# Patient Record
Sex: FEMALE
[De-identification: names, ages, dates, MRNs, and addresses within clinical notes are randomized per-mention and may not be internally consistent; named-entity substitution may affect disease eponyms.]

---

## 2021-03-02 PROBLEM — Z00.00 ENCOUNTER FOR PREVENTIVE HEALTH EXAMINATION: Status: ACTIVE | Noted: 2021-03-02

## 2021-03-08 ENCOUNTER — APPOINTMENT (OUTPATIENT)
Dept: SURGERY | Facility: CLINIC | Age: 76
End: 2021-03-08
Payer: MEDICARE

## 2021-03-08 VITALS
HEIGHT: 62 IN | DIASTOLIC BLOOD PRESSURE: 72 MMHG | OXYGEN SATURATION: 99 % | TEMPERATURE: 98.3 F | SYSTOLIC BLOOD PRESSURE: 123 MMHG | WEIGHT: 130 LBS | HEART RATE: 67 BPM | BODY MASS INDEX: 23.92 KG/M2

## 2021-03-08 DIAGNOSIS — Z81.8 FAMILY HISTORY OF OTHER MENTAL AND BEHAVIORAL DISORDERS: ICD-10-CM

## 2021-03-08 DIAGNOSIS — Z80.0 FAMILY HISTORY OF MALIGNANT NEOPLASM OF DIGESTIVE ORGANS: ICD-10-CM

## 2021-03-08 DIAGNOSIS — E78.00 PURE HYPERCHOLESTEROLEMIA, UNSPECIFIED: ICD-10-CM

## 2021-03-08 DIAGNOSIS — Z83.3 FAMILY HISTORY OF DIABETES MELLITUS: ICD-10-CM

## 2021-03-08 DIAGNOSIS — K80.20 CALCULUS OF GALLBLADDER W/OUT CHOLECYSTITIS W/OUT OBSTRUCTION: ICD-10-CM

## 2021-03-08 DIAGNOSIS — Z78.9 OTHER SPECIFIED HEALTH STATUS: ICD-10-CM

## 2021-03-08 DIAGNOSIS — E11.9 TYPE 2 DIABETES MELLITUS W/OUT COMPLICATIONS: ICD-10-CM

## 2021-03-08 DIAGNOSIS — R09.89 OTHER SPECIFIED SYMPTOMS AND SIGNS INVOLVING THE CIRCULATORY AND RESPIRATORY SYSTEMS: ICD-10-CM

## 2021-03-08 DIAGNOSIS — Z80.9 FAMILY HISTORY OF MALIGNANT NEOPLASM, UNSPECIFIED: ICD-10-CM

## 2021-03-08 PROCEDURE — 99203 OFFICE O/P NEW LOW 30 MIN: CPT

## 2021-03-08 PROCEDURE — 99072 ADDL SUPL MATRL&STAF TM PHE: CPT

## 2021-03-12 ENCOUNTER — OUTPATIENT (OUTPATIENT)
Dept: OUTPATIENT SERVICES | Facility: HOSPITAL | Age: 76
LOS: 1 days | End: 2021-03-12
Payer: COMMERCIAL

## 2021-03-12 VITALS
TEMPERATURE: 98 F | SYSTOLIC BLOOD PRESSURE: 137 MMHG | OXYGEN SATURATION: 99 % | HEART RATE: 69 BPM | DIASTOLIC BLOOD PRESSURE: 74 MMHG | RESPIRATION RATE: 18 BRPM | WEIGHT: 130.07 LBS | HEIGHT: 62 IN

## 2021-03-12 DIAGNOSIS — K80.20 CALCULUS OF GALLBLADDER WITHOUT CHOLECYSTITIS WITHOUT OBSTRUCTION: ICD-10-CM

## 2021-03-12 DIAGNOSIS — E11.40 TYPE 2 DIABETES MELLITUS WITH DIABETIC NEUROPATHY, UNSPECIFIED: ICD-10-CM

## 2021-03-12 DIAGNOSIS — K81.9 CHOLECYSTITIS, UNSPECIFIED: ICD-10-CM

## 2021-03-12 DIAGNOSIS — I10 ESSENTIAL (PRIMARY) HYPERTENSION: ICD-10-CM

## 2021-03-12 DIAGNOSIS — E78.5 HYPERLIPIDEMIA, UNSPECIFIED: ICD-10-CM

## 2021-03-12 DIAGNOSIS — Z98.891 HISTORY OF UTERINE SCAR FROM PREVIOUS SURGERY: Chronic | ICD-10-CM

## 2021-03-12 DIAGNOSIS — M81.0 AGE-RELATED OSTEOPOROSIS WITHOUT CURRENT PATHOLOGICAL FRACTURE: ICD-10-CM

## 2021-03-12 DIAGNOSIS — E11.9 TYPE 2 DIABETES MELLITUS WITHOUT COMPLICATIONS: ICD-10-CM

## 2021-03-12 DIAGNOSIS — Z98.890 OTHER SPECIFIED POSTPROCEDURAL STATES: Chronic | ICD-10-CM

## 2021-03-12 DIAGNOSIS — J30.2 OTHER SEASONAL ALLERGIC RHINITIS: ICD-10-CM

## 2021-03-12 DIAGNOSIS — Z01.818 ENCOUNTER FOR OTHER PREPROCEDURAL EXAMINATION: ICD-10-CM

## 2021-03-12 LAB — BLD GP AB SCN SERPL QL: SIGNIFICANT CHANGE UP

## 2021-03-12 PROCEDURE — G0463: CPT

## 2021-03-12 NOTE — H&P PST ADULT - HISTORY OF PRESENT ILLNESS
76 yr old female with PMH of  presents with c/o intermittent abdominal pain due to gallstones. Pt reports feeling nauseous before meals and worsening of pain after ingestion of fatty meals. Pt for laparoscopic cholecystectomy with intraoperative cholangiogram, possible open on 03/24/2021. 76 yr old female with PMH of Diabetes, diabetic neuropathy, seasonal allergies, HTN, HLD, osteoporosis presents with c/o gallstones that were incidentally discovered.. Pt reports that she fell and she had a spleen laceration. Follow-up CT of the abdomen revealed multiple gallstones. Denies any discomfort, nausea, vomiting. Pt is scheduled for laparoscopic cholecystectomy with intraoperative cholangiogram, possible open on 03/24/2021.

## 2021-03-12 NOTE — H&P PST ADULT - NEGATIVE GASTROINTESTINAL SYMPTOMS
no nausea/no vomiting/no diarrhea/no constipation/no change in bowel habits/no flatulence/no abdominal pain/no melena/no jaundice/no hiccoughs

## 2021-03-12 NOTE — H&P PST ADULT - BP NONINVASIVE SYSTOLIC (MM HG)
137

## 2021-03-12 NOTE — H&P PST ADULT - ASSESSMENT
76 yr old female with PMH of Diabetes, diabetic neuropathy, seasonal allergies, HTN, Hyperlipidemia, osteoporosis presents with cholelithiasis. Pt is scheduled for laparoscopic cholecystectomy with intraoperative cholangiogram, possible open on 03/24/2021.

## 2021-03-12 NOTE — H&P PST ADULT - NSICDXFAMILYHX_GEN_ALL_CORE_FT
FAMILY HISTORY:  Family history of depression, mother  Family history of diabetes mellitus in mother  Family history of oral cancer, sister  Family history of stomach cancer, sister  FH: dementia, mother  FH: HTN (hypertension), mother

## 2021-03-12 NOTE — H&P PST ADULT - I HAVE PERSONALLY SEEN AND EXAMINED THE PATIENT. THERE HAVE NOT BEEN ANY CHANGES IN THE PATIENT'S HISTORY OR EXAM UNLESS COMMENTED BELOW
Anemia    Ectopic pregnancy  2, both ruptured  Fibromyalgia    GI bleed    Herniated disc, cervical  c6-c7  Joint effusion    Lumbar disc herniation  L4-L5, BULGING DISC AT L5-S1  Lupus    Ovarian cyst rupture    Polyarthralgia
Statement Selected

## 2021-03-12 NOTE — H&P PST ADULT - NSICDXPROBLEM_GEN_ALL_CORE_FT
PROBLEM DIAGNOSES  Problem: CL (cholelithiasis)  Assessment and Plan: Laparoscopic cholecystectomy with intraoperative cholangiogram, possible open on 03/24/2021. Preoperative instructions discussed with pt via Zimbabwean speaking pacific  Regina ID# [id number] per the patient's request even though she was able to maintain a perfect conversation in English before and after the  was called. Pt answered questions at times before  translated for her. Written [place]an Instructions given to pt. Instructed pt that she will need someone to escort her home after surgery, that no one will be allowed to come to hospital with her, to notify security when she arrives in the lobby of the hospital that she is here for surgery, not to eat or drink anything after midnight the night before the surgery, to avoid NSAIDS such as Ibuprofen, motrin, aleve, advil, naproxen before surgery, to take Tylenol if needed for pain, to report if she has been exposed to any one with any contagious diseases including Covid-19 or if she is exhibiting any symptoms of COVID-19, to keep appointment for COVID-19  test 3 days before surgery. Instructed about use of Chlorhexidine 4% soap before surgery. Verbalized understanding of instructions given.     Problem: DM (diabetes mellitus)  Assessment and Plan: Instructed to continue antidiabetic meds and to hold on day of surgery. Perioperative glucose monitoring and coverage as needed. Follow-up with PCP for diabetic management     Problem: Diabetic neuropathy  Assessment and Plan: Follow-up with PCP for management.     Problem: HTN (hypertension)  Assessment and Plan: Instructed to continue antihypertensive meds and take with sips of water on day of surgery.  Follow-up with PCP for management.     Problem: HLD (hyperlipidemia)  Assessment and Plan: Instructed pt to continue Atorvastatin and to follow-up with PCP for lipid management     Problem: Seasonal allergies  Assessment and Plan: Instructed to continue medications as needed and to follow-up with PCP for allergy management.     Problem: Osteoporosis  Assessment and Plan: Instructed to continue Ibandronate and to follow-up with provider for management.

## 2021-03-12 NOTE — H&P PST ADULT - NSICDXPASTMEDICALHX_GEN_ALL_CORE_FT
PAST MEDICAL HISTORY:  CL (cholelithiasis)     DM (diabetes mellitus)     HLD (hyperlipidemia)     HTN (hypertension)     Osteoporosis      PAST MEDICAL HISTORY:  CL (cholelithiasis)     Diabetic neuropathy     DM (diabetes mellitus)     HLD (hyperlipidemia)     HTN (hypertension)     Osteoporosis

## 2021-03-12 NOTE — H&P PST ADULT - MUSCULOSKELETAL COMMENTS
c/o pain in feet; h/o frequent [id number] negative as per patient Osteoporosis, c/o pain in feet; h/o frequent [id number] negative as per patient

## 2021-03-12 NOTE — H&P PST ADULT - NEGATIVE NEUROLOGICAL SYMPTOMS
no transient paralysis/no weakness/no paresthesias/no generalized seizures/no focal seizures/no syncope/no tremors/no vertigo

## 2021-03-15 NOTE — PHYSICAL EXAM
[No Rash or Lesion] : No rash or lesion [Alert] : alert [Oriented to Person] : oriented to person [Oriented to Place] : oriented to place [Oriented to Time] : oriented to time [Calm] : calm [de-identified] : The patient is alert, well-groomed, well developed and cheerful.  [de-identified] : Head is normocephalic. Conjunctiva pink, anicteric. Nasal mucosa pink, septum midline. Oral mucosa pink. Tongue midline, pharynx without exudates. \par \par   [de-identified] : Neck supple. Trachea midline. Thyroid isthmus barely palpable, lobes not felt.\par   [de-identified] : Breath sounds equal and bilateral, no wheezing no rales or rhonchi  [de-identified] :  good S1, S2, no m/r/g bilateral  [de-identified] : Normoactive bowel sounds, soft and nontender, no hepatosplenomegaly or masses noted, Patient has healed scars from previous surgeries [de-identified] : WNL

## 2021-03-15 NOTE — PLAN
[FreeTextEntry1] : Ms. KNIGHT  was told significance of findings, options, risks and benefits were explained.  Informed consent for laparoscopic/possible open  cholecystectomy  and potential risks, benefits and alternatives (surgical options were discussed including non-surgical options or the option of no surgery) to the planned surgery were discussed in depth.  All surgical options were discussed including non-surgical treatments.  She wishes to proceed with surgery.  We will plan for surgery on at the next available date, pending any required insurance pre-certification or pre-approval. She agrees to obtain any necessary pre-operative evaluations and testing prior to surgery. Patient instructed to maintain a fat-free diet, and to seek immediate medical attention with any acute change or worsening of symptoms, including but not limited to abdominal pain, fever, chills, nausea, vomiting, or yellowing of the skin. \par Patient advised to seek immediate medical attention with any acute change in symptoms or with the development of any new or worsening symptoms.  Patient's questions and concerns addressed to patient's satisfaction, and patient verbalized an understanding of the information discussed.

## 2021-03-15 NOTE — CONSULT LETTER
[Dear  ___] : Dear  [unfilled], [Consult Letter:] : I had the pleasure of evaluating your patient, [unfilled]. [Please see my note below.] : Please see my note below. [Consult Closing:] : Thank you very much for allowing me to participate in the care of this patient.  If you have any questions, please do not hesitate to contact me. [Sincerely,] : Sincerely, [FreeTextEntry3] : Eddie Hummel MD, FACS

## 2021-03-15 NOTE — HISTORY OF PRESENT ILLNESS
[de-identified] : STANISLAV KNIGHT is a 75 year old female who present in the office for initial consultation who was referred by Dr. Regan Donaldson with the chief complaint of having abdominal pain. Patient was admitted to [facility] with diabetic coma and was discharged to rehab. Patient repots having a fall in [place] and had a spleen laceration. Patient underwent a CT scan on 12/09/2020 which revealed multiple gallstones. Patient reports no nausea or vomiting and no history of jaundice, acholia or choluria. Appetite is good and weight is stable. Patient has family history of gallstone of a daughter that was successfully treated. Patient denies any other discomfort. \par \par  [de-identified] : \par

## 2021-03-21 ENCOUNTER — APPOINTMENT (OUTPATIENT)
Dept: DISASTER EMERGENCY | Facility: CLINIC | Age: 76
End: 2021-03-21

## 2021-03-22 LAB — SARS-COV-2 N GENE NPH QL NAA+PROBE: NOT DETECTED

## 2021-03-24 ENCOUNTER — APPOINTMENT (OUTPATIENT)
Dept: SURGERY | Facility: HOSPITAL | Age: 76
End: 2021-03-24

## 2021-04-03 DIAGNOSIS — Z01.818 ENCOUNTER FOR OTHER PREPROCEDURAL EXAMINATION: ICD-10-CM

## 2021-04-04 ENCOUNTER — APPOINTMENT (OUTPATIENT)
Dept: DISASTER EMERGENCY | Facility: CLINIC | Age: 76
End: 2021-04-04

## 2021-04-05 LAB — SARS-COV-2 N GENE NPH QL NAA+PROBE: NOT DETECTED

## 2021-04-06 ENCOUNTER — TRANSCRIPTION ENCOUNTER (OUTPATIENT)
Age: 76
End: 2021-04-06

## 2021-04-07 ENCOUNTER — APPOINTMENT (OUTPATIENT)
Dept: SURGERY | Facility: HOSPITAL | Age: 76
End: 2021-04-07

## 2021-04-07 ENCOUNTER — OUTPATIENT (OUTPATIENT)
Dept: OUTPATIENT SERVICES | Facility: HOSPITAL | Age: 76
LOS: 1 days | End: 2021-04-07
Payer: COMMERCIAL

## 2021-04-07 ENCOUNTER — RESULT REVIEW (OUTPATIENT)
Age: 76
End: 2021-04-07

## 2021-04-07 VITALS — OXYGEN SATURATION: 98 % | HEART RATE: 63 BPM | RESPIRATION RATE: 14 BRPM

## 2021-04-07 VITALS
SYSTOLIC BLOOD PRESSURE: 181 MMHG | HEIGHT: 62 IN | TEMPERATURE: 98 F | HEART RATE: 71 BPM | WEIGHT: 132.06 LBS | RESPIRATION RATE: 17 BRPM | OXYGEN SATURATION: 98 % | DIASTOLIC BLOOD PRESSURE: 83 MMHG

## 2021-04-07 DIAGNOSIS — Z98.891 HISTORY OF UTERINE SCAR FROM PREVIOUS SURGERY: Chronic | ICD-10-CM

## 2021-04-07 DIAGNOSIS — Z98.890 OTHER SPECIFIED POSTPROCEDURAL STATES: Chronic | ICD-10-CM

## 2021-04-07 DIAGNOSIS — K80.20 CALCULUS OF GALLBLADDER WITHOUT CHOLECYSTITIS WITHOUT OBSTRUCTION: ICD-10-CM

## 2021-04-07 LAB
BLD GP AB SCN SERPL QL: SIGNIFICANT CHANGE UP
GLUCOSE BLDC GLUCOMTR-MCNC: 129 MG/DL — HIGH (ref 70–99)
GLUCOSE BLDC GLUCOMTR-MCNC: 204 MG/DL — HIGH (ref 70–99)

## 2021-04-07 PROCEDURE — 88304 TISSUE EXAM BY PATHOLOGIST: CPT

## 2021-04-07 PROCEDURE — 47563 LAPARO CHOLECYSTECTOMY/GRAPH: CPT

## 2021-04-07 PROCEDURE — 86900 BLOOD TYPING SEROLOGIC ABO: CPT

## 2021-04-07 PROCEDURE — C9399: CPT

## 2021-04-07 PROCEDURE — 82962 GLUCOSE BLOOD TEST: CPT

## 2021-04-07 PROCEDURE — 36415 COLL VENOUS BLD VENIPUNCTURE: CPT

## 2021-04-07 PROCEDURE — 88304 TISSUE EXAM BY PATHOLOGIST: CPT | Mod: 26

## 2021-04-07 PROCEDURE — 86901 BLOOD TYPING SEROLOGIC RH(D): CPT

## 2021-04-07 PROCEDURE — 47562 LAPAROSCOPIC CHOLECYSTECTOMY: CPT

## 2021-04-07 PROCEDURE — 47563 LAPARO CHOLECYSTECTOMY/GRAPH: CPT | Mod: AS

## 2021-04-07 PROCEDURE — 76000 FLUOROSCOPY <1 HR PHYS/QHP: CPT

## 2021-04-07 PROCEDURE — 86850 RBC ANTIBODY SCREEN: CPT

## 2021-04-07 RX ORDER — HYDROMORPHONE HYDROCHLORIDE 2 MG/ML
1 INJECTION INTRAMUSCULAR; INTRAVENOUS; SUBCUTANEOUS
Refills: 0 | Status: DISCONTINUED | OUTPATIENT
Start: 2021-04-07 | End: 2021-04-07

## 2021-04-07 RX ORDER — HYDROMORPHONE HYDROCHLORIDE 2 MG/ML
0.5 INJECTION INTRAMUSCULAR; INTRAVENOUS; SUBCUTANEOUS
Refills: 0 | Status: DISCONTINUED | OUTPATIENT
Start: 2021-04-07 | End: 2021-04-07

## 2021-04-07 RX ORDER — SODIUM CHLORIDE 9 MG/ML
3 INJECTION INTRAMUSCULAR; INTRAVENOUS; SUBCUTANEOUS EVERY 8 HOURS
Refills: 0 | Status: DISCONTINUED | OUTPATIENT
Start: 2021-04-07 | End: 2021-04-07

## 2021-04-07 RX ORDER — OXYCODONE HYDROCHLORIDE 5 MG/1
1 TABLET ORAL
Qty: 8 | Refills: 0
Start: 2021-04-07

## 2021-04-07 NOTE — ASU DISCHARGE PLAN (ADULT/PEDIATRIC) - CARE PROVIDER_API CALL
Eddie Hummel)  Surgery  [address] [facility], [place] [address]  Phone: [phone]  Fax: [phone]  Follow Up Time: 2 weeks

## 2021-04-07 NOTE — ASU DISCHARGE PLAN (ADULT/PEDIATRIC) - ASU DC SPECIAL INSTRUCTIONSFT
Please follow-up with your surgeon in 2 weeks. Drink plenty of fluids and rest as needed. Call for any fever over 101, nausea, vomiting, severe pain, no passing of gas or bowel movement.     DIET   You may resume your regular diet as normal.     SURGICAL SITES   Remove outer dressing and keep white steri-strips in place allowing them to fall off on their own. You may shower 48 hours post-operatively but do not bathe or soak in the water for 1-2 weeks; pat dry. If you notice any signs of surgical site infection (ie. redness, swelling, pain, pus drainage), please seek medical care immediately.     ACTIVITY Do not lift anything heavier than 10 pounds for 2 weeks and avoid strenuous activity for 4-6 weeks.     PAIN CONTROL   You may take Motrin 600mg (with food) or Tylenol 650mg as needed for mild pain. Stagger one medication 3 hours after the other for maximum pain control. Maximum daily dose of Tylenol should not exceed 4grams/day.  You may take your prescribed oxycodone for severe breakthrough pain not that is not relieved by Tylenol/Motrin. Do not drive or make important decisions while taking this medication and do not take more than 4 pills in 24 hours.

## 2021-04-09 LAB — SURGICAL PATHOLOGY STUDY: SIGNIFICANT CHANGE UP

## 2023-03-21 NOTE — ASU PREOP CHECKLIST - SIDE RAILS UP
Chief complaint:   Chief Complaint   Patient presents with   • Follow-up     Pt here for 1 week f/u on cyst       Vitals:  Visit Vitals  /58 (BP Location: RUE - Right upper extremity, Patient Position: Sitting, Cuff Size: Regular)   Pulse 76   Ht 5' 11\" (1.803 m)   Wt 105.7 kg (233 lb)   SpO2 99%   BMI 32.50 kg/m²       HISTORY OF PRESENT ILLNESS     Patient is a 60-year-old female who presents for follow-up. She has sebaceous cyst on the left upper back that was drained on last office visit.  She was also prescribed clindamycin for 7 days for the 2nd round treatment.  She stated that the cyst is healing but continues to have mild pain.  She denies fever, chills, pus drainage.         Past medical history, medications, allergies and social history: Reviewed and updated in Epic.       Other significant problems:  Patient Active Problem List    Diagnosis Date Noted   • Chondromalacia, knee, left 06/14/2021     Priority: Low   • Chronic fatigue 06/01/2021     Priority: Low   • Mild obstructive sleep apnea-hypopnea syndrome 06/01/2021     Priority: Low   • BMI 33.0-33.9,adult 03/10/2021     Priority: Low   • Controlled substance agreement signed 04/02/2018     Priority: Low     Formatting of this note might be different from the original.  Dr. Thai Sanchez     • DJD (degenerative joint disease), lumbar 01/04/2017     Priority: Low   • Myofascial pain syndrome 01/04/2017     Priority: Low   • High risk medication use 10/04/2016     Priority: Low   • Inflammatory polyarthritis (CMD) 12/28/2015     Priority: Low   • Gastritis 07/02/2015     Priority: Low   • Insomnia 06/06/2012     Priority: Low   • Depression 06/05/2012     Priority: Low   • Memory loss or impairment 06/05/2012     Priority: Low   • Raynaud's phenomenon 06/05/2012     Priority: Low       PAST MEDICAL, FAMILY AND SOCIAL HISTORY     Medications:  Current Outpatient Medications   Medication Sig Dispense Refill   • gabapentin (NEURONTIN) 600 MG  tablet Take 1 tablet by mouth in the morning and 1 tablet at noon and 1 tablet in the evening. 90 tablet 2   • hydroxychloroquine (PLAQUENIL) 200 MG tablet Take 1 tablet by mouth twice daily 180 tablet 1   • meloxicam (MOBIC) 7.5 MG tablet Take 1 tablet by mouth twice daily as needed for pain 180 tablet 1   • pantoprazole (PROTONIX) 40 MG tablet Take 1 tablet by mouth once daily 90 tablet 2   • fluoxetine (PROzac) 40 MG capsule Take 1 capsule by mouth once daily 90 capsule 2   • tiZANidine (ZANAFLEX) 4 MG tablet Take 1 tablet by mouth 2 times daily as needed (muscle spasms). 30 tablet 0   • VITAMIN D, CHOLECALCIFEROL, PO        No current facility-administered medications for this visit.       Allergies:  ALLERGIES:   Allergen Reactions   • Cephalexin VOMITING   • Adhesive   (Environmental) RASH   • Codeine Other (See Comments)     Bad thoughts mentally    • Duloxetine Nausea & Vomiting   • Fexofenadine HEADACHES   • Levofloxacin SWELLING   • Montelukast HEADACHES   • Pregabalin NAUSEA   • Rofecoxib NAUSEA   • Sulfa Antibiotics Other (See Comments)     Both parents are allergic       Past Medical  History/Surgeries:  Past Medical History:   Diagnosis Date   • Elevated blood pressure reading 6/1/2021   • Fracture of right proximal fibula 9/25/2015   • Gastroesophageal reflux disease    • Pain in joint 6/5/2012   • Pain in knee 6/6/2012   • PONV (postoperative nausea and vomiting)    • Right leg injury, subsequent encounter 8/14/2015   • Sleep apnea        Past Surgical History:   Procedure Laterality Date   • Carpal tunnel release Bilateral    • Hysterectomy     • Lipoma resection      back   • Tonsillectomy         Family History:  Family History   Problem Relation Age of Onset   • Congestive Heart Failure Mother    • Congestive Heart Failure Father    • Stroke Father        Social History:  Social History     Tobacco Use   • Smoking status: Former   • Smokeless tobacco: Never   Substance Use Topics   • Alcohol use:  Yes     Alcohol/week: 1.0 standard drink     Types: 1 Glasses of wine per week     Comment: 2 x yr       REVIEW OF SYSTEMS     Constitutional: Negative for fever and chills or diaphoresis.   Skin: cyst improving.   Respiratory: Negative cough, wheezing or shortness of breath.    Cardiovascular: Negative for chest pain or palpitations.    Gastrointestinal: Negative for nausea, vomiting, diarrhea or abdominal pain.   Neurologic:  Negative for headaches, weakness or numbness   Psychiatric: Negative for change in affect or change in mentation.     PHYSICAL EXAM     Vital Signs:   Visit Vitals  /58 (BP Location: RUE - Right upper extremity, Patient Position: Sitting, Cuff Size: Regular)   Pulse 76   Ht 5' 11\" (1.803 m)   Wt 105.7 kg (233 lb)   SpO2 99%   BMI 32.50 kg/m²     Constitutional: Well developed/Well nourished. In NAD (no acute distress). Appears stated age.   Cardiovascular: Normal heart rate. Normal rhythm. No murmurs.  Respiratory: Normal breath sounds. No respiratory distress. No wheezing.   Neurologic: Alert and oriented x3. Normal motor function. Normal sensory function. No focal deficits noted.    Skin: healing cyst on left upper back s /p I&D.   Musculoskeletal: Range of motion within normal limits. No tenderness, swelling/edema.  Psychiatric: Mood and affect are appropriate.      ASSESSMENT/PLAN     Sebaceous cyst    The cyst has gone down in swelling and there is no active pus drainage. Patient to monitored closely and will call next week for updates and should follow up in clinic or UC/ED for any worsening symptoms.          The patient indicated understanding of the diagnosis and agreed with the plan of care.   Treatment options discussed with patient and explained in detail. The risks, benefits and potential side effects of possible medications were reviewed. Alternatives were discussed. Medication instructions and consequences of not taking the medications were discussed. Patient's  understanding was assessed and patient agreed with the plan. Monitoring parameters and expected course outlined. Patient to call or come in if symptoms fail to respond as outlined or worsen in any way.    Janeth Jarrell MD  Family Medicine   03/21/23                         done

## 2024-01-30 ENCOUNTER — INPATIENT (INPATIENT)
Facility: HOSPITAL | Age: 79
LOS: 14 days | Discharge: HOME HEALTH | End: 2024-02-14
Attending: INTERNAL MEDICINE | Admitting: INTERNAL MEDICINE
Payer: MEDICARE

## 2024-01-30 VITALS
TEMPERATURE: 98 F | OXYGEN SATURATION: 100 % | HEART RATE: 102 BPM | RESPIRATION RATE: 16 BRPM | DIASTOLIC BLOOD PRESSURE: 80 MMHG | SYSTOLIC BLOOD PRESSURE: 128 MMHG

## 2024-01-30 DIAGNOSIS — R79.89 OTHER SPECIFIED ABNORMAL FINDINGS OF BLOOD CHEMISTRY: ICD-10-CM

## 2024-01-30 DIAGNOSIS — N39.0 URINARY TRACT INFECTION, SITE NOT SPECIFIED: ICD-10-CM
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
## 2024-01-30 DIAGNOSIS — M31.6 OTHER GIANT CELL ARTERITIS: ICD-10-CM

## 2024-01-30 DIAGNOSIS — Z98.891 HISTORY OF UTERINE SCAR FROM PREVIOUS SURGERY: Chronic | ICD-10-CM

## 2024-01-30 DIAGNOSIS — I10 ESSENTIAL (PRIMARY) HYPERTENSION: ICD-10-CM

## 2024-01-30 DIAGNOSIS — E78.5 HYPERLIPIDEMIA, UNSPECIFIED: ICD-10-CM

## 2024-01-30 DIAGNOSIS — Z29.9 ENCOUNTER FOR PROPHYLACTIC MEASURES, UNSPECIFIED: ICD-10-CM

## 2024-01-30 DIAGNOSIS — N30.00 ACUTE CYSTITIS WITHOUT HEMATURIA: ICD-10-CM

## 2024-01-30 DIAGNOSIS — Z86.69 PERSONAL HISTORY OF OTHER DISEASES OF THE NERVOUS SYSTEM AND SENSE ORGANS: ICD-10-CM

## 2024-01-30 DIAGNOSIS — E87.0 HYPEROSMOLALITY AND HYPERNATREMIA: ICD-10-CM

## 2024-01-30 DIAGNOSIS — D64.9 ANEMIA, UNSPECIFIED: ICD-10-CM

## 2024-01-30 DIAGNOSIS — Z98.890 OTHER SPECIFIED POSTPROCEDURAL STATES: Chronic | ICD-10-CM

## 2024-01-30 DIAGNOSIS — E87.6 HYPOKALEMIA: ICD-10-CM

## 2024-01-30 DIAGNOSIS — R41.82 ALTERED MENTAL STATUS, UNSPECIFIED: ICD-10-CM

## 2024-01-30 DIAGNOSIS — E11.9 TYPE 2 DIABETES MELLITUS WITHOUT COMPLICATIONS: ICD-10-CM

## 2024-01-30 DIAGNOSIS — N82.0 VESICOVAGINAL FISTULA: ICD-10-CM

## 2024-01-30 PROBLEM — E11.40 TYPE 2 DIABETES MELLITUS WITH DIABETIC NEUROPATHY, UNSPECIFIED: Chronic | Status: ACTIVE | Noted: 2021-03-12

## 2024-01-30 PROBLEM — M81.0 AGE-RELATED OSTEOPOROSIS WITHOUT CURRENT PATHOLOGICAL FRACTURE: Chronic | Status: ACTIVE | Noted: 2021-03-12

## 2024-01-30 PROBLEM — K80.20 CALCULUS OF GALLBLADDER WITHOUT CHOLECYSTITIS WITHOUT OBSTRUCTION: Chronic | Status: ACTIVE | Noted: 2021-03-12

## 2024-01-30 LAB
ALBUMIN SERPL ELPH-MCNC: 3.6 G/DL — SIGNIFICANT CHANGE UP (ref 3.3–5)
ALP SERPL-CCNC: 55 U/L — SIGNIFICANT CHANGE UP (ref 40–120)
ALT FLD-CCNC: <5 U/L — SIGNIFICANT CHANGE UP (ref 4–33)
ANION GAP SERPL CALC-SCNC: 14 MMOL/L — SIGNIFICANT CHANGE UP (ref 7–14)
ANION GAP SERPL CALC-SCNC: 21 MMOL/L — HIGH (ref 7–14)
ANISOCYTOSIS BLD QL: SLIGHT — SIGNIFICANT CHANGE UP
APPEARANCE UR: ABNORMAL
APTT BLD: 24.8 SEC — SIGNIFICANT CHANGE UP (ref 24.5–35.6)
AST SERPL-CCNC: 20 U/L — SIGNIFICANT CHANGE UP (ref 4–32)
BACTERIA # UR AUTO: ABNORMAL /HPF
BASE EXCESS BLDV CALC-SCNC: -3.9 MMOL/L — LOW (ref -2–3)
BASOPHILS # BLD AUTO: 0 K/UL — SIGNIFICANT CHANGE UP (ref 0–0.2)
BASOPHILS NFR BLD AUTO: 0 % — SIGNIFICANT CHANGE UP (ref 0–2)
BILIRUB SERPL-MCNC: 0.5 MG/DL — SIGNIFICANT CHANGE UP (ref 0.2–1.2)
BILIRUB UR-MCNC: ABNORMAL
BLOOD GAS VENOUS COMPREHENSIVE RESULT: SIGNIFICANT CHANGE UP
BUN SERPL-MCNC: 25 MG/DL — HIGH (ref 7–23)
BUN SERPL-MCNC: 29 MG/DL — HIGH (ref 7–23)
BURR CELLS BLD QL SMEAR: PRESENT — SIGNIFICANT CHANGE UP
CALCIUM SERPL-MCNC: 8.7 MG/DL — SIGNIFICANT CHANGE UP (ref 8.4–10.5)
CALCIUM SERPL-MCNC: 9.2 MG/DL — SIGNIFICANT CHANGE UP (ref 8.4–10.5)
CAST: 0 /LPF — SIGNIFICANT CHANGE UP (ref 0–4)
CHLORIDE BLDV-SCNC: 109 MMOL/L — HIGH (ref 96–108)
CHLORIDE SERPL-SCNC: 110 MMOL/L — HIGH (ref 98–107)
CHLORIDE SERPL-SCNC: 113 MMOL/L — HIGH (ref 98–107)
CO2 BLDV-SCNC: 21.9 MMOL/L — LOW (ref 22–26)
CO2 SERPL-SCNC: 18 MMOL/L — LOW (ref 22–31)
CO2 SERPL-SCNC: 23 MMOL/L — SIGNIFICANT CHANGE UP (ref 22–31)
COLOR SPEC: ABNORMAL
CREAT SERPL-MCNC: 0.41 MG/DL — LOW (ref 0.5–1.3)
CREAT SERPL-MCNC: 0.44 MG/DL — LOW (ref 0.5–1.3)
DIFF PNL FLD: ABNORMAL
EGFR: 98 ML/MIN/1.73M2 — SIGNIFICANT CHANGE UP
EGFR: 99 ML/MIN/1.73M2 — SIGNIFICANT CHANGE UP
EOSINOPHIL # BLD AUTO: 0 K/UL — SIGNIFICANT CHANGE UP (ref 0–0.5)
EOSINOPHIL NFR BLD AUTO: 0 % — SIGNIFICANT CHANGE UP (ref 0–6)
GAS PNL BLDV: 146 MMOL/L — HIGH (ref 136–145)
GIANT PLATELETS BLD QL SMEAR: PRESENT — SIGNIFICANT CHANGE UP
GLUCOSE BLDV-MCNC: 247 MG/DL — HIGH (ref 70–99)
GLUCOSE SERPL-MCNC: 190 MG/DL — HIGH (ref 70–99)
GLUCOSE SERPL-MCNC: 249 MG/DL — HIGH (ref 70–99)
GLUCOSE UR QL: 250 MG/DL
HCO3 BLDV-SCNC: 21 MMOL/L — LOW (ref 22–29)
HCT VFR BLD CALC: 35.1 % — SIGNIFICANT CHANGE UP (ref 34.5–45)
HCT VFR BLDA CALC: 37 % — SIGNIFICANT CHANGE UP (ref 34.5–46.5)
HGB BLD CALC-MCNC: 12.3 G/DL — SIGNIFICANT CHANGE UP (ref 11.7–16.1)
HGB BLD-MCNC: 11.7 G/DL — SIGNIFICANT CHANGE UP (ref 11.5–15.5)
IANC: 12.65 K/UL — HIGH (ref 1.8–7.4)
INR BLD: 0.92 RATIO — SIGNIFICANT CHANGE UP (ref 0.85–1.18)
KETONES UR-MCNC: 40 MG/DL
LACTATE BLDV-MCNC: 1.8 MMOL/L — SIGNIFICANT CHANGE UP (ref 0.5–2)
LEUKOCYTE ESTERASE UR-ACNC: ABNORMAL
LYMPHOCYTES # BLD AUTO: 1.36 K/UL — SIGNIFICANT CHANGE UP (ref 1–3.3)
LYMPHOCYTES # BLD AUTO: 8.8 % — LOW (ref 13–44)
MANUAL SMEAR VERIFICATION: SIGNIFICANT CHANGE UP
MCHC RBC-ENTMCNC: 27.9 PG — SIGNIFICANT CHANGE UP (ref 27–34)
MCHC RBC-ENTMCNC: 33.3 GM/DL — SIGNIFICANT CHANGE UP (ref 32–36)
MCV RBC AUTO: 83.8 FL — SIGNIFICANT CHANGE UP (ref 80–100)
MONOCYTES # BLD AUTO: 0.42 K/UL — SIGNIFICANT CHANGE UP (ref 0–0.9)
MONOCYTES NFR BLD AUTO: 2.7 % — SIGNIFICANT CHANGE UP (ref 2–14)
NEUTROPHILS # BLD AUTO: 13.13 K/UL — HIGH (ref 1.8–7.4)
NEUTROPHILS NFR BLD AUTO: 85 % — HIGH (ref 43–77)
NITRITE UR-MCNC: POSITIVE
OVALOCYTES BLD QL SMEAR: SLIGHT — SIGNIFICANT CHANGE UP
PCO2 BLDV: 36 MMHG — LOW (ref 39–52)
PH BLDV: 7.37 — SIGNIFICANT CHANGE UP (ref 7.32–7.43)
PH UR: 5 — SIGNIFICANT CHANGE UP (ref 5–8)
PLAT MORPH BLD: ABNORMAL
PLATELET # BLD AUTO: 231 K/UL — SIGNIFICANT CHANGE UP (ref 150–400)
PLATELET COUNT - ESTIMATE: NORMAL — SIGNIFICANT CHANGE UP
PO2 BLDV: 63 MMHG — HIGH (ref 25–45)
POIKILOCYTOSIS BLD QL AUTO: SLIGHT — SIGNIFICANT CHANGE UP
POLYCHROMASIA BLD QL SMEAR: SLIGHT — SIGNIFICANT CHANGE UP
POTASSIUM BLDV-SCNC: 3 MMOL/L — LOW (ref 3.5–5.1)
POTASSIUM SERPL-MCNC: 3 MMOL/L — LOW (ref 3.5–5.3)
POTASSIUM SERPL-MCNC: 3.2 MMOL/L — LOW (ref 3.5–5.3)
POTASSIUM SERPL-SCNC: 3 MMOL/L — LOW (ref 3.5–5.3)
POTASSIUM SERPL-SCNC: 3.2 MMOL/L — LOW (ref 3.5–5.3)
PROT SERPL-MCNC: 6.3 G/DL — SIGNIFICANT CHANGE UP (ref 6–8.3)
PROT UR-MCNC: 300 MG/DL
PROTHROM AB SERPL-ACNC: 10.3 SEC — SIGNIFICANT CHANGE UP (ref 9.5–13)
RBC # BLD: 4.19 M/UL — SIGNIFICANT CHANGE UP (ref 3.8–5.2)
RBC # FLD: 21 % — HIGH (ref 10.3–14.5)
RBC BLD AUTO: ABNORMAL
RBC CASTS # UR COMP ASSIST: 1224 /HPF — HIGH (ref 0–4)
REVIEW: SIGNIFICANT CHANGE UP
SAO2 % BLDV: 92.4 % — HIGH (ref 67–88)
SODIUM SERPL-SCNC: 149 MMOL/L — HIGH (ref 135–145)
SODIUM SERPL-SCNC: 150 MMOL/L — HIGH (ref 135–145)
SP GR SPEC: 1.04 — HIGH (ref 1–1.03)
SQUAMOUS # UR AUTO: 11 /HPF — HIGH (ref 0–5)
TROPONIN T, HIGH SENSITIVITY RESULT: 54 NG/L — CRITICAL HIGH
TROPONIN T, HIGH SENSITIVITY RESULT: 70 NG/L — CRITICAL HIGH
TSH SERPL-MCNC: 0.87 UIU/ML — SIGNIFICANT CHANGE UP (ref 0.27–4.2)
UROBILINOGEN FLD QL: 0.2 MG/DL — SIGNIFICANT CHANGE UP (ref 0.2–1)
VARIANT LYMPHS # BLD: 3.5 % — SIGNIFICANT CHANGE UP (ref 0–6)
WBC # BLD: 15.45 K/UL — HIGH (ref 3.8–10.5)
WBC # FLD AUTO: 15.45 K/UL — HIGH (ref 3.8–10.5)
WBC UR QL: 4041 /HPF — HIGH (ref 0–5)

## 2024-01-30 PROCEDURE — 70498 CT ANGIOGRAPHY NECK: CPT | Mod: 26

## 2024-01-30 PROCEDURE — 70450 CT HEAD/BRAIN W/O DYE: CPT | Mod: 26,MA

## 2024-01-30 PROCEDURE — 99285 EMERGENCY DEPT VISIT HI MDM: CPT | Mod: 25

## 2024-01-30 PROCEDURE — 70496 CT ANGIOGRAPHY HEAD: CPT | Mod: 26

## 2024-01-30 PROCEDURE — 74177 CT ABD & PELVIS W/CONTRAST: CPT | Mod: 26,MA

## 2024-01-30 PROCEDURE — 99223 1ST HOSP IP/OBS HIGH 75: CPT

## 2024-01-30 PROCEDURE — 71045 X-RAY EXAM CHEST 1 VIEW: CPT | Mod: 26

## 2024-01-30 RX ORDER — FLUTICASONE PROPIONATE 50 MCG
1 SPRAY, SUSPENSION NASAL
Qty: 0 | Refills: 0 | DISCHARGE

## 2024-01-30 RX ORDER — FERROUS SULFATE 325(65) MG
5 TABLET ORAL
Refills: 0 | DISCHARGE

## 2024-01-30 RX ORDER — INSULIN LISPRO 100/ML
VIAL (ML) SUBCUTANEOUS
Refills: 0 | Status: DISCONTINUED | OUTPATIENT
Start: 2024-01-30 | End: 2024-02-14

## 2024-01-30 RX ORDER — CARBIDOPA AND LEVODOPA 25; 100 MG/1; MG/1
1.5 TABLET ORAL THREE TIMES A DAY
Refills: 0 | Status: DISCONTINUED | OUTPATIENT
Start: 2024-01-30 | End: 2024-02-14

## 2024-01-30 RX ORDER — INSULIN GLARGINE 100 [IU]/ML
15 INJECTION, SOLUTION SUBCUTANEOUS
Refills: 0 | DISCHARGE

## 2024-01-30 RX ORDER — CARBIDOPA AND LEVODOPA 25; 100 MG/1; MG/1
1 TABLET ORAL AT BEDTIME
Refills: 0 | Status: DISCONTINUED | OUTPATIENT
Start: 2024-01-30 | End: 2024-02-14

## 2024-01-30 RX ORDER — ASCORBIC ACID 60 MG
1 TABLET,CHEWABLE ORAL
Qty: 0 | Refills: 0 | DISCHARGE

## 2024-01-30 RX ORDER — METFORMIN HYDROCHLORIDE 850 MG/1
1 TABLET ORAL
Qty: 0 | Refills: 0 | DISCHARGE

## 2024-01-30 RX ORDER — IBANDRONATE SODIUM 150 MG/1
1 TABLET ORAL
Qty: 0 | Refills: 0 | DISCHARGE

## 2024-01-30 RX ORDER — FENOFIBRATE,MICRONIZED 130 MG
145 CAPSULE ORAL DAILY
Refills: 0 | Status: DISCONTINUED | OUTPATIENT
Start: 2024-01-30 | End: 2024-02-14

## 2024-01-30 RX ORDER — METOPROLOL TARTRATE 50 MG
1 TABLET ORAL
Qty: 0 | Refills: 0 | DISCHARGE

## 2024-01-30 RX ORDER — VITAMIN E 100 UNIT
1 CAPSULE ORAL
Qty: 0 | Refills: 0 | DISCHARGE

## 2024-01-30 RX ORDER — PANTOPRAZOLE SODIUM 20 MG/1
40 TABLET, DELAYED RELEASE ORAL
Refills: 0 | Status: DISCONTINUED | OUTPATIENT
Start: 2024-01-30 | End: 2024-02-14

## 2024-01-30 RX ORDER — DEXTROSE 50 % IN WATER 50 %
15 SYRINGE (ML) INTRAVENOUS ONCE
Refills: 0 | Status: DISCONTINUED | OUTPATIENT
Start: 2024-01-30 | End: 2024-02-14

## 2024-01-30 RX ORDER — ASPIRIN/CALCIUM CARB/MAGNESIUM 324 MG
1 TABLET ORAL
Qty: 0 | Refills: 0 | DISCHARGE

## 2024-01-30 RX ORDER — GLUCAGON INJECTION, SOLUTION 0.5 MG/.1ML
1 INJECTION, SOLUTION SUBCUTANEOUS ONCE
Refills: 0 | Status: DISCONTINUED | OUTPATIENT
Start: 2024-01-30 | End: 2024-02-14

## 2024-01-30 RX ORDER — CHOLECALCIFEROL (VITAMIN D3) 125 MCG
1 CAPSULE ORAL
Qty: 0 | Refills: 0 | DISCHARGE

## 2024-01-30 RX ORDER — SODIUM CHLORIDE 9 MG/ML
1000 INJECTION, SOLUTION INTRAVENOUS
Refills: 0 | Status: DISCONTINUED | OUTPATIENT
Start: 2024-01-30 | End: 2024-01-31

## 2024-01-30 RX ORDER — PIPERACILLIN AND TAZOBACTAM 4; .5 G/20ML; G/20ML
3.38 INJECTION, POWDER, LYOPHILIZED, FOR SOLUTION INTRAVENOUS ONCE
Refills: 0 | Status: COMPLETED | OUTPATIENT
Start: 2024-01-30 | End: 2024-01-30

## 2024-01-30 RX ORDER — FOLIC ACID 0.8 MG
1 TABLET ORAL DAILY
Refills: 0 | Status: DISCONTINUED | OUTPATIENT
Start: 2024-01-30 | End: 2024-02-14

## 2024-01-30 RX ORDER — ATORVASTATIN CALCIUM 80 MG/1
4 TABLET, FILM COATED ORAL
Refills: 0 | DISCHARGE

## 2024-01-30 RX ORDER — METOPROLOL TARTRATE 50 MG
50 TABLET ORAL DAILY
Refills: 0 | Status: DISCONTINUED | OUTPATIENT
Start: 2024-01-30 | End: 2024-02-14

## 2024-01-30 RX ORDER — SENNA PLUS 8.6 MG/1
2 TABLET ORAL
Refills: 0 | DISCHARGE

## 2024-01-30 RX ORDER — ENOXAPARIN SODIUM 100 MG/ML
40 INJECTION SUBCUTANEOUS EVERY 24 HOURS
Refills: 0 | Status: DISCONTINUED | OUTPATIENT
Start: 2024-01-30 | End: 2024-01-30

## 2024-01-30 RX ORDER — DEXTROSE 50 % IN WATER 50 %
25 SYRINGE (ML) INTRAVENOUS ONCE
Refills: 0 | Status: DISCONTINUED | OUTPATIENT
Start: 2024-01-30 | End: 2024-02-14

## 2024-01-30 RX ORDER — ATORVASTATIN CALCIUM 80 MG/1
1 TABLET, FILM COATED ORAL
Refills: 0 | DISCHARGE

## 2024-01-30 RX ORDER — FOLIC ACID 0.8 MG
1 TABLET ORAL
Refills: 0 | DISCHARGE

## 2024-01-30 RX ORDER — ACETAMINOPHEN 500 MG
650 TABLET ORAL EVERY 6 HOURS
Refills: 0 | Status: DISCONTINUED | OUTPATIENT
Start: 2024-01-30 | End: 2024-02-14

## 2024-01-30 RX ORDER — FENOFIBRATE,MICRONIZED 130 MG
1 CAPSULE ORAL
Refills: 0 | DISCHARGE

## 2024-01-30 RX ORDER — CARBIDOPA AND LEVODOPA 25; 100 MG/1; MG/1
1.5 TABLET ORAL
Refills: 0 | DISCHARGE

## 2024-01-30 RX ORDER — POLYETHYLENE GLYCOL 3350 17 G/17G
17 POWDER, FOR SOLUTION ORAL
Refills: 0 | DISCHARGE

## 2024-01-30 RX ORDER — ATORVASTATIN CALCIUM 80 MG/1
40 TABLET, FILM COATED ORAL AT BEDTIME
Refills: 0 | Status: DISCONTINUED | OUTPATIENT
Start: 2024-01-30 | End: 2024-02-01

## 2024-01-30 RX ORDER — CEFTRIAXONE 500 MG/1
1000 INJECTION, POWDER, FOR SOLUTION INTRAMUSCULAR; INTRAVENOUS EVERY 24 HOURS
Refills: 0 | Status: COMPLETED | OUTPATIENT
Start: 2024-01-31 | End: 2024-02-02

## 2024-01-30 RX ORDER — FERROUS SULFATE 325(65) MG
220 TABLET ORAL DAILY
Refills: 0 | Status: DISCONTINUED | OUTPATIENT
Start: 2024-01-30 | End: 2024-02-14

## 2024-01-30 RX ORDER — OMEGA-3 ACID ETHYL ESTERS 1 G
1 CAPSULE ORAL
Qty: 0 | Refills: 0 | DISCHARGE

## 2024-01-30 RX ORDER — POLYETHYLENE GLYCOL 3350 17 G/17G
17 POWDER, FOR SOLUTION ORAL DAILY
Refills: 0 | Status: DISCONTINUED | OUTPATIENT
Start: 2024-01-30 | End: 2024-02-14

## 2024-01-30 RX ORDER — ACETAMINOPHEN 500 MG
1000 TABLET ORAL ONCE
Refills: 0 | Status: COMPLETED | OUTPATIENT
Start: 2024-01-30 | End: 2024-01-30

## 2024-01-30 RX ORDER — METOPROLOL TARTRATE 50 MG
1 TABLET ORAL
Refills: 0 | DISCHARGE

## 2024-01-30 RX ORDER — IBANDRONATE SODIUM 150 MG/1
1 TABLET ORAL
Refills: 0 | DISCHARGE

## 2024-01-30 RX ORDER — MAGNESIUM OXIDE 400 MG ORAL TABLET 241.3 MG
1 TABLET ORAL
Qty: 0 | Refills: 0 | DISCHARGE

## 2024-01-30 RX ORDER — POTASSIUM CHLORIDE 20 MEQ
10 PACKET (EA) ORAL
Refills: 0 | Status: COMPLETED | OUTPATIENT
Start: 2024-01-30 | End: 2024-01-30

## 2024-01-30 RX ORDER — CEFTRIAXONE 500 MG/1
1000 INJECTION, POWDER, FOR SOLUTION INTRAMUSCULAR; INTRAVENOUS ONCE
Refills: 0 | Status: COMPLETED | OUTPATIENT
Start: 2024-01-30 | End: 2024-01-30

## 2024-01-30 RX ORDER — MIRTAZAPINE 45 MG/1
7.5 TABLET, ORALLY DISINTEGRATING ORAL DAILY
Refills: 0 | Status: DISCONTINUED | OUTPATIENT
Start: 2024-01-30 | End: 2024-02-14

## 2024-01-30 RX ORDER — CARBIDOPA AND LEVODOPA 25; 100 MG/1; MG/1
1 TABLET ORAL
Refills: 0 | DISCHARGE

## 2024-01-30 RX ORDER — INSULIN LISPRO 100/ML
7 VIAL (ML) SUBCUTANEOUS
Refills: 0 | DISCHARGE

## 2024-01-30 RX ORDER — INSULIN GLARGINE 100 [IU]/ML
10 INJECTION, SOLUTION SUBCUTANEOUS AT BEDTIME
Refills: 0 | Status: DISCONTINUED | OUTPATIENT
Start: 2024-01-30 | End: 2024-01-31

## 2024-01-30 RX ORDER — SENNA PLUS 8.6 MG/1
2 TABLET ORAL AT BEDTIME
Refills: 0 | Status: DISCONTINUED | OUTPATIENT
Start: 2024-01-30 | End: 2024-02-14

## 2024-01-30 RX ORDER — INSULIN LISPRO 100/ML
6 VIAL (ML) SUBCUTANEOUS ONCE
Refills: 0 | Status: COMPLETED | OUTPATIENT
Start: 2024-01-30 | End: 2024-01-30

## 2024-01-30 RX ORDER — PANTOPRAZOLE SODIUM 20 MG/1
1 TABLET, DELAYED RELEASE ORAL
Refills: 0 | DISCHARGE

## 2024-01-30 RX ORDER — SODIUM CHLORIDE 9 MG/ML
1000 INJECTION INTRAMUSCULAR; INTRAVENOUS; SUBCUTANEOUS ONCE
Refills: 0 | Status: COMPLETED | OUTPATIENT
Start: 2024-01-30 | End: 2024-01-30

## 2024-01-30 RX ORDER — MIRTAZAPINE 45 MG/1
2 TABLET, ORALLY DISINTEGRATING ORAL
Refills: 0 | DISCHARGE

## 2024-01-30 RX ORDER — SODIUM CHLORIDE 9 MG/ML
1000 INJECTION, SOLUTION INTRAVENOUS
Refills: 0 | Status: DISCONTINUED | OUTPATIENT
Start: 2024-01-30 | End: 2024-02-14

## 2024-01-30 RX ADMIN — Medication 400 MILLIGRAM(S): at 05:05

## 2024-01-30 RX ADMIN — Medication 100 MILLIEQUIVALENT(S): at 12:07

## 2024-01-30 RX ADMIN — Medication 6 UNIT(S): at 05:04

## 2024-01-30 RX ADMIN — CEFTRIAXONE 100 MILLIGRAM(S): 500 INJECTION, POWDER, FOR SOLUTION INTRAMUSCULAR; INTRAVENOUS at 06:27

## 2024-01-30 RX ADMIN — INSULIN GLARGINE 10 UNIT(S): 100 INJECTION, SOLUTION SUBCUTANEOUS at 23:10

## 2024-01-30 RX ADMIN — SODIUM CHLORIDE 1000 MILLILITER(S): 9 INJECTION INTRAMUSCULAR; INTRAVENOUS; SUBCUTANEOUS at 05:05

## 2024-01-30 RX ADMIN — SODIUM CHLORIDE 75 MILLILITER(S): 9 INJECTION, SOLUTION INTRAVENOUS at 18:15

## 2024-01-30 RX ADMIN — Medication 100 MILLIEQUIVALENT(S): at 13:32

## 2024-01-30 RX ADMIN — Medication 1 APPLICATION(S): at 18:18

## 2024-01-30 RX ADMIN — PIPERACILLIN AND TAZOBACTAM 200 GRAM(S): 4; .5 INJECTION, POWDER, LYOPHILIZED, FOR SOLUTION INTRAVENOUS at 07:15

## 2024-01-30 RX ADMIN — Medication 100 MILLIEQUIVALENT(S): at 12:31

## 2024-01-30 NOTE — CONSULT NOTE ADULT - SUBJECTIVE AND OBJECTIVE BOX
HPI  79F w/ hx Diabetes, diabetic neuropathy, seasonal allergies, HTN, HLD, osteoporosis, Parkinson's, htn, GCA presenting with AMS. Pt unable to give much hx. Pt bibems from [facility], unknown LKN. Per paperwork, pt usually AOx3, not very verbal, follows commands. Now pt shakes head no to all questions, AOx0, localizes to pain. Moving BUE.    Urology was consulted due to concern for possible fistula and emphysematous cystitis on CT.     PAST MEDICAL & SURGICAL HISTORY:  Diabetes      H/O Parkinson's disease      Hypertension      Giant cell arteritis          MEDICATIONS  (STANDING):    MEDICATIONS  (PRN):      FAMILY HISTORY:      Allergies    No Known Allergies    Intolerances        SOCIAL HISTORY:    REVIEW OF SYSTEMS: Otherwise negative as stated in HPI    Physical Exam  Vital signs  T(C): 36.4 (24 @ 06:44), Max: 36.9 (24 @ 02:06)  HR: 86 (24 @ 06:44)  BP: 144/67 (24 @ 06:44)  SpO2: 98% (24 @ 06:44)  Wt(kg): --    Output      Gen: NAD  Pulm: No respiratory distress	  CV: RRR  GI: S/ND/NT  :   MSK: moves all 4 limbs spontaneously    LABS:       @ 03:22    WBC 15.45 / Hct 35.1  / SCr 0.44         149<H>  |  110<H>  |  29<H>  ----------------------------<  249<H>  3.2<L>   |  18<L>  |  0.44<L>    Ca    9.2      2024 03:22    TPro  6.3  /  Alb  3.6  /  TBili  0.5  /  DBili  x   /  AST  20  /  ALT  <5  /  AlkPhos  55      PT/INR - ( 2024 03:22 )   PT: 10.3 sec;   INR: 0.92 ratio         PTT - ( 2024 03:22 )  PTT:24.8 sec  Urinalysis Basic - ( 2024 04:21 )    Color: Red / Appearance: Turbid / S.038 / pH: x  Gluc: x / Ketone: 40 mg/dL  / Bili: Moderate / Urobili: 0.2 mg/dL   Blood: x / Protein: 300 mg/dL / Nitrite: Positive   Leuk Esterase: Moderate / RBC: 1224 /HPF / WBC 4041 /HPF   Sq Epi: x / Non Sq Epi: 11 /HPF / Bacteria: Too Numerous to count /HPF            Urine Cx:    Blood Cx:    RADIOLOGY:

## 2024-01-30 NOTE — ED PROVIDER NOTE - NSICDXPASTMEDICALHX_GEN_ALL_CORE_FT
PAST MEDICAL HISTORY:  Diabetes     Giant cell arteritis     H/O Parkinson's disease     Hypertension

## 2024-01-30 NOTE — H&P ADULT - PROBLEM SELECTOR PLAN 10
IDDM2  Home regimen: glargine 15u qHS, admelog 7u TID qAC  While NPO will hold admelog  Will order glargine 10u qHS while inpatient  Monitor FSG, maintain FITZ, hypoglycemia protocol

## 2024-01-30 NOTE — ED ADULT NURSE REASSESSMENT NOTE - NS ED NURSE REASSESS COMMENT FT1
Patient turned to sides, to prevent pressure on her sacrum,  pillow in between knees to prevent pressure.

## 2024-01-30 NOTE — ED PROVIDER NOTE - DISPOSITION TYPE
Internal Medicine Progress note       Patient: Anirudh Trujillo Date:3/6/2017     : 1973 Attending: Marilou Alfaro MD   43 year old male 2017          CC:     Fevers, melena     Recent Events/Subjective:     Bloody output from Left drain - culture +staph epi   Complains of abdominal pain, asking for adjustment in analgesics   + flatus and BMs,   Some nausea, but Tolerating PO  Leukocytosis improved, no fevers  No SOB, on RA  No other complaints or events                    Vital Last Value 24 Hour Range   Temperature 98.4 °F (36.9 °C) Temp  Min: 98.1 °F (36.7 °C)  Max: 100.1 °F (37.8 °C)   Pulse 104 Pulse  Min: 60  Max: 118   Respiratory 20 Resp  Min: 20  Max: 22   Blood Pressure 100/60 BP  Min: 100/60  Max: 130/72   Arterial BP   No Data Recorded   O2 Sat 2 L/min NA   Pulse Oximetry 92 % SpO2  Min: 92 %  Max: 93 %     Vital Today Admitted   Weight 72.3 kg Weight: 68 kg   BMI N/A BMI (Calculated): 22.86          Last I/O 3 shifts  I/O last 3 completed shifts:  In: 970 [P.O.:960; Other:10]  Out: 4450 [Urine:4350; Drains:100]    Physical Exam:   General appearance: Awake-lucid  Head: Normocephalic,  atraumatic   Eyes: PERRL, no drainage  Lungs: CTA, nonlabored   Heart: RRR and S1, S2 normal   Abdomen: Nonspecific tenderness: BS positive: no peritoneal Sx  Extremities: no edema/cyanosis   Pulses: 2+ and symmetric   Skin: WDI  Neurologic: AAOx3, no focal deficits   Left abdominal drain- bloody output        Labs    Recent Labs  Lab 17  0500 17  0600 17  0530 17  0500  17  0410  17  0308 17  2340  17  1435   WBC 12.9* 14.9* 12.2* 13.1*  --  14.7*  --  11.4*  --   --  12.5*   HGB 7.8* 8.2* 8.1* 8.4*  < > 7.0*  < > 7.6* 7.6*  < > 9.2*   HCT 25.6* 27.6* 27.2* 27.9*  --  23.6*  < > 25.6*  --   < > 30.0*   * 525* 515* 454*  --  404  --  339  --   --  435   SEG 69 75  --   --   --  89  --  91  --   --  73   SODIUM 146*  --   --  141  --  142  --  135  --   --   127*   POTASSIUM 3.0*  --   --  3.9  --  4.1  --  4.1 3.7  --  4.2   CHLORIDE 105  --   --  110*  --  106  --  103  --   --  90*   CO2 34*  --   --  28  --  29  --  26  --   --  25   ANIONGAP 10  --   --  7*  --  11  --  10  --   --  16   BUN 11  --   --  11  --  6  --  5*  --   --  7   CREATININE 0.98  --   --  0.95  --  0.57*  --  0.52*  --   --  0.60*   GFRNA >90  --   --  >90  --  >90  --  >90  --   --  >90   GFRA >90  --   --  >90  --  >90  --  >90  --   --  >90   GLUCOSE 84  --   --  134*  --  117*  --  121*  --   --  108*   CALCIUM 7.9*  --   --  7.8*  --  7.6*  --  7.7*  --   --  8.2*   ALBUMIN  --   --   --   --   --  1.7*  --  1.9*  --   --  2.6*   MG  --   --   --   --   --   --   --  2.3 1.8  --  1.6*   AST  --   --   --   --   --  15  --  26  --   --  55*   GPT  --   --   --   --   --  <6  --  12  --   --  19   ALKPT  --   --   --   --   --  93  --  130*  --   --  178*   BILIRUBIN  --   --   --   --   --  0.3  --  0.8  --   --  1.9*   LIPA  --   --   --   --   --   --   --   --   --   --  749*   < > = values in this interval not displayed.      Medications/Infusions:       Scheduled:   • pantoprazole  40 mg Intravenous 2 times per day   • pregabalin  50 mg Oral 2 times per day   • pramipexole  0.25 mg Oral Nightly   • aspirin  81 mg Oral Daily   • DULoxetine  60 mg Oral Nightly   • ferrous sulfate  325 mg Oral BID WC   • midodrine  2.5 mg Oral TID AC   • morphine SR  30 mg Oral 2 times per day   • sucralfate  1 g Oral BID   • traZODONE  200 mg Oral Nightly   • piperacillin-tazobactam (ZOSYN) extended interval IVPB  3.375 g Intravenous 3 times per day   • micafungin (MYCAMINE) IVPB  150 mg Intravenous Daily   • insulin lispro   Subcutaneous TID AC   • sodium chloride (PF)  10 mL Injection 3 times per day       Continuous Infusions:   • sodium chloride 0.9% infusion     • dextrose 5 % infusion     • lactated ringers infusion 50 mL/hr at 03/06/17 0855     .         Radiology/Imaging: reviewed               Assessment:     Melena  Hx of severe gastritis  Fevers-abdominal source  Abdominal fluid collections-infected hematoma   Hypotension-pain medication/GI bleed related  Pancreatitis-acute on chronic ( ETOH related)  Leukocytosis      Plan & Recommendations:   Drain per IR, monitor output, follow final cultures, abx,   Diet as tolerated  Antiemetics  PPI   GI follow up   Diet as tolerated   Replete electrolytes   Supportive treatment  Pain control - optimize prn, outpatient regimen will remain the same on discharge   Follow labs  Recommend increased activity         Fanny Knott NP  3/6/2017    As above  Cx noted  Narrow Abx-IV vanco  Complains of abdominal pain-KUB with stool  Relistor times one dose  Minimize narcotics-Pain evaluation   ADMIT

## 2024-01-30 NOTE — H&P ADULT - NSHPPHYSICALEXAM_GEN_ALL_CORE
Vital Signs Last 24 Hrs  T(C): 37.1 (30 Jan 2024 08:35), Max: 37.1 (30 Jan 2024 08:35)  T(F): 98.7 (30 Jan 2024 08:35), Max: 98.7 (30 Jan 2024 08:35)  HR: 88 (30 Jan 2024 16:45) (86 - 102)  BP: 138/77 (30 Jan 2024 16:45) (109/65 - 144/67)  BP(mean): --  RR: 16 (30 Jan 2024 16:45) (14 - 18)  SpO2: 98% (30 Jan 2024 16:45) (95% - 100%)    Parameters below as of 30 Jan 2024 16:45  Patient On (Oxygen Delivery Method): room air        CONSTITUTIONAL: Well-groomed, in no apparent distress  EYES: No conjunctival or scleral injection, non-icteric;   ENMT: No external nasal lesions; MMM  NECK: Trachea midline without palpable neck mass; thyroid not enlarged and non-tender  RESPIRATORY: Breathing comfortably; no dullness to percussion; lungs CTA without wheeze/rhonchi/rales  CARDIOVASCULAR: +S1S2, RRR, no M/G/R; pedal pulses full and symmetric; no lower extremity edema  GASTROINTESTINAL: No palpable masses or tenderness, +BS throughout, no rebound/guarding; no hepatosplenomegaly; no hernia palpated  LYMPHATIC: No cervical LAD or tenderness  SKIN: No rashes or ulcers noted  NEUROLOGIC: PERRLA b/l, unable to assess rest of cranial nerve exam given mental status, sensation intact in LEs b/l to light touch  PSYCHIATRIC: A+O x 0, opens eyes to commands

## 2024-01-30 NOTE — H&P ADULT - ASSESSMENT
78 yo F PMH IDDM, HTN, HLD, OP, Parkinson's, [facility]CA BIBEMS from [facility]. Per documentation, patient was found on the floor after a fall. Unclear etiology as unable to obtain collateral from NH. Patient presents with AMS A&Ox0, usually A&Ox3 at baseline per daughter. Consider metabolic from electrolyte derangements vs infectious from UTI. CTH was negative for bleed or infarct but showed hydrocephalus of lateral and third ventricles and narrowed callosal angle suspicious for normal pressure hydrocephalus. f/u Neuro consult, consulted IR for LP. CT A/P showed acute emphysematous cystitis. Fluid in the vagina with marked vaginal mucosal enhancement and questionable locules of luminal air inseparable from the adjacent emphysematous bladder wall. Cannot exclude vesicovaginal fistula.

## 2024-01-30 NOTE — H&P ADULT - NSHPLABSRESULTS_GEN_ALL_CORE
LABS:                        11.7   15.45 )-----------( 231      ( 30 Jan 2024 03:22 )             35.1     01-30    150<H>  |  113<H>  |  25<H>  ----------------------------<  190<H>  3.0<L>   |  23  |  0.41<L>    Ca    8.7      30 Jan 2024 10:12    TPro  6.3  /  Alb  3.6  /  TBili  0.5  /  DBili  x   /  AST  20  /  ALT  <5  /  AlkPhos  55  01-30    PT/INR - ( 30 Jan 2024 03:22 )   PT: 10.3 sec;   INR: 0.92 ratio         PTT - ( 30 Jan 2024 03:22 )  PTT:24.8 sec  CARDIAC MARKERS ( 30 Jan 2024 03:22 )  x     / x     / 92 U/L / x     / 26.1 ng/mL      Urinalysis Basic - ( 30 Jan 2024 10:12 )    Color: x / Appearance: x / SG: x / pH: x  Gluc: 190 mg/dL / Ketone: x  / Bili: x / Urobili: x   Blood: x / Protein: x / Nitrite: x   Leuk Esterase: x / RBC: x / WBC x   Sq Epi: x / Non Sq Epi: x / Bacteria: x

## 2024-01-30 NOTE — ED ADULT TRIAGE NOTE - CHIEF COMPLAINT QUOTE
Patient brought in by EMS from [facility] for hyperglycemia. Pt. responsive to painful stimuli. Per EMS, pt. usually A&Ox4, per Herminio, unknown last known normal. Pt. . PMH DM type 2, HTN, dementia.

## 2024-01-30 NOTE — ED ADULT NURSE NOTE - NSFALLHARMRISKINTERV_ED_ALL_ED
Assistance OOB with selected safe patient handling equipment if applicable/Assistance with ambulation/Communicate risk of Fall with Harm to all staff, patient, and family/Monitor gait and stability/Monitor for mental status changes and reorient to person, place, and time, as needed/Provide visual cue: red socks, yellow wristband, yellow gown, etc/Reinforce activity limits and safety measures with patient and family/Toileting schedule using arm’s reach rule for commode and bathroom/Use of alarms - bed, stretcher, chair and/or video monitoring/Bed in lowest position, wheels locked, appropriate side rails in place/Call bell, personal items and telephone in reach/Instruct patient to call for assistance before getting out of bed/chair/stretcher/Non-slip footwear applied when patient is off stretcher/[place] to call system/Physically safe environment - no spills, clutter or unnecessary equipment/Purposeful Proactive Rounding/Room/bathroom lighting operational, light cord in reach

## 2024-01-30 NOTE — CONSULT NOTE ADULT - ASSESSMENT
80 yo F PMH IDDM, HTN, HLD, OP, Parkinson's, [facility] BIBEMS for fall, AMS.  Cardiology consulted for elevated trops, abnormal ECG.    Impressions:  Trop 70-54, CKMB 26.1  ECG with ***  Patient with multiple metabolic derrangements, poss    Recommendations:  - patient not a candidate for ischemic evaluation at this time. can consider prior to discharge if mental status improves and if within GOC of family  - no need to load with DAPT or heparin given low concern for ACS  - can start ASA, statin once able to tolerate PO  - TTE in AM  - correction of metabolic derangements/workup of AMS per primary    Mckinley You MD  PGY-4, Cardiology Fellow    Please check [email or web address]: [email or web address]" for cardiology service schedule and contact information.   *Please note that all recommendations are incomplete until attending attestation* 78 yo F PMH IDDM, HTN, HLD, OP, Parkinson's, GCA BIBEMS for AMS.  Cardiology consulted for elevated trops, abnormal ECG.    Impressions:  Trop 70-54, CKMB 26.1  ECG with NSR, nonspecific TWI in inferior and anterolateral leads  Patient with multiple metabolic derangements possible infection/sepsis  Unable to complete ROS but family denies cardiac history or recent complaints of symptoms such as chest pain, SOB, diaphoresis, etc.  Likely myocardial injury 2/2 metabolic derangements/sepsis rather than acute primary myocardial infarction  ECG changes may also be related to ?normal pressure hydrocephalus suggested on CTH    Recommendations:  - patient not a candidate for ischemic evaluation at this time. can consider prior to discharge if mental status improves and if within GOC of family  - no need to load with DAPT or heparin given low concern for ACS  - can start ASA, statin once able to tolerate PO  - TTE in AM  - correction of metabolic derangements/workup of AMS per primary    Mckinley You MD  PGY-4, Cardiology Fellow    Please check [email or web address]: [email or web address] for cardiology service schedule and contact information.   *Please note that all recommendations are incomplete until attending attestation*

## 2024-01-30 NOTE — ED PROVIDER NOTE - PHYSICAL EXAMINATION
General appearance: afebrile, eyes closed   Eyes: anicteric sclerae, JAVI, EOMI   HENT: Atraumatic   Neck: Trachea midline; Full range of motion, supple   Pulm: CTA bl, normal respiratory effort and no intercostal retractions, normal work of breathing   CV: RRR, No murmurs, rubs, or gallops.    Abdomen: winces on palpation, Soft, non-distended; no guarding or rebound   Extremities: No peripheral edema or extremity lymphadenopathy. moving BUE   Skin: Dry, normal temperature, turgor and texture; no rash, ulcers or subcutaneous nodules

## 2024-01-30 NOTE — ED PROVIDER NOTE - PROGRESS NOTE DETAILS
Elmer Ravi- received call from rads, pt with emphysematous cystitis, air in vaginal wall, c/f cystovaginal fistula. Dr. Maza: Pt was signed out to me awaiting Urology for concern for cystovaginal fistula. Pt resting in NAD. Art Whalen PGY3: Spoke with urology, no acute surgical intervention, continue with Valles and antibiotic therapy at this time.  Will admit patient to medicine.

## 2024-01-30 NOTE — H&P ADULT - PROBLEM SELECTOR PLAN 4
UA in the ED +nitrites, moderate leukocyte esterase +protein, +ketones 4k WBC, 1.2k RBC, +glucose  UA appears contaminated, would repeat UA  Patient was treated with CTX, Zosyn in the ED  Will treat empirically with CTX 1g daily

## 2024-01-30 NOTE — CONSULT NOTE ADULT - TIME BILLING
79-year-old ? handed lady evaluated at [facility] on 1/31/2023 with change in mental status.  History and exam as above.  ROS otherwise negative.  CT head (1/30/2023) to my eye showed mild hydrocephalus, perhaps NPH.  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    Impression.  She has a moderate delirium, consistent with diffuse cerebral dysfunction, most likely due to systemic infection (UTI) and/or other toxic-metabolic disturbances, including possibly dehydration.  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  Neurovascular imaging showed multifocal intracranial more than extracranial atherosclerosis, particularly involving a hypoplastic vertebrobasilar system, and the stenoses are probably asymptomatic.  At this time, there is no clinical or imaging evidence to suggest acute ischemic stroke, although this is difficult to rule out with certainty.  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  Suggest.  MRI brain; aspirin 81 mg daily; atorvastatin 80 mg daily, target LDL is uncertain, either <70 versus or as per 10-year ASCVD risk calculation; consider ophthalmology consultation; IV hydration and consider NGT feeding for now; PT/OT/speech therapy.

## 2024-01-30 NOTE — CONSULT NOTE ADULT - ASSESSMENT
Pt is 78 y/o F w/ Parkinson's htn, hld who presented to the ED with AMS, and found to have emphysematous cystitis on imaging. AMS i/s/o elevated white count and dirty u/a likely uti vs urosepsis. 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    Recommendations  - No acute urologic intervention indicated at this time  - Keep indwelling segura catheter  - f/u urine culture  - empiric abx     Plan discussed w/ Dr. Harris    Urology  #[id number]

## 2024-01-30 NOTE — ED PROVIDER NOTE - OBJECTIVE STATEMENT
79F w/ hx Diabetes, diabetic neuropathy, seasonal allergies, HTN, HLD, osteoporosis, Parkinson's, htn, GCA presenting with AMS. Pt unable to give much hx. Pt bibems from [facility], unknown LKN. Per paperwork, pt usually AOx3, not very verbal, follows commands. Now pt shakes head no to all questions, AOx0, localizes to pain. Moving BUE.

## 2024-01-30 NOTE — H&P ADULT - TIME BILLING
100 minutes of total time dedicated to this patient visit today including preparing to see the patient (eg. review of tests), obtaining and/or reviewing separately obtained history, obtaining/reviewing vitals, performing a medically appropriate examination and/or evaluation, counseling and educating the patient/family/caregiver, reviewing previous notes and test results, and procedures, communicating with other health professionals (when not separately reported), and documenting clinical information in the electronic health record.

## 2024-01-30 NOTE — CONSULT NOTE ADULT - SUBJECTIVE AND OBJECTIVE BOX
Neurology Consultation     HPI:  Patient Nadja Ramirez is a 78 yo F PMH IDDM, HTN, HLD, OP, Parkinson's, GCA BIBEMS from [facility]. Per chart review, patient was found on the floor after a fall. No HS or LOC. Of note, patient has had multiple falls recently. Per primary team who obtained hx from daughter, patient is A&Ox3 at baseline and conversant. In the ED, CT A/P showed acute emphysematous cystitis. Found with WBC 15.4, troponin 70->54, Elevated FSG. CK92. Na 150, K 3.0, positive UA. Patient was treated with CTX, Zosyn, and 1L NS in the ED.  Neuro consulted for CTH concerning for showed hydrocephalus of lateral and third ventricles and narrowed callosal angle suspicious for normal pressure hydrocephalus.        PAST MEDICAL & SURGICAL HISTORY:  HTN (hypertension)    HLD (hyperlipidemia)    DM (diabetes mellitus)    CL (cholelithiasis)    Osteoporosis    Diabetic neuropathy    Diabetes    H/O Parkinson's disease    Hypertension    Giant cell arteritis    H/O left inguinal hernia repair    History of  section    FAMILY HISTORY:  Family history of diabetes mellitus in mother    FH: dementia  mother    FH: HTN (hypertension)  mother    Family history of stomach cancer  sister    Family history of oral cancer  sister    Family history of depression  mother    MEDICATIONS   MEDICATIONS  (STANDING):  atorvastatin 40 milliGRAM(s) Oral at bedtime  calcium carbonate 1250 mG  + Vitamin D (OsCal 500 + D) 1 Tablet(s) Oral daily  carbidopa/levodopa  25/100 1.5 Tablet(s) Oral three times a day  carbidopa/levodopa CR 25/100 1 Tablet(s) Oral at bedtime  dextrose 5% + sodium chloride 0.45%. 1000 milliLiter(s) (75 mL/Hr) IV Continuous <Continuous>  dextrose 5%. 1000 milliLiter(s) (50 mL/Hr) IV Continuous <Continuous>  dextrose 50% Injectable 25 Gram(s) IV Push once  fenofibrate Tablet 145 milliGRAM(s) Oral daily  ferrous    sulfate Liquid 220 milliGRAM(s) Oral daily  folic acid 1 milliGRAM(s) Oral daily  glucagon  Injectable 1 milliGRAM(s) IntraMuscular once  insulin glargine Injectable (LANTUS) 10 Unit(s) SubCutaneous at bedtime  insulin lispro (ADMELOG) corrective regimen sliding scale   SubCutaneous three times a day before meals  metoprolol succinate ER 50 milliGRAM(s) Oral daily  mirtazapine 7.5 milliGRAM(s) Oral daily  pantoprazole    Tablet 40 milliGRAM(s) Oral before breakfast  polyethylene glycol 3350 17 Gram(s) Oral daily  predniSONE   Tablet 20 milliGRAM(s) Oral daily  senna 2 Tablet(s) Oral at bedtime  silver sulfADIAZINE 1% Cream 1 Application(s) Topical two times a day    MEDICATIONS  (PRN):  acetaminophen     Tablet .. 650 milliGRAM(s) Oral every 6 hours PRN Temp greater or equal to 38C (100.4F), Mild Pain (1 - 3)  dextrose Oral Gel 15 Gram(s) Oral once PRN Blood Glucose LESS THAN 70 milliGRAM(s)/deciliter    ALLERGIES/INTOLERANCES:  Allergies  No Known Allergies    Intolerances    VITALS & EXAMINATION:  Vital Signs Last 24 Hrs  T(C): 37.1 (2024 08:35), Max: 37.1 (2024 08:35)  T(F): 98.7 (2024 08:35), Max: 98.7 (2024 08:35)  HR: 88 (2024 16:45) (86 - 102)  BP: 138/77 (2024 16:45) (109/65 - 144/67)  BP(mean): --  RR: 16 (2024 16:45) (14 - 18)  SpO2: 98% (2024 16:45) (95% - 100%)    Parameters below as of 2024 16:45  Patient On (Oxygen Delivery Method): room air         LABORATORY:  CBC                       11.7   15.45 )-----------( 231      ( 2024 03:22 )             35.1     Chem     150<H>  |  113<H>  |  25<H>  ----------------------------<  190<H>  3.0<L>   |  23  |  0.41<L>    Ca    8.7      2024 10:12    TPro  6.3  /  Alb  3.6  /  TBili  0.5  /  DBili  x   /  AST  20  /  ALT  <5  /  AlkPhos  55  01-30    LFTs LIVER FUNCTIONS - ( 2024 03:22 )  Alb: 3.6 g/dL / Pro: 6.3 g/dL / ALK PHOS: 55 U/L / ALT: <5 U/L / AST: 20 U/L / GGT: x           Coagulopathy PT/INR - ( 2024 03:22 )   PT: 10.3 sec;   INR: 0.92 ratio         PTT - ( 2024 03:22 )  PTT:24.8 sec  Lipid Panel   A1c   Cardiac enzymes CARDIAC MARKERS ( 2024 03:22 )  x     / x     / 92 U/L / x     / 26.1 ng/mL      U/A Urinalysis Basic - ( 2024 10:12 )    Color: x / Appearance: x / SG: x / pH: x  Gluc: 190 mg/dL / Ketone: x  / Bili: x / Urobili: x   Blood: x / Protein: x / Nitrite: x   Leuk Esterase: x / RBC: x / WBC x   Sq Epi: x / Non Sq Epi: x / Bacteria: x      CSF  Other    STUDIES & IMAGING: (EEG, CT, MR, U/S, TTE/MARIANO):    < from: CT Head No Cont (24 @ 06:23) >    ACC: [id number] EXAM:  CT BRAIN   ORDERED BY: JASON CALDERON     PROCEDURE DATE:  2024          INTERPRETATION:  CLINICAL INFORMATION: Altered mental status..    TECHNIQUE: Serial axial images were obtained from the skull base to the   vertex without intravenous contrast.  Coronal and sagittal reformations   were obtained.    COMPARISON: No similar prior studies available for comparison.    FINDINGS:    There is no acute intracranial hemorrhage or mass effect.    Mild hydrocephalus involving the lateral and third ventricle with overall   normal appearing fourth ventricle. Callosal angle at the level the   anterior commissure is 76 degrees, suspicious for normal pressure   hydrocephalus.    Gray-white matter differentiation is preserved. Minimal areas of white   matter hypodensity are seen likely representing microvascular-type   changes.    The calvarium is intact.    The visualized portions of the paranasal sinuses and mastoid air cells   are clear.    IMPRESSION:    No acute intracranial hemorrhage.    Mild hydrocephalus involving the lateral and third ventricle with overall   normal-appearing fourth ventricle. Narrowed callosal angle at the level   the anterior commissure suspicious for normal pressure hydrocephalus.    --- End of Report ---    CHANCE BRIAN MD; Resident Radiologist  This document has been electronically signed.  BARB MCFARLAND MD; Attending Radiologist  This document has been electronically signed. 2024  6:44AM    < end of copied text >     Neurology Consultation     History from daughter bedside.    HPI:   Patient Nadja Ramirez is a 78 yo F PMH IDDM, HTN, HLD, OP, Parkinson's, GCA, peripheral neuropathy, diabetic retinopathy w/ reduced vision, BIBEMS from [facility]. Per daughter, patient has been at [facility]. She has been having poor PO intake, less interactive, less verbal and less ambulatory. Patient was found on the floor after a fall. No HS or LOC. Of note, patient has had multiple falls recently. Per primary team who obtained hx from daughter, patient is more interactive than baseline. In the ED, CT A/P showed acute emphysematous cystitis. Found with WBC 15.4, troponin 70->54, Elevated FSG. CK92. Na 150, K 3.0, positive UA. Patient was treated with CTX, Zosyn, and 1L NS in the ED.  Neuro consulted for CTH concerning for showed hydrocephalus of lateral and third ventricles and narrowed callosal angle suspicious for normal pressure hydrocephalus.      PAST MEDICAL & SURGICAL HISTORY:  HTN (hypertension)    HLD (hyperlipidemia)    DM (diabetes mellitus)    CL (cholelithiasis)    Osteoporosis    Diabetic neuropathy    Diabetes    H/O Parkinson's disease    Hypertension    Giant cell arteritis    H/O left inguinal hernia repair    History of  section    FAMILY HISTORY:  Family history of diabetes mellitus in mother    FH: dementia  mother    FH: HTN (hypertension)  mother    Family history of stomach cancer  sister    Family history of oral cancer  sister    Family history of depression  mother    MEDICATIONS   MEDICATIONS  (STANDING):  atorvastatin 40 milliGRAM(s) Oral at bedtime  calcium carbonate 1250 mG  + Vitamin D (OsCal 500 + D) 1 Tablet(s) Oral daily  carbidopa/levodopa  25/100 1.5 Tablet(s) Oral three times a day  carbidopa/levodopa CR 25/100 1 Tablet(s) Oral at bedtime  dextrose 5% + sodium chloride 0.45%. 1000 milliLiter(s) (75 mL/Hr) IV Continuous <Continuous>  dextrose 5%. 1000 milliLiter(s) (50 mL/Hr) IV Continuous <Continuous>  dextrose 50% Injectable 25 Gram(s) IV Push once  fenofibrate Tablet 145 milliGRAM(s) Oral daily  ferrous    sulfate Liquid 220 milliGRAM(s) Oral daily  folic acid 1 milliGRAM(s) Oral daily  glucagon  Injectable 1 milliGRAM(s) IntraMuscular once  insulin glargine Injectable (LANTUS) 10 Unit(s) SubCutaneous at bedtime  insulin lispro (ADMELOG) corrective regimen sliding scale   SubCutaneous three times a day before meals  metoprolol succinate ER 50 milliGRAM(s) Oral daily  mirtazapine 7.5 milliGRAM(s) Oral daily  pantoprazole    Tablet 40 milliGRAM(s) Oral before breakfast  polyethylene glycol 3350 17 Gram(s) Oral daily  predniSONE   Tablet 20 milliGRAM(s) Oral daily  senna 2 Tablet(s) Oral at bedtime  silver sulfADIAZINE 1% Cream 1 Application(s) Topical two times a day    MEDICATIONS  (PRN):  acetaminophen     Tablet .. 650 milliGRAM(s) Oral every 6 hours PRN Temp greater or equal to 38C (100.4F), Mild Pain (1 - 3)  dextrose Oral Gel 15 Gram(s) Oral once PRN Blood Glucose LESS THAN 70 milliGRAM(s)/deciliter    ALLERGIES/INTOLERANCES:  Allergies  No Known Allergies    Intolerances    VITALS & EXAMINATION:  Vital Signs Last 24 Hrs  T(C): 37.1 (2024 08:35), Max: 37.1 (2024 08:35)  T(F): 98.7 (2024 08:35), Max: 98.7 (2024 08:35)  HR: 88 (2024 16:45) (86 - 102)  BP: 138/77 (2024 16:45) (109/65 - 144/67)  BP(mean): --  RR: 16 (2024 16:45) (14 - 18)  SpO2: 98% (2024 16:45) (95% - 100%)    Parameters below as of 2024 16:45  Patient On (Oxygen Delivery Method): room air    General:  Constitutional: Female, appears stated age, eyes closed, mouth open, does not appear in pain   Head: Normocephalic;   Eyes: mildly reddened sclera;   Resp: breathing comfortably  Neck: no nuchal rigidity     Neurological (>12):  MS: Eyes closed, to verbal stimuli briefly opens it but doesnt really track speaker. Not able to obtain orientation given non-verbal. Briefly able to follow some simple one step commands.   Language: Speech is absent   CNs: Pupils round hazy, minimally to nonreactive b/l. Repeatedly blinks and difficult to determine if blinks to confrontation in VF. EOMI but not tracking to end gaze all directions. No disconjugate gaze. No ivonne facial asymmetry b/l. Tongue midline.     Motor - Reduced bulk and reduced tone throughout. Moves upper extremities b/l antigravity 4+ proximally and 4 distally. Does not raise either leg antigravity but able to move slightly in plane of bed. Appears symmetric upper and lower extremities. Not following for manual motor testing.      Sensation: Intact to noxious stimuli b/l   Reflexes L/R:  Biceps(C5) 2/2  BR(C6) 1/1   Triceps(C7)  2/2 Patellar(L4)   1/1   Ankles 0/0   Toes: downgoing b/l  No ankle clonus  Neg ortiz reflex b/l  Coordination/ Gait: cannot assess    LABORATORY:  CBC                       11.7   15.45 )-----------( 231      ( 2024 03:22 )             35.1     Chem     150<H>  |  113<H>  |  25<H>  ----------------------------<  190<H>  3.0<L>   |  23  |  0.41<L>    Ca    8.7      2024 10:12    TPro  6.3  /  Alb  3.6  /  TBili  0.5  /  DBili  x   /  AST  20  /  ALT  <5  /  AlkPhos  55  01-30    LFTs LIVER FUNCTIONS - ( 2024 03:22 )  Alb: 3.6 g/dL / Pro: 6.3 g/dL / ALK PHOS: 55 U/L / ALT: <5 U/L / AST: 20 U/L / GGT: x           Coagulopathy PT/INR - ( 2024 03:22 )   PT: 10.3 sec;   INR: 0.92 ratio         PTT - ( 2024 03:22 )  PTT:24.8 sec  Lipid Panel   A1c   Cardiac enzymes CARDIAC MARKERS ( 2024 03:22 )  x     / x     / 92 U/L / x     / 26.1 ng/mL      U/A Urinalysis Basic - ( 2024 10:12 )    Color: x / Appearance: x / SG: x / pH: x  Gluc: 190 mg/dL / Ketone: x  / Bili: x / Urobili: x   Blood: x / Protein: x / Nitrite: x   Leuk Esterase: x / RBC: x / WBC x   Sq Epi: x / Non Sq Epi: x / Bacteria: x      CSF  Other    STUDIES & IMAGING: (EEG, CT, MR, U/S, TTE/MARIANO):    < from: CT Head No Cont (24 @ 06:23) >    ACC: [id number] EXAM:  CT BRAIN   ORDERED BY: JASON YUMIKO     PROCEDURE DATE:  2024          INTERPRETATION:  CLINICAL INFORMATION: Altered mental status..    TECHNIQUE: Serial axial images were obtained from the skull base to the   vertex without intravenous contrast.  Coronal and sagittal reformations   were obtained.    COMPARISON: No similar prior studies available for comparison.    FINDINGS:    There is no acute intracranial hemorrhage or mass effect.    Mild hydrocephalus involving the lateral and third ventricle with overall   normal appearing fourth ventricle. Callosal angle at the level the   anterior commissure is 76 degrees, suspicious for normal pressure   hydrocephalus.    Gray-white matter differentiation is preserved. Minimal areas of white   matter hypodensity are seen likely representing microvascular-type   changes.    The calvarium is intact.    The visualized portions of the paranasal sinuses and mastoid air cells   are clear.    IMPRESSION:    No acute intracranial hemorrhage.    Mild hydrocephalus involving the lateral and third ventricle with overall   normal-appearing fourth ventricle. Narrowed callosal angle at the level   the anterior commissure suspicious for normal pressure hydrocephalus.    --- End of Report ---    CHANCE BRIAN MD; Resident Radiologist  This document has been electronically signed.  BARB MCFARLAND MD; Attending Radiologist  This document has been electronically signed. 2024  6:44AM    < end of copied text >     Neurology Consultation     History from daughter bedside.    HPI:   Patient Nadja Ramirez is a 80 yo F PMH IDDM, HTN, HLD, OP, Parkinson's, GCA, peripheral neuropathy, diabetic retinopathy w/ reduced vision, BIBEMS from [facility]. Per daughter, patient has been at [facility]. She has been having poor PO intake, less interactive, less verbal and less ambulatory. Patient was found on the floor after a fall. No HS or LOC. Of note, patient has had multiple falls recently. Per primary team who obtained hx from daughter, patient is more interactive than baseline. In the ED, CT A/P showed acute emphysematous cystitis. Found with WBC 15.4, troponin 70->54, Elevated FSG. CK92. Na 150, K 3.0, positive UA. Patient was treated with CTX, Zosyn, and 1L NS in the ED.  Neuro consulted for CTH concerning for showed hydrocephalus of lateral and third ventricles and narrowed callosal angle suspicious for normal pressure hydrocephalus.      PAST MEDICAL & SURGICAL HISTORY:  HTN (hypertension)    HLD (hyperlipidemia)    DM (diabetes mellitus)    CL (cholelithiasis)    Osteoporosis    Diabetic neuropathy    Diabetes    H/O Parkinson's disease    Hypertension    Giant cell arteritis    H/O left inguinal hernia repair    History of  section    FAMILY HISTORY:  Family history of diabetes mellitus in mother    FH: dementia  mother    FH: HTN (hypertension)  mother    Family history of stomach cancer  sister    Family history of oral cancer  sister    Family history of depression  mother    MEDICATIONS   MEDICATIONS  (STANDING):  atorvastatin 40 milliGRAM(s) Oral at bedtime  calcium carbonate 1250 mG  + Vitamin D (OsCal 500 + D) 1 Tablet(s) Oral daily  carbidopa/levodopa  25/100 1.5 Tablet(s) Oral three times a day  carbidopa/levodopa CR 25/100 1 Tablet(s) Oral at bedtime  dextrose 5% + sodium chloride 0.45%. 1000 milliLiter(s) (75 mL/Hr) IV Continuous <Continuous>  dextrose 5%. 1000 milliLiter(s) (50 mL/Hr) IV Continuous <Continuous>  dextrose 50% Injectable 25 Gram(s) IV Push once  fenofibrate Tablet 145 milliGRAM(s) Oral daily  ferrous    sulfate Liquid 220 milliGRAM(s) Oral daily  folic acid 1 milliGRAM(s) Oral daily  glucagon  Injectable 1 milliGRAM(s) IntraMuscular once  insulin glargine Injectable (LANTUS) 10 Unit(s) SubCutaneous at bedtime  insulin lispro (ADMELOG) corrective regimen sliding scale   SubCutaneous three times a day before meals  metoprolol succinate ER 50 milliGRAM(s) Oral daily  mirtazapine 7.5 milliGRAM(s) Oral daily  pantoprazole    Tablet 40 milliGRAM(s) Oral before breakfast  polyethylene glycol 3350 17 Gram(s) Oral daily  predniSONE   Tablet 20 milliGRAM(s) Oral daily  senna 2 Tablet(s) Oral at bedtime  silver sulfADIAZINE 1% Cream 1 Application(s) Topical two times a day    MEDICATIONS  (PRN):  acetaminophen     Tablet .. 650 milliGRAM(s) Oral every 6 hours PRN Temp greater or equal to 38C (100.4F), Mild Pain (1 - 3)  dextrose Oral Gel 15 Gram(s) Oral once PRN Blood Glucose LESS THAN 70 milliGRAM(s)/deciliter    ALLERGIES/INTOLERANCES:  Allergies  No Known Allergies    Intolerances    VITALS & EXAMINATION:  Vital Signs Last 24 Hrs  T(C): 37.1 (2024 08:35), Max: 37.1 (2024 08:35)  T(F): 98.7 (2024 08:35), Max: 98.7 (2024 08:35)  HR: 88 (2024 16:45) (86 - 102)  BP: 138/77 (2024 16:45) (109/65 - 144/67)  BP(mean): --  RR: 16 (2024 16:45) (14 - 18)  SpO2: 98% (2024 16:45) (95% - 100%)    Parameters below as of 2024 16:45  Patient On (Oxygen Delivery Method): room air    General:  Constitutional: Female, appears stated age, eyes closed, mouth open, does not appear in pain   Head: Normocephalic;   Eyes: mildly reddened sclera;   Resp: breathing comfortably  Neck: no nuchal rigidity     Neurological (>12):  MS: Eyes closed, to verbal stimuli briefly opens it but doesnt really track speaker. Not able to obtain orientation given non-verbal. Briefly able to follow some simple one step commands (raise arms). Appears delirious.  Language: Speech is absent   CNs: Pupils round hazy, minimally to nonreactive b/l. Repeatedly blinks and difficult to determine if blinks to confrontation in VF. EOMI but not tracking to end gaze all directions. No disconjugate gaze. No ivonne facial asymmetry b/l. Tongue midline.     Motor - Reduced bulk and reduced tone throughout. Moves upper extremities b/l antigravity 4+ proximally and 4 distally. Does not raise either leg antigravity but able to move slightly in plane of bed. Appears symmetric upper and lower extremities. Not following for manual motor testing.      Sensation: Intact to noxious stimuli b/l   Reflexes L/R:  Biceps(C5) 2/2  BR(C6) 1/1   Triceps(C7)  2/2 Patellar(L4)   1/   Ankles 0/0   Toes: downgoing b/l  No ankle clonus  Neg ortiz reflex b/l  Coordination/ Gait: cannot assess given clinical status    LABORATORY:  CBC                       11.7   15.45 )-----------( 231      ( 2024 03:22 )             35.1     Chem     150<H>  |  113<H>  |  25<H>  ----------------------------<  190<H>  3.0<L>   |  23  |  0.41<L>    Ca    8.7      2024 10:12    TPro  6.3  /  Alb  3.6  /  TBili  0.5  /  DBili  x   /  AST  20  /  ALT  <5  /  AlkPhos  55  -30    LFTs LIVER FUNCTIONS - ( 2024 03:22 )  Alb: 3.6 g/dL / Pro: 6.3 g/dL / ALK PHOS: 55 U/L / ALT: <5 U/L / AST: 20 U/L / GGT: x           Coagulopathy PT/INR - ( 2024 03:22 )   PT: 10.3 sec;   INR: 0.92 ratio         PTT - ( 2024 03:22 )  PTT:24.8 sec  Lipid Panel   A1c   Cardiac enzymes CARDIAC MARKERS ( 2024 03:22 )  x     / x     / 92 U/L / x     / 26.1 ng/mL      U/A Urinalysis Basic - ( 2024 10:12 )    Color: x / Appearance: x / SG: x / pH: x  Gluc: 190 mg/dL / Ketone: x  / Bili: x / Urobili: x   Blood: x / Protein: x / Nitrite: x   Leuk Esterase: x / RBC: x / WBC x   Sq Epi: x / Non Sq Epi: x / Bacteria: x      CSF  Other    STUDIES & IMAGING: (EEG, CT, MR, U/S, TTE/MAIRANO):    < from: CT Head No Cont (24 @ 06:23) >    ACC: [id number] EXAM:  CT BRAIN   ORDERED BY: JASON CALDERON     PROCEDURE DATE:  2024          INTERPRETATION:  CLINICAL INFORMATION: Altered mental status..    TECHNIQUE: Serial axial images were obtained from the skull base to the   vertex without intravenous contrast.  Coronal and sagittal reformations   were obtained.    COMPARISON: No similar prior studies available for comparison.    FINDINGS:    There is no acute intracranial hemorrhage or mass effect.    Mild hydrocephalus involving the lateral and third ventricle with overall   normal appearing fourth ventricle. Callosal angle at the level the   anterior commissure is 76 degrees, suspicious for normal pressure   hydrocephalus.    Gray-white matter differentiation is preserved. Minimal areas of white   matter hypodensity are seen likely representing microvascular-type   changes.    The calvarium is intact.    The visualized portions of the paranasal sinuses and mastoid air cells   are clear.    IMPRESSION:    No acute intracranial hemorrhage.    Mild hydrocephalus involving the lateral and third ventricle with overall   normal-appearing fourth ventricle. Narrowed callosal angle at the level   the anterior commissure suspicious for normal pressure hydrocephalus.    --- End of Report ---    CHANCE BRIAN MD; Resident Radiologist  This document has been electronically signed.  BARB MCFARLAND MD; Attending Radiologist  This document has been electronically signed. 2024  6:44AM    < end of copied text >     Neurology Consultation     History from daughter bedside who assisted in [place] translation.    HPI:   Patient Nadja Ramirez is a 80 yo F PMH IDDM, HTN, HLD, OP, Parkinson's, GCA, peripheral neuropathy, diabetic retinopathy w/ reduced vision, BIBEMS from [facility]. Per daughter, patient has been at [facility]. She has been having poor PO intake, less interactive, less verbal and less ambulatory. Patient was found on the floor after a fall. No HS or LOC. Of note, patient has had multiple falls recently. Per primary team who obtained hx from daughter, patient is more interactive than baseline. In the ED, CT A/P showed acute emphysematous cystitis. Found with WBC 15.4, troponin 70->54, Elevated FSG. CK92. Na 150, K 3.0, positive UA. Patient was treated with CTX, Zosyn, and 1L NS in the ED.  Neuro consulted for CTH concerning for showed hydrocephalus of lateral and third ventricles and narrowed callosal angle suspicious for normal pressure hydrocephalus.      PAST MEDICAL & SURGICAL HISTORY:  HTN (hypertension)    HLD (hyperlipidemia)    DM (diabetes mellitus)    CL (cholelithiasis)    Osteoporosis    Diabetic neuropathy    Diabetes    H/O Parkinson's disease    Hypertension    Giant cell arteritis    H/O left inguinal hernia repair    History of  section    FAMILY HISTORY:  Family history of diabetes mellitus in mother    FH: dementia  mother    FH: HTN (hypertension)  mother    Family history of stomach cancer  sister    Family history of oral cancer  sister    Family history of depression  mother    MEDICATIONS   MEDICATIONS  (STANDING):  atorvastatin 40 milliGRAM(s) Oral at bedtime  calcium carbonate 1250 mG  + Vitamin D (OsCal 500 + D) 1 Tablet(s) Oral daily  carbidopa/levodopa  25/100 1.5 Tablet(s) Oral three times a day  carbidopa/levodopa CR 25/100 1 Tablet(s) Oral at bedtime  dextrose 5% + sodium chloride 0.45%. 1000 milliLiter(s) (75 mL/Hr) IV Continuous <Continuous>  dextrose 5%. 1000 milliLiter(s) (50 mL/Hr) IV Continuous <Continuous>  dextrose 50% Injectable 25 Gram(s) IV Push once  fenofibrate Tablet 145 milliGRAM(s) Oral daily  ferrous    sulfate Liquid 220 milliGRAM(s) Oral daily  folic acid 1 milliGRAM(s) Oral daily  glucagon  Injectable 1 milliGRAM(s) IntraMuscular once  insulin glargine Injectable (LANTUS) 10 Unit(s) SubCutaneous at bedtime  insulin lispro (ADMELOG) corrective regimen sliding scale   SubCutaneous three times a day before meals  metoprolol succinate ER 50 milliGRAM(s) Oral daily  mirtazapine 7.5 milliGRAM(s) Oral daily  pantoprazole    Tablet 40 milliGRAM(s) Oral before breakfast  polyethylene glycol 3350 17 Gram(s) Oral daily  predniSONE   Tablet 20 milliGRAM(s) Oral daily  senna 2 Tablet(s) Oral at bedtime  silver sulfADIAZINE 1% Cream 1 Application(s) Topical two times a day    MEDICATIONS  (PRN):  acetaminophen     Tablet .. 650 milliGRAM(s) Oral every 6 hours PRN Temp greater or equal to 38C (100.4F), Mild Pain (1 - 3)  dextrose Oral Gel 15 Gram(s) Oral once PRN Blood Glucose LESS THAN 70 milliGRAM(s)/deciliter    ALLERGIES/INTOLERANCES:  Allergies  No Known Allergies    Intolerances    VITALS & EXAMINATION:  Vital Signs Last 24 Hrs  T(C): 37.1 (2024 08:35), Max: 37.1 (2024 08:35)  T(F): 98.7 (2024 08:35), Max: 98.7 (2024 08:35)  HR: 88 (2024 16:45) (86 - 102)  BP: 138/77 (2024 16:45) (109/65 - 144/67)  BP(mean): --  RR: 16 (2024 16:45) (14 - 18)  SpO2: 98% (2024 16:45) (95% - 100%)    Parameters below as of 2024 16:45  Patient On (Oxygen Delivery Method): room air    General:  Constitutional: Female, appears stated age, eyes closed, mouth open, does not appear in pain   Head: Normocephalic;   Eyes: mildly reddened sclera;   Resp: breathing comfortably  Neck: no nuchal rigidity     Neurological (>12):  MS: Eyes closed, to verbal stimuli briefly opens it but doesnt really track speaker. Not able to obtain orientation given non-verbal. Briefly able to follow some simple one step commands (raise arms). Appears delirious.  Language: Speech is absent   CNs: Pupils round hazy, minimally to nonreactive b/l. Repeatedly blinks and difficult to determine if blinks to confrontation in VF. EOMI but not tracking to end gaze all directions. No disconjugate gaze. No ivonne facial asymmetry b/l. Tongue midline.     Motor - Reduced bulk and reduced tone throughout. Moves upper extremities b/l antigravity 4+ proximally and 4 distally. Does not raise either leg antigravity but able to move slightly in plane of bed. Appears symmetric upper and lower extremities. Not following for manual motor testing.      Sensation: Intact to noxious stimuli b/l   Reflexes L/R:  Biceps(C5) 2/2  BR(C6) 1/1   Triceps(C7)  2/2 Patellar(L4)   1/1   Ankles 0/0   Toes: downgoing b/l  No ankle clonus  Neg ortiz reflex b/l  Coordination/ Gait: cannot assess given clinical status    LABORATORY:  CBC                       11.7   15.45 )-----------( 231      ( 2024 03:22 )             35.1     Chem     150<H>  |  113<H>  |  25<H>  ----------------------------<  190<H>  3.0<L>   |  23  |  0.41<L>    Ca    8.7      2024 10:12    TPro  6.3  /  Alb  3.6  /  TBili  0.5  /  DBili  x   /  AST  20  /  ALT  <5  /  AlkPhos  55  -30    LFTs LIVER FUNCTIONS - ( 2024 03:22 )  Alb: 3.6 g/dL / Pro: 6.3 g/dL / ALK PHOS: 55 U/L / ALT: <5 U/L / AST: 20 U/L / GGT: x           Coagulopathy PT/INR - ( 2024 03:22 )   PT: 10.3 sec;   INR: 0.92 ratio         PTT - ( 2024 03:22 )  PTT:24.8 sec  Lipid Panel   A1c   Cardiac enzymes CARDIAC MARKERS ( 2024 03:22 )  x     / x     / 92 U/L / x     / 26.1 ng/mL      U/A Urinalysis Basic - ( 2024 10:12 )    Color: x / Appearance: x / SG: x / pH: x  Gluc: 190 mg/dL / Ketone: x  / Bili: x / Urobili: x   Blood: x / Protein: x / Nitrite: x   Leuk Esterase: x / RBC: x / WBC x   Sq Epi: x / Non Sq Epi: x / Bacteria: x      CSF  Other    STUDIES & IMAGING: (EEG, CT, MR, U/S, TTE/MARIANO):    < from: CT Head No Cont (24 @ 06:23) >    ACC: [id number] EXAM:  CT BRAIN   ORDERED BY: JASON CALDERON     PROCEDURE DATE:  2024          INTERPRETATION:  CLINICAL INFORMATION: Altered mental status..    TECHNIQUE: Serial axial images were obtained from the skull base to the   vertex without intravenous contrast.  Coronal and sagittal reformations   were obtained.    COMPARISON: No similar prior studies available for comparison.    FINDINGS:    There is no acute intracranial hemorrhage or mass effect.    Mild hydrocephalus involving the lateral and third ventricle with overall   normal appearing fourth ventricle. Callosal angle at the level the   anterior commissure is 76 degrees, suspicious for normal pressure   hydrocephalus.    Gray-white matter differentiation is preserved. Minimal areas of white   matter hypodensity are seen likely representing microvascular-type   changes.    The calvarium is intact.    The visualized portions of the paranasal sinuses and mastoid air cells   are clear.    IMPRESSION:    No acute intracranial hemorrhage.    Mild hydrocephalus involving the lateral and third ventricle with overall   normal-appearing fourth ventricle. Narrowed callosal angle at the level   the anterior commissure suspicious for normal pressure hydrocephalus.    --- End of Report ---    CHANCE BRIAN MD; Resident Radiologist  This document has been electronically signed.  BARB MCFARLAND MD; Attending Radiologist  This document has been electronically signed. 2024  6:44AM    < end of copied text >     Neurology Consultation     History from daughter bedside who assisted in [place] translation - 24. Margareth.     HPI:   Initial reason for consult: NPH eval: Patient Nadja Ramirez is a 80 yo F PMH IDDM, HTN, HLD, OP, Parkinson's, GCA, peripheral neuropathy, diabetic retinopathy w/ reduced vision, BIBEMS from [facility]. Per daughter, patient has been at [facility]. She has been having poor PO intake, less interactive, less verbal and less ambulatory. Patient was found on the floor after a fall. No HS or LOC. Of note, patient has had multiple falls recently. Per primary team who obtained hx from daughter, patient is more interactive than baseline. In the ED, CT A/P showed acute emphysematous cystitis. Found with WBC 15.4, troponin 70->54, Elevated FSG. CK92. Na 150, K 3.0, positive UA. Patient was treated with CTX, Zosyn, and 1L NS in the ED.  Neuro consulted for CTH concerning for showed hydrocephalus of lateral and third ventricles and narrowed callosal angle suspicious for normal pressure hydrocephalus.      *UPDATE: Neurology night team was notified regarding CTA findings of head/neck.     Patient was seen again on 24 with daughter Margareth () present. She elaborated on history that was obtained. She stated that patient awhile back was found on floor endorsing difficulty seeing bilaterally. She was sent to outside hospital and after workup was transferred to [facility]. The LKW per daughter was 24 on patient's birthday when family went to celebrate. At that time patient was able to walk independently, but generally weak. Talking, eating regularly and making jokes. Then patient's other daughter Jennifer would call and check in daily after that. They noted a progressive decline in her where she was less interactive, decreased appetite, speaking less. This was notable last Saturday per Margareth by the other daughter Jennifer 24. Then they went to see her and saw her with concerns of failure to thrive. Labwork was performed and resulting abnormalities caused her to be transferred to [facility] for further workup. Here UA positive, CT A/P concerning for acute emphysematous cystitis. Neuro consulted for NPH initially. Then overnight CT angio head and neck performed with findings below. Today when re-evaluated daughter states patient is improving, more interactive, speaking few words though hypophonic. Moving extremities more and following more commands. Still not back to baseline however but some improvement from prior day.     PAST MEDICAL & SURGICAL HISTORY:  HTN (hypertension)    HLD (hyperlipidemia)    DM (diabetes mellitus)    CL (cholelithiasis)    Osteoporosis    Diabetic neuropathy    Diabetes    H/O Parkinson's disease    Hypertension    Giant cell arteritis    H/O left inguinal hernia repair    History of  section    FAMILY HISTORY:  Family history of diabetes mellitus in mother    FH: dementia  mother    FH: HTN (hypertension)  mother    Family history of stomach cancer  sister    Family history of oral cancer  sister    Family history of depression  mother    MEDICATIONS   MEDICATIONS  (STANDING):  atorvastatin 40 milliGRAM(s) Oral at bedtime  calcium carbonate 1250 mG  + Vitamin D (OsCal 500 + D) 1 Tablet(s) Oral daily  carbidopa/levodopa  25/100 1.5 Tablet(s) Oral three times a day  carbidopa/levodopa CR 25/100 1 Tablet(s) Oral at bedtime  dextrose 5% + sodium chloride 0.45%. 1000 milliLiter(s) (75 mL/Hr) IV Continuous <Continuous>  dextrose 5%. 1000 milliLiter(s) (50 mL/Hr) IV Continuous <Continuous>  dextrose 50% Injectable 25 Gram(s) IV Push once  fenofibrate Tablet 145 milliGRAM(s) Oral daily  ferrous    sulfate Liquid 220 milliGRAM(s) Oral daily  folic acid 1 milliGRAM(s) Oral daily  glucagon  Injectable 1 milliGRAM(s) IntraMuscular once  insulin glargine Injectable (LANTUS) 10 Unit(s) SubCutaneous at bedtime  insulin lispro (ADMELOG) corrective regimen sliding scale   SubCutaneous three times a day before meals  metoprolol succinate ER 50 milliGRAM(s) Oral daily  mirtazapine 7.5 milliGRAM(s) Oral daily  pantoprazole    Tablet 40 milliGRAM(s) Oral before breakfast  polyethylene glycol 3350 17 Gram(s) Oral daily  predniSONE   Tablet 20 milliGRAM(s) Oral daily  senna 2 Tablet(s) Oral at bedtime  silver sulfADIAZINE 1% Cream 1 Application(s) Topical two times a day    MEDICATIONS  (PRN):  acetaminophen     Tablet .. 650 milliGRAM(s) Oral every 6 hours PRN Temp greater or equal to 38C (100.4F), Mild Pain (1 - 3)  dextrose Oral Gel 15 Gram(s) Oral once PRN Blood Glucose LESS THAN 70 milliGRAM(s)/deciliter    ALLERGIES/INTOLERANCES:  Allergies  No Known Allergies    Intolerances    VITALS & EXAMINATION:  Vital Signs Last 24 Hrs  T(C): 37.1 (2024 08:35), Max: 37.1 (2024 08:35)  T(F): 98.7 (2024 08:35), Max: 98.7 (2024 08:35)  HR: 88 (2024 16:45) (86 - 102)  BP: 138/77 (2024 16:45) (109/65 - 144/67)  BP(mean): --  RR: 16 (2024 16:45) (14 - 18)  SpO2: 98% (2024 16:45) (95% - 100%)    Parameters below as of 2024 16:45  Patient On (Oxygen Delivery Method): room air    Eval on 24:  General:  Constitutional: Female, appears stated age, eyes closed, mouth open, does not appear in pain   Head: Normocephalic;   Eyes: mildly reddened sclera;   Resp: breathing comfortably  Neck: no nuchal rigidity     Neurological (>12):  MS: Eyes closed, to verbal stimuli briefly opens it but doesnt really track speaker. Not able to obtain orientation given non-verbal. Briefly able to follow some simple one step commands (raise arms). Appears delirious.  Language: Speech is absent   CNs: Pupils round hazy, minimally to nonreactive b/l. Repeatedly blinks and difficult to determine if blinks to confrontation in VF. EOMI but not tracking to end gaze all directions. No disconjugate gaze. No ivonne facial asymmetry b/l. Tongue midline.     Motor - Reduced bulk and reduced tone throughout. Moves upper extremities b/l antigravity 4+ proximally and 4 distally. Does not raise either leg antigravity but able to move slightly in plane of bed. Appears symmetric upper and lower extremities. Not following for manual motor testing.      Sensation: Intact to noxious stimuli b/l   Reflexes L/R:  Biceps(C5) 2/2  BR(C6) 1/1   Triceps(C7)  2/2 Patellar(L4)   1/1   Ankles 0/0   Toes: downgoing b/l  No ankle clonus  Neg ortiz reflex b/l  Coordination/ Gait: cannot assess given clinical status    Eval on 24:   General:  Constitutional: Female, appears stated age, eyes closed, mouth open, does not appear in pain   Head: Normocephalic;   Eyes: clear sclera;   Resp: breathing comfortably  Neck: no nuchal rigidity  Fundoscopic exam: unable to visualize due to opaque lens b/l     Neurological (>12):  MS: Eyes closed, to verbal stimuli opens it but doesnt really track speaker. Was more oriented now. Able to state correctly her age, hospital, year, month. Able to show two fingers, look towards left and right when asked. Harder for her for complex cross commands.    Language: Speech is hypophonic, speaks few words at a time. Able to repeat brief statements.   CNs: Pupils round opaque, nonreactive b/l. Does not readily blink to threat b/l eyes. Unable to count fingers b/l eyes regardless of quadrants. EOMI. No disconjugate gaze. Slight L nasolabial fold flattening. Tongue midline and can protrude.     Motor - Reduced bulk and reduced tone throughout. Moves upper extremities b/l antigravity 4+ proximally (shoulder) and 4+ distally (). Not following for biceps/triceps exam. Does not raise either leg antigravity but able to move slightly in plane of bed equally. Appears symmetric upper and lower extremities.     Sensation: Intact to noxious stimuli b/l x4 extremities  Reflexes L/R:  Biceps(C5) 2/2  BR(C6) 1/1   Triceps(C7)  2/2 Patellar(L4)   1/1   Ankles 0/0   Toes: downgoing b/l  No ankle clonus  Neg ortiz reflex b/l  Coordination/ Gait: cannot assess given clinical status     LABORATORY:  CBC                       11.7   15.45 )-----------( 231      ( 2024 03:22 )             35.1     Chem 01-30    150<H>  |  113<H>  |  25<H>  ----------------------------<  190<H>  3.0<L>   |  23  |  0.41<L>    Ca    8.7      2024 10:12    TPro  6.3  /  Alb  3.6  /  TBili  0.5  /  DBili  x   /  AST  20  /  ALT  <5  /  AlkPhos  55      LFTs LIVER FUNCTIONS - ( 2024 03:22 )  Alb: 3.6 g/dL / Pro: 6.3 g/dL / ALK PHOS: 55 U/L / ALT: <5 U/L / AST: 20 U/L / GGT: x           Coagulopathy PT/INR - ( 2024 03:22 )   PT: 10.3 sec;   INR: 0.92 ratio         PTT - ( 2024 03:22 )  PTT:24.8 sec  Lipid Panel   A1c   Cardiac enzymes CARDIAC MARKERS ( 2024 03:22 )  x     / x     / 92 U/L / x     / 26.1 ng/mL    U/A Urinalysis Basic - ( 2024 10:12 )    Color: x / Appearance: x / SG: x / pH: x  Gluc: 190 mg/dL / Ketone: x  / Bili: x / Urobili: x   Blood: x / Protein: x / Nitrite: x   Leuk Esterase: x / RBC: x / WBC x   Sq Epi: x / Non Sq Epi: x / Bacteria: x      CSF  Other    STUDIES & IMAGING: (EEG, CT, MR, U/S, TTE/MARIANO):    < from: CT Head No Cont (24 @ 06:23) >    ACC: [id number] EXAM:  CT BRAIN   ORDERED BY: JASON CALDERON     PROCEDURE DATE:  2024          INTERPRETATION:  CLINICAL INFORMATION: Altered mental status..    TECHNIQUE: Serial axial images were obtained from the skull base to the   vertex without intravenous contrast.  Coronal and sagittal reformations   were obtained.    COMPARISON: No similar prior studies available for comparison.    FINDINGS:    There is no acute intracranial hemorrhage or mass effect.    Mild hydrocephalus involving the lateral and third ventricle with overall   normal appearing fourth ventricle. Callosal angle at the level the   anterior commissure is 76 degrees, suspicious for normal pressure   hydrocephalus.    Gray-white matter differentiation is preserved. Minimal areas of white   matter hypodensity are seen likely representing microvascular-type   changes.    The calvarium is intact.    The visualized portions of the paranasal sinuses and mastoid air cells   are clear.    IMPRESSION:    No acute intracranial hemorrhage.    Mild hydrocephalus involving the lateral and third ventricle with overall   normal-appearing fourth ventricle. Narrowed callosal angle at the level   the anterior commissure suspicious for normal pressure hydrocephalus.    --- End of Report ---    CHANCE BRIAN MD; Resident Radiologist  This document has been electronically signed.  BARB MCFARLAND MD; Attending Radiologist  This document has been electronically signed. 2024  6:44AM    < end of copied text >    < from: CT Angio Neck w/ IV Cont (24 @ 20:02) >    ACC: [id number] EXAM:  CT ANGIO NECK (W)AW IC   ORDERED BY: RONNY LOPEZ     ACC: [id number] EXAM:  CT ANGIO BRAIN (W)AW IC   ORDERED BY: RONNY LOPEZ     PROCEDURE DATE:  2024          INTERPRETATION:  Two examinations were performed on this patient:  1. CT Angiography of the carotid arteries with IV contrast (and without,   if performed)  2. CT Angiography of the intracranial circulation with IV contrast (and   without, if performed)    CLINICAL INFORMATION:    CVA/STROKE   AMS  LCT  Admitting Dxs: N30.00   N30.00  found unresponsive    TECHNIQUE (both examinations):   Initial noncontrast CT was obtained to   the head.  CT angiography images at 1 mm thickness were acquired from the   skull base to the skull vertex as a single helical acquisition.   Images   were acquired during rapid bolus intravenous administration.  This data   set was reconstructed sagittal and coronal images.  3D MIP reconstruction   images were obtained.    Post processing angiographic reconstruction of   images was performed. This data set was  reconstructed  and reviewed in   multiple projections to evaluate carotid morphology and intracranial   vessels.   The magnitude of stenosis was evaluated based on the diameter   of an intact distal of the internal carotid artery using NASCET criteria.   Images were repeated through the head following contrast administration.  CONTRAST:    90 cc administered  ;  10 cc discarded  DOSE INFORMATION:   This scan was performed using automatic exposure   control (radiation dose reduction software) to obtain a diagnostic image   quality scan with patient dose as low as reasonably achievable.   Total   DLP for this examination is estimated at 1767 mGy-cm.    COMPARISON:    CT head earlier same date    FINDINGS:    CAROTID CIRCULATION    The thoracic aortic arch appears intact.  The great vessel origins remain   patent.    The right carotid circulation demonstrates an intact common carotid   artery.  The carotid bulb demonstrate mild calcified and noncalcified   atherosclerotic plaque along its medial wall without hemodynamically   significant stenosis.  The internal carotid is patent to the skull base.    The left carotid circulation demonstrates an intact common carotid   artery.  The carotid bulb demonstrates mild calcified and noncalcified   atherosclerotic plaque along its posterior wall without hemodynamically   significant stenosis.  The internal carotid is patent to the skull base.    The vertebral arteries are patent.  The degree of vertebral asymmetry is   within physiologic limits. The dominant caliber left vertebral artery   demonstrates mild luminal narrowing in its preforaminal  V1 segment at   the C7-T1 level, less than 50%. The small caliber left vertebral artery   demonstrates segmental low-grade stenosis and fusiform aneurysmal   dilatation in its distal foraminal P2 segment, with narrowing greatest at   the C3 level in the range of 50%.    INTRACRANIAL CIRCULATION    The ANTERIOR circulation demonstrates intact inflow from theascending   cervical segment to the petrous segment of each internal carotid artery.   The cavernous and clinoid segments demonstrate calcified and noncalcified   atherosclerotic plaque. This causes luminal irregularity and stenoses   that appear greatest in the clinoid segment on the right but remain less   than 50%.   The ophthalmic arteries are demonstrated as patent vessels on   each side.    The anterior cerebral arteries are patent and asymmetric,   the dominant caliber A1 segment on the left, smaller caliber on the   right.  An anterior communicating artery is present. The anterior   cerebral arterial A2 segments are patent to peripheral branching. The   right middle cerebral artery demonstrates an intact initial M1 segment   and patent peripheral anterior and posterior division sylvian branches.    The left middle cerebral artery demonstrates an intact initial M1 segment   and patent peripheral anterior and posterior division sylvian branches.    The POSTERIOR circulation demonstrates patent inflow from each vertebral   artery.   The smaller caliber right vertebral artery attenuates at the   level of the dural crossing. It has irregular stenoses up to its PICA   origin, only minimally intermittently seen distal to this location to the   vascular formation. The larger caliber left vertebral artery demonstrates   segmental narrowing up to 50% in its extracranial segment.    PICA   arteries are patent on each side.  The basilar artery is severely   attenuated throughout its middle segment with multilevel focal areas of   nonvisualization. Improved flow is noted at its most distal aspect,   likely reversed. Superior cerebellar arteries are demonstrated, likely   perfused by a reversed flow.  There is fetal origin of each posterior   cerebral artery from the ipsilateral internal carotid artery (typically   incidental developmental variant). Small caliber P1 segment is seen on   the right, not convincingly demonstrated on the left   Each posterior   cerebral artery is patent to peripheral branching.    The principal dural venous sinuses are patent.  This includes the   superior sagittal sinus anterior and posterior limbs.  Each transverse   sinus is patent to sigmoid sinuses and patent jugular veins.    No intracranial aneurysm or vascular malformation is identified.    BRAIN:   No hemorrhage mass or infarction is identified. The brain again   demonstrate mild diminished attenuation in the deep cerebral hemispheric   white matter that suggest ischemic white matter disease and could include   transependymal resorption of CSF. CSF spaces remain dilated with   differential dilatation of the lateral and third ventricles with respect   to the sulci and fourth ventricle. The cerebral aqueduct appears patent.   Following contrast administration no abnormal brain parenchymal   enhancement is found.    ADDITIONAL FINDINGS:  None.      IMPRESSION:    1.   Right carotid system:    Atherosclerotic plaque carotid bulb without    hemodynamically significant stenosis.    2.   Left carotid system:     Atherosclerotic plaque carotid bulb without    hemodynamically significant stenosis.    3.   Vertebral circulation:    Patent. Segmental narrowings and fusiform   aneurysmal dilatation right vertebral artery distal foraminal V2 segment,   nonspecific, possibly atherosclerotic. Low-grade narrowing proximal   preforaminal V1 segment left vertebral artery is nonspecific, possibly   atherosclerotic.    4.  Anterior intracranial circulation:     Intracranial atherosclerosis   cavernous and clinoid segments of the internal carotid arteries, at least   mild to moderate on the right, lower greater on the left.    5.  Posterior intracranial circulation:    Severe vertebral basilar   attenuation reflects both developmentally small caliber and superimposed   high-grade multifocal stenoses, likely atherosclerotic, involving each   vertebral artery and the basilar artery. Likely reversal of flow in the   distal basilar artery to perfuse the superior cerebellar arteries    6.  Brain:   No infarct is identified.      Preliminary report provided. At the time of preliminary interpretation   d/w David BAILEY @ 505am on 24 with readback confirmation    --- End of Report ---    NATANAEL WALTERS MD; Attending Radiologist  This document has been electronically signed. 2024  9:45AM    < end of copied text >

## 2024-01-30 NOTE — ED ADULT NURSE REASSESSMENT NOTE - NS ED NURSE REASSESS COMMENT FT1
as per provider order, pt straight cath completed, bloody urine with minute amounts of clot noted. UA/UC sent to lab. 14Fr Valles placed. Pending repeat EKG. as per provider order, pt straight cath completed, cottage cheese like discharge noted around the labia area of the pt genital. Upon insertion of catheter pus and  bloody urine with minute amounts of clot noted. UA/UC sent to lab. 14Fr Valles placed. Pending repeat EKG.

## 2024-01-30 NOTE — CONSULT NOTE ADULT - ATTENDING COMMENTS
mild troponin elevation, unlikely ACS and more likely due to underlying medical issues  given functional status not a candidate for invasive evaluation and would defer ischemic eval

## 2024-01-30 NOTE — CONSULT NOTE ADULT - SUBJECTIVE AND OBJECTIVE BOX
Cardiology Consult Note   [Please check [email or web address] password: [email or web address]" for cardiology service schedule and contact information]    Admission HPI:  78 yo F PMH IDDM, HTN, HLD, OP, Parkinson's, [facility] BIBEMS from [facility]. Per documentation, patient was found on the floor after a fall. No HS or LOC. Attempted to contacted [facility] to obtain additional collateral. Of note, patient has had multiple falls recently. Patient is A&Ox0 so history obtained as per daughter Bina. Per daughter, patient is A&Ox3 at baseline and conversant. In the ED, patient is hemodynamically stable. CTH was negative for bleed or infarct but showed hydrocephalus of lateral and third ventricles and narrowed callosal angle suspicious for normal pressure hydrocephalus. CT A/P showed acute emphysematous cystitis. Fluid in the vagina with marked vaginal mucosal enhancement and questionable locules of luminal air inseparable from the adjacent emphysematous bladder wall. Cannot exclude vesicovaginal fistula. Labs significant for Na 150, K 3.0, WBC 15.4, troponin 70->54, ->147. UA in the ED +nitrites, moderate leukocyte esterase +protein, +ketones 4k WBC, 1.2k RBC, +glucose. Patient was treated with CTX, Zosyn, and 1L NS in the ED. (2024 16:38)    Cardiology consulted for elevated trop and abnormal ECG.   Cardiac enzymes significant for Trop 70-54, CKMB 26.1  ECG with ***  Other labs with significant abnormalities including WBC 15.5, Na 150, K 3, grossly abnormal UA.  CT A/P with concern for possible vesicovaginal fistula.  Patient unable to provide history or complete ROS.  No previous ECG or cardiac testing available for review.    PAST MEDICAL & SURGICAL HISTORY:  HTN (hypertension)      HLD (hyperlipidemia)      DM (diabetes mellitus)      CL (cholelithiasis)      Osteoporosis      Diabetic neuropathy      Diabetes      H/O Parkinson's disease      Hypertension      Giant cell arteritis      H/O left inguinal hernia repair      History of  section        FAMILY HISTORY:  Family history of diabetes mellitus in mother    FH: dementia  mother    FH: HTN (hypertension)  mother    Family history of stomach cancer  sister    Family history of oral cancer  sister    Family history of depression  mother      SOCIAL HISTORY:  unchanged    MEDICATIONS:  enoxaparin Injectable 40 milliGRAM(s) SubCutaneous every 24 hours  metoprolol succinate ER 50 milliGRAM(s) Oral daily        acetaminophen     Tablet .. 650 milliGRAM(s) Oral every 6 hours PRN  carbidopa/levodopa  25/100 1.5 Tablet(s) Oral three times a day  carbidopa/levodopa CR 25/100 1 Tablet(s) Oral at bedtime  mirtazapine 7.5 milliGRAM(s) Oral daily    pantoprazole    Tablet 40 milliGRAM(s) Oral before breakfast  polyethylene glycol 3350 17 Gram(s) Oral daily  senna 2 Tablet(s) Oral at bedtime    atorvastatin 40 milliGRAM(s) Oral at bedtime  dextrose 50% Injectable 25 Gram(s) IV Push once  dextrose Oral Gel 15 Gram(s) Oral once PRN  fenofibrate Tablet 145 milliGRAM(s) Oral daily  glucagon  Injectable 1 milliGRAM(s) IntraMuscular once  insulin glargine Injectable (LANTUS) 10 Unit(s) SubCutaneous at bedtime  insulin lispro (ADMELOG) corrective regimen sliding scale   SubCutaneous three times a day before meals  predniSONE   Tablet 20 milliGRAM(s) Oral daily    calcium carbonate 1250 mG  + Vitamin D (OsCal 500 + D) 1 Tablet(s) Oral daily  dextrose 5% + sodium chloride 0.45%. 1000 milliLiter(s) IV Continuous <Continuous>  dextrose 5%. 1000 milliLiter(s) IV Continuous <Continuous>  ferrous    sulfate Liquid 220 milliGRAM(s) Oral daily  folic acid 1 milliGRAM(s) Oral daily  silver sulfADIAZINE 1% Cream 1 Application(s) Topical two times a day      REVIEW OF SYSTEMS:  Unable to assess  -------------------------------------------------------------------------------------------  PHYSICAL EXAM:  T(C): 37.1 (24 @ 08:35), Max: 37.1 (24 @ 08:35)  HR: 88 (24 @ 16:45) (86 - 102)  BP: 138/77 (24 @ 16:45) (109/65 - 144/67)  RR: 16 (24 @ 16:45) (14 - 18)  SpO2: 98% (24 @ 16:45) (95% - 100%)  Wt(kg): --  I&O's Summary      GENERAL: NAD  HEAD: Atraumatic, Normocephalic.  EYES: EOMI, PERRLA, conjunctiva and sclera clear.  ENT: Moist mucous membranes.  NECK: Supple, No JVD.  CHEST/LUNG: Clear to auscultation bilaterally; No rales, rhonchi, wheezing, or rubs. Unlabored respirations.  HEART: Regular rate and rhythm; No murmurs, rubs, or gallops.  ABDOMEN: Bowel sounds present; Soft, Nontender, Nondistended.   EXTREMITIES:  2+ Peripheral Pulses, brisk capillary refill. No clubbing, cyanosis, or edema.  PSYCH: Normal affect.  SKIN: No rashes or lesions.    -------------------------------------------------------------------------------------------  LABS:                          11.7   15.45 )-----------( 231      ( 2024 03:22 )             35.1         150<H>  |  113<H>  |  25<H>  ----------------------------<  190<H>  3.0<L>   |  23  |  0.41<L>    Ca    8.7      2024 10:12    TPro  6.3  /  Alb  3.6  /  TBili  0.5  /  DBili  x   /  AST  20  /  ALT  <5  /  AlkPhos  55      PT/INR - ( 2024 03:22 )   PT: 10.3 sec;   INR: 0.92 ratio         PTT - ( 2024 03:22 )  PTT:24.8 sec  CARDIAC MARKERS ( 2024 05:47 )  54 ng/L / x     / x     / x     / x     / x      CARDIAC MARKERS ( 2024 03:22 )  70 ng/L / x     / x     / 92 U/L / x     / 26.1 ng/mL          acetaminophen     Tablet .. 650 milliGRAM(s) Oral every 6 hours PRN  carbidopa/levodopa  25/100 1.5 Tablet(s) Oral three times a day  carbidopa/levodopa CR 25/100 1 Tablet(s) Oral at bedtime  mirtazapine 7.5 milliGRAM(s) Oral daily       Cardiology Consult Note   [Please check [email or web address] password: "[email or web address]" for cardiology service schedule and contact information]    Admission HPI:  80 yo F PMH IDDM, HTN, HLD, OP, Parkinson's, [facility] BIBEMS from [facility] [place]. Per documentation, patient was found on the floor after a fall. No HS or LOC. Attempted to contacted [facility] to obtain additional collateral. Of note, patient has had multiple falls recently. Patient is A&Ox0 so history obtained as per daughter Bina. Per daughter, patient is A&Ox3 at baseline and conversant. In the ED, patient is hemodynamically stable. CTH was negative for bleed or infarct but showed hydrocephalus of lateral and third ventricles and narrowed callosal angle suspicious for normal pressure hydrocephalus. CT A/P showed acute emphysematous cystitis. Fluid in the vagina with marked vaginal mucosal enhancement and questionable locules of luminal air inseparable from the adjacent emphysematous bladder wall. Cannot exclude vesicovaginal fistula. Labs significant for Na 150, K 3.0, WBC 15.4, troponin 70->54, ->147. UA in the ED +nitrites, moderate leukocyte esterase +protein, +ketones 4k WBC, 1.2k RBC, +glucose. Patient was treated with CTX, Zosyn, and 1L NS in the ED. (2024 16:38)    Cardiology consulted for elevated trop and abnormal ECG.   Cardiac enzymes significant for Trop 70-54, CKMB 26.1  ECG with NSR, nonspecific TWI in inferior and anterolateral leads  Other labs with significant abnormalities including WBC 15.5, Na 150, K 3, grossly abnormal UA.  CT A/P with concern for possible vesicovaginal fistula.  Patient unable to provide history or complete ROS.  No previous ECG or cardiac testing available for review.    Per family at bedside, patient does not have any known cardiac history.  No family hx of cardiac disease.  No echo or stress test performed in past.  Has been fatigued/not eating much recently but no CP/SOB/diaphoresis/palpitations.    PAST MEDICAL & SURGICAL HISTORY:  HTN (hypertension)      HLD (hyperlipidemia)      DM (diabetes mellitus)      CL (cholelithiasis)      Osteoporosis      Diabetic neuropathy      Diabetes      H/O Parkinson's disease      Hypertension      Giant cell arteritis      H/O left inguinal hernia repair      History of  section        FAMILY HISTORY:  Family history of diabetes mellitus in mother    FH: dementia  mother    FH: HTN (hypertension)  mother    Family history of stomach cancer  sister    Family history of oral cancer  sister    Family history of depression  mother      SOCIAL HISTORY:  unchanged    MEDICATIONS:  enoxaparin Injectable 40 milliGRAM(s) SubCutaneous every 24 hours  metoprolol succinate ER 50 milliGRAM(s) Oral daily        acetaminophen     Tablet .. 650 milliGRAM(s) Oral every 6 hours PRN  carbidopa/levodopa  25/100 1.5 Tablet(s) Oral three times a day  carbidopa/levodopa CR 25/100 1 Tablet(s) Oral at bedtime  mirtazapine 7.5 milliGRAM(s) Oral daily    pantoprazole    Tablet 40 milliGRAM(s) Oral before breakfast  polyethylene glycol 3350 17 Gram(s) Oral daily  senna 2 Tablet(s) Oral at bedtime    atorvastatin 40 milliGRAM(s) Oral at bedtime  dextrose 50% Injectable 25 Gram(s) IV Push once  dextrose Oral Gel 15 Gram(s) Oral once PRN  fenofibrate Tablet 145 milliGRAM(s) Oral daily  glucagon  Injectable 1 milliGRAM(s) IntraMuscular once  insulin glargine Injectable (LANTUS) 10 Unit(s) SubCutaneous at bedtime  insulin lispro (ADMELOG) corrective regimen sliding scale   SubCutaneous three times a day before meals  predniSONE   Tablet 20 milliGRAM(s) Oral daily    calcium carbonate 1250 mG  + Vitamin D (OsCal 500 + D) 1 Tablet(s) Oral daily  dextrose 5% + sodium chloride 0.45%. 1000 milliLiter(s) IV Continuous <Continuous>  dextrose 5%. 1000 milliLiter(s) IV Continuous <Continuous>  ferrous    sulfate Liquid 220 milliGRAM(s) Oral daily  folic acid 1 milliGRAM(s) Oral daily  silver sulfADIAZINE 1% Cream 1 Application(s) Topical two times a day      REVIEW OF SYSTEMS:  Unable to assess  -------------------------------------------------------------------------------------------  PHYSICAL EXAM:  T(C): 37.1 (24 @ 08:35), Max: 37.1 (24 @ 08:35)  HR: 88 (24 @ 16:45) (86 - 102)  BP: 138/77 (24 @ 16:45) (109/65 - 144/67)  RR: 16 (24 @ 16:45) (14 - 18)  SpO2: 98% (24 @ 16:45) (95% - 100%)  Wt(kg): --  I&O's Summary      GENERAL: AAO x 0. Able to move limbs spontaneously and withdraw to painful stimuli. Does not respond to commands.  HEAD: Atraumatic, Normocephalic.  EYES: EOMI, PERRLA, conjunctiva and sclera clear.  ENT: Moist mucous membranes.  NECK: Prominent pulsation, likely arterial.  CHEST/LUNG: Clear to auscultation bilaterally; No rales, rhonchi, wheezing, or rubs. Unlabored respirations.  HEART: Regular rate and rhythm; No murmurs, rubs, or gallops.  ABDOMEN: Bowel sounds present; Soft, Nontender, Nondistended.   EXTREMITIES:  2+ Peripheral Pulses, brisk capillary refill. No clubbing, cyanosis, or edema.    -------------------------------------------------------------------------------------------  LABS:                          11.7   15.45 )-----------( 231      ( 2024 03:22 )             35.1         150<H>  |  113<H>  |  25<H>  ----------------------------<  190<H>  3.0<L>   |  23  |  0.41<L>    Ca    8.7      2024 10:12    TPro  6.3  /  Alb  3.6  /  TBili  0.5  /  DBili  x   /  AST  20  /  ALT  <5  /  AlkPhos  55  01-    PT/INR - ( 2024 03:22 )   PT: 10.3 sec;   INR: 0.92 ratio         PTT - ( 2024 03:22 )  PTT:24.8 sec  CARDIAC MARKERS ( 2024 05:47 )  54 ng/L / x     / x     / x     / x     / x      CARDIAC MARKERS ( 2024 03:22 )  70 ng/L / x     / x     / 92 U/L / x     / 26.1 ng/mL          acetaminophen     Tablet .. 650 milliGRAM(s) Oral every 6 hours PRN  carbidopa/levodopa  25/100 1.5 Tablet(s) Oral three times a day  carbidopa/levodopa CR 25/100 1 Tablet(s) Oral at bedtime  mirtazapine 7.5 milliGRAM(s) Oral daily

## 2024-01-30 NOTE — H&P ADULT - PROBLEM SELECTOR PLAN 6
K 3.0  Patient is s/p KCl IV 10x3  Will order KCl 40mg x1  EKG NSR  Monitor BMP as above K 3.0  Patient is s/p KCl IV  EKG NSR  Monitor BMP as above, replete K>4.0

## 2024-01-30 NOTE — ED PROVIDER NOTE - ATTENDING CONTRIBUTION TO CARE
78 y/o F with h/o HTN, hyperlipidemia, DM, Parkinson's disease, giant cell arteritis BIB EMS from NH for hyperglycemia of 240.  Pt also noted to be not responsive as normal (here only grunting to name and withdraws to pain, per chart pt is A&Ox3 at baseline), but unclear how long she has had this change in mentation.  Pt is unable to provide any hx.    Chronically ill appearing, lying in stretcher, sleeping but will moan when you call her name and withdraws and moans with painful stimuli.  Afeb/VSS.  NCAT EOMI PERRL.  Lungs cta bl.  Cards nl S1/S2, RRR, no MRG.  Abd soft diffuse lower abd tenderness, no rebound or guarding.     Pt with AMS of unknown duration - consider metabolic vs primary cns vs infectious etiology.  While hyperglycemic here, not excessively so - will r/o dka.  Plan for EKG, CT head, labs, CXR, UA, admit.

## 2024-01-30 NOTE — H&P ADULT - PROBLEM SELECTOR PLAN 1
Patient had fall at [facility] found on the floor, attempted to contact facility for further history  CTH was negative for bleed or infarct but showed hydrocephalus of lateral and third ventricles and narrowed callosal angle suspicious for normal pressure hydrocephalus.   f/u Neuro consult, consulted IR for LP - contact procedure team in AM if unable to obtain overnight  f/u TTE  Maintain neuro checks  PT/OT  Fall precautions

## 2024-01-30 NOTE — ED ADULT NURSE REASSESSMENT NOTE - NS ED NURSE REASSESS COMMENT FT1
Pt noted to be in no acute distress. AO0, responsive to painful stimuli. NSR on the monitor. IV abx given as per eMAR. Pending imaging results.

## 2024-01-30 NOTE — H&P ADULT - PROBLEM SELECTOR PLAN 3
CT A/P with acute emphysematous cystitis. Fluid in the vagina with marked vaginal mucosal enhancement and questionable locules of luminal air inseparable from the adjacent emphysematous bladder wall. Cannot exclude vesicovaginal fistula.  Appreciate urology eval, no acute surgical intervention per urology

## 2024-01-30 NOTE — ED ADULT NURSE NOTE - OBJECTIVE STATEMENT
79 year old AO0, brought in by EMS from [facility] for hyperglycemia. As per triage RN, per EMS, pt. usually A&Ox4, per [facility], unknown last known normal. PMH DM type 2, HTN, dementia. Pt responsive to painful stimuli, able to open eyes, PERRLA. Pt unable to answer questions. left 22g placed in the AC, labs drawn. Pt NSR on the tele monitor. Rectal temperature completed, on inspection stage 1 to the sacrum noted, covered in gauze and tape. VS as charted. Pending workup. 79 year old AO0, brought in by EMS from [facility] for hyperglycemia. As per triage RN, per EMS, pt. usually A&Ox4, per [facility], unknown last known normal. PMH DM type 2, HTN, dementia. Pt responsive to painful stimuli, able to open eyes, PERRLA. Pt unable to answer questions. abd distention noted. left 22g placed in the AC, labs drawn. Pt NSR on the tele monitor. Rectal temperature completed, on inspection stage 1 to the sacrum noted, covered in gauze and tape. VS as charted. Pending workup.

## 2024-01-30 NOTE — H&P ADULT - HISTORY OF PRESENT ILLNESS
80 yo F PMH IDDM, HTN, HLD, OP, Parkinson's, [facility] BIBEMS from [facility]. Per documentation, patient was found on the floor after a fall. No HS or LOC. Attempted to contacted [facility] to obtain additional collateral. Of note, patient has had multiple falls recently. Patient is A&Ox0 so history obtained as per daughter Bina. Per daughter, patient is A&Ox3 at baseline and conversant. In the ED, patient is hemodynamically stable. CTH was negative for bleed or infarct but showed hydrocephalus of lateral and third ventricles and narrowed callosal angle suspicious for normal pressure hydrocephalus. CT A/P showed acute emphysematous cystitis. Fluid in the vagina with marked vaginal mucosal enhancement and questionable locules of luminal air inseparable from the adjacent emphysematous bladder wall. Cannot exclude vesicovaginal fistula. Labs significant gfor Na 150, K 3.0, WBC 15.4, troponin 70->54, ->147. UA in the ED +nitrites, moderate leukocyte esterase +protein, +ketones 4k WBC, 1.2k RBC, +glucose. Patient was treated with CTX, Zosyn, and 1L NS in the ED.

## 2024-01-30 NOTE — H&P ADULT - PROBLEM SELECTOR PLAN 5
Na 149->150  Likely 2/2 decreased PO intake  Will start D5 1/2 NS at 75 cc/hr  Monitor BMP q8h, would not decrease Na more than 6-8 mEq in 24 hours  Monitor BMP

## 2024-01-30 NOTE — H&P ADULT - PROBLEM SELECTOR PLAN 2
Patient is A&Ox0 on exam - will open eyes on command - otherwise not conversant  Per daughter at baseline patient is A&Ox3 and conversant  CTH as above, f/u neuro consult, plan for LP  f/u CTA H/N, MRI Brain  Management for electrolyte abnormalities as below  NPO pending S/S eval, maintain aspiration precautions  Neuro checks

## 2024-01-30 NOTE — H&P ADULT - PROBLEM SELECTOR PLAN 9
Patient with residual vision loss since last hospitalization at [facility]  Continue home prednisone, methotrexate

## 2024-01-30 NOTE — H&P ADULT - NSICDXPASTMEDICALHX_GEN_ALL_CORE_FT
PAST MEDICAL HISTORY:  CL (cholelithiasis)     Diabetes     Diabetic neuropathy     DM (diabetes mellitus)     Giant cell arteritis     H/O Parkinson's disease     HLD (hyperlipidemia)     HTN (hypertension)     Hypertension     Osteoporosis

## 2024-01-30 NOTE — ED PROVIDER NOTE - CLINICAL SUMMARY MEDICAL DECISION MAKING FREE TEXT BOX
79F w/ hx Diabetes, diabetic neuropathy, seasonal allergies, HTN, HLD, osteoporosis, Parkinson's, htn, GCA presenting with AMS. Pt unable to give much hx. Pt bibems from [facility], unknown LKN. Per paperwork, pt usually AOx3, not very verbal, follows commands. Now pt shakes head no to all questions, AOx0, localizes to pain. Moving BUE. Winces on abd palpation. AMS likely infectious vs. neuro, will get labs, CT.

## 2024-01-30 NOTE — ED ADULT NURSE NOTE - CHIEF COMPLAINT QUOTE
Patient brought in by EMS from [facility] for hyperglycemia. Pt. responsive to painful stimuli. Per EMS, pt. usually A&Ox4, per [facility], unknown last known normal. Pt. . PMH DM type 2, HTN, dementia.

## 2024-01-30 NOTE — H&P ADULT - PROBLEM SELECTOR PLAN 7
Troponin 70->54  EKG NSR TWI V3-V6, I, II, aVF c/f anterolateral and inferior ischemia  f/u cardiology consult

## 2024-01-30 NOTE — CONSULT NOTE ADULT - ASSESSMENT
Patient Nadja Ramirez is a 80 yo F PMH IDDM, HTN, HLD, OP, Parkinson's, GCA, peripheral neuropathy, diabetic retinopathy w/ reduced vision, BIBEMS from [facility]. Per daughter, patient has been at [facility]. She has been having poor PO intake, less interactive, less verbal and less ambulatory. Patient was found on the floor after a fall. No HS or LOC. Of note, patient has had multiple falls recently. Per primary team who obtained hx from daughter, patient is more interactive than baseline. In the ED, CT A/P showed acute emphysematous cystitis. Found with WBC 15.4, troponin 70->54, Elevated FSG. CK92. Na 150, K 3.0, positive UA. Patient was treated with CTX, Zosyn, and 1L NS in the ED.  Neuro consulted for CTH concerning for showed hydrocephalus of lateral and third ventricles and narrowed callosal angle suspicious for normal pressure hydrocephalus.      Impression: Difficult at this time to evaluate for NPH given acute medical illnesses and concern for infection. Can pursue NPH w/u in outpatient setting after treatment of infections.     Recommendations:  [] Outpatient MRI brain w/o contrast.  [] B12, TSH  [] Infectious w/u per primary team  [] Refer to outpatient Neurology for NPH diagnostic evaluation, where patient can have formal neuropsychological assessment as well as high-volume lumbar puncture with timed pre-LP and post-LP cognitive and gait assessment if indicated after clinical improvement from current illness.     Patient can follow-up with any of the following:    Neurology  Dr. Smooth Andrade MD.   [address], [place].   [phone].    Neurosurgery   Dr. Liza Mccain MD.   [address], [place].   [phone].      Discussed patient care with patient's daughter and other daughter over phone and in agreement. Delay in note entry/ note updates is due to patient care. Differential diagnosis considered is not limited to that listed above. Non-neurologic findings on imaging work up per primary team if applicable.  Reviewed relevant neurologic imaging and findings with patient/ family and primary team. Recommendations will be finalized/amended once signed by attending. Patient Nadja Ramirez is a 78 yo F PMH IDDM, HTN, HLD, OP, Parkinson's, GCA, peripheral neuropathy, diabetic retinopathy w/ reduced vision, BIBEMS from [facility]. Per daughter, patient has been at [facility]. She has been having poor PO intake, less interactive, less verbal and less ambulatory. Patient was found on the floor after a fall. No HS or LOC. Of note, patient has had multiple falls recently. Per primary team who obtained hx from daughter, patient is more interactive than baseline. In the ED, CT A/P showed acute emphysematous cystitis. Found with WBC 15.4, troponin 70->54, Elevated FSG. CK92. Na 150, K 3.0, positive UA. Patient was treated with CTX, Zosyn, and 1L NS in the ED.  Neuro consulted for CTH concerning for showed hydrocephalus of lateral and third ventricles and narrowed callosal angle suspicious for normal pressure hydrocephalus.      Impression: Difficult at this time to evaluate for NPH given acute medical illnesses and concern for infection. Can pursue NPH w/u in outpatient setting after treatment of infections.     Recommendations:  [] Outpatient MRI brain w/o contrast.  [] B12, TSH, folate  [] Infectious w/u per primary team  [] Refer to outpatient Neurology for NPH diagnostic evaluation, where patient can have formal neuropsychological assessment as well as high-volume lumbar puncture with timed pre-LP and post-LP cognitive and gait assessment, if work up is indicated after clinical improvement from current illness.   [] Neurology to sign off if no further questions    Patient can follow-up with any of the following:    Neurology  Dr. Smooth Andrade MD.   [address], [place].   [phone].    Neurosurgery   Dr. Liza Mccain MD.   [address], [place].   [phone].      Discussed patient care with patient's daughter and other daughter over phone and in agreement. Delay in note entry/ note updates is due to patient care. Differential diagnosis considered is not limited to that listed above. Non-neurologic findings on imaging work up per primary team if applicable.  Reviewed relevant neurologic imaging and findings with patient/ family and primary team. Recommendations will be finalized/amended once signed by attending. Patient Nadja Ramirez is a 80 yo F PMH IDDM, HTN, HLD, OP, Parkinson's, GCA, peripheral neuropathy, diabetic retinopathy w/ reduced vision, BIBEMS from [facility]. Per daughter, patient has been at [facility]. She has been having poor PO intake, less interactive, less verbal and less ambulatory. Patient was found on the floor after a fall. No HS or LOC. Of note, patient has had multiple falls recently. Per primary team who obtained hx from daughter, patient is more interactive than baseline. In the ED, CT A/P showed acute emphysematous cystitis. Found with WBC 15.4, troponin 70->54, Elevated FSG. CK92. Na 150, K 3.0, positive UA. Patient was treated with CTX, Zosyn, and 1L NS in the ED.  Neuro consulted for CTH concerning for showed hydrocephalus of lateral and third ventricles and narrowed callosal angle suspicious for normal pressure hydrocephalus.      Impression: Difficult at this time to evaluate for NPH given acute medical illnesses and concern for infection. Can pursue NPH w/u in outpatient setting after treatment of infections.     Recommendations:  [] Outpatient MRI brain w/o contrast.  [] B12, TSH, folate  [] Infectious w/u per primary team  [] Refer to outpatient Neurology for NPH diagnostic evaluation, where patient can have formal neuropsychological assessment as well as high-volume lumbar puncture with timed pre-LP and post-LP cognitive and gait assessment, if work up is indicated after clinical improvement from current illness.   [] Neurology to sign off if no further questions    Patient can follow-up with any of the following:    Neurology  Dr. Smooth Andrade MD.   [address], [place].   [phone].    Neurosurgery   Dr. Liza Mccain MD.   [address], [place].   [phone].      Discussed patient care with patient's daughter and other daughter over phone and in agreement. Delay in note entry/ note updates is due to patient care. Differential diagnosis considered is not limited to that listed above. Non-neurologic findings on imaging work up per primary team if applicable.  Reviewed relevant neurologic imaging and findings with patient/ family and primary team at [id number] at ~7:30PM. Recommendations will be finalized/amended once signed by attending. Patient Nadja Ramirez is a 78 yo F PMH IDDM, HTN, HLD, OP, Parkinson's, GCA, peripheral neuropathy, diabetic retinopathy w/ reduced vision, BIBEMS from [facility]. Per daughter, patient has been at [facility] and LKW per daughter was 1/9/24 on patient's birthday when family went to celebrate. At that time patient was able to walk independently, but generally weak. Talking, eating regularly and making jokes. Then patient's other daughter Jennifer would call and check in daily after that. They noted a progressive decline in her where she was less interactive, decreased appetite, speaking less. This was notable Saturday 1/27/24 per Margareth by the other daughter Jennifer . Then they went to see her and saw her with concerns of failure to thrive. Labwork was performed and resulting abnormalities caused her to be transferred to [facility] for further workup. Here UA positive, CT A/P concerning for acute emphysematous cystitis. Neuro consulted for NPH initially. Then overnight CT angio head and neck performed with findings below. On re-evaluation, patient has improved clinical status, more interactive and following more commands, speaking more words though hypophonic. Moving extremities symmetrically. Still not back to baseline however.     LKW: 1/9/24  NIHSS: 14   Not a tenecteplase candidate due to outside window  Not a thrombectomy candidate due to no large vessel occlusion    Impression:   1) Depressed level of consciousness concerning for diffuse brain dysfunction likely secondary to metabolic and infectious encephalopathy found with moderate to severe intracranial atherosclerotic disease most notable in the vertebrobasilar system. From neurovascular standpoint findings are likely asymptomatic but as precaution can pursue inpatient MRI brain.  2) Given multiple metabolic derangements and infectious etiologies, it would be difficult to evaluate for NPH in the inpatient setting. Can pursue NPH w/u in outpatient setting after her acute issues are resolved.    Recommendations:  #Stroke management  [ ] Recommend from neurovascular perspective to start aspirin 81mg daily for now at least pending MRI brain imaging if not otherwise acutely medically contraindicated  [ ] Atorvastatin 40-80 mg daily titrated per LDL < 70  [ ] HgbA1C, fasting lipid panel, CBC, CMP, coag panel, troponin  [ ] MRI brain w/o con   [ ] If found with stroke will consider TTE and ILR   [ ] Please consider nutrition, even if NG tube  [ ] telemetry to check for arrhythmia, EKG     - Glucose control    - Neuro-checks and VS q4h  - Gradual normotension  - Dysphagia screen. If fails, speech/swallow eval  - aspiration, fall precautions  - STAT CT head non-contrast for change in neuro exam and notify neurology  - PT/ OT / DVT ppx per primary team (okay from our end for chemical DVT ppx)    #NPH recommendations:  [ ] B12, TSH, folate  [ ] Infectious w/u per primary team  [ ] Refer to outpatient Neurology for NPH diagnostic evaluation, where patient can have proper assessment, if work up is indicated after clinical improvement from current illness.   Patient can follow-up with any of the following: Dr. Smooth Andrade MD. [address], [place]. [phone]. Dr. Liza Mccain MD. [address], [place]. [phone].      1/30/24: Discussed patient care with patient's daughter and other daughter over phone and in agreement. Delay in note entry/ note updates is due to patient care. Differential diagnosis considered is not limited to that listed above. Non-neurologic findings on imaging work up per primary team if applicable.  Reviewed relevant neurologic imaging and findings with patient/ family and primary team at [id number] at ~7:30PM. Recommendations will be finalized/amended once signed by attending.      1/31/24: Patient was seen on rounds with neuro attending Dr Libman. Recommendations above in agreement with neuro attending at time of note documentation and any resident documentation updates. Reviewed relevant neurologic imaging and findings with patient's daughter and our recommendations in agreement. Delay in patient note entry/ updates to note due to patient care. Recommendations finalized/addended once signed by attending.  Patient Nadja Ramirez is a 78 yo F PMH IDDM, HTN, HLD, OP, Parkinson's, GCA, peripheral neuropathy, diabetic retinopathy w/ reduced vision, BIBEMS from [facility]. Per daughter, patient has been at [facility] and LKW per daughter was 1/9/24 on patient's birthday when family went to celebrate. At that time patient was able to walk independently, but generally weak. Talking, eating regularly and making jokes. Then patient's other daughter Jennifer would call and check in daily after that. They noted a progressive decline in her where she was less interactive, decreased appetite, speaking less. This was notable Saturday 1/27/24 per Margareth by the other daughter Jennifer . Then they went to see her and saw her with concerns of failure to thrive. Labwork was performed and resulting abnormalities caused her to be transferred to [facility] for further workup. Here UA positive, CT A/P concerning for acute emphysematous cystitis. Neuro consulted for NPH initially. Then overnight CT angio head and neck performed with findings below. On re-evaluation, patient has improved clinical status, more interactive and following more commands, speaking more words though hypophonic. Moving extremities symmetrically. Still not back to baseline however.     LKW: 1/9/24  NIHSS: 14   Not a tenecteplase candidate due to outside window  Not a thrombectomy candidate due to no large vessel occlusion    Impression:   1) Depressed level of consciousness concerning for diffuse brain dysfunction likely secondary to metabolic and infectious encephalopathy found with moderate to severe intracranial atherosclerotic disease most notable in the vertebrobasilar system. From neurovascular standpoint findings are likely asymptomatic but as precaution can pursue inpatient MRI brain.  2) Given multiple metabolic derangements and infectious etiologies, it would be difficult to evaluate for NPH in the inpatient setting. Can pursue NPH w/u in outpatient setting after her acute issues are resolved.    Recommendations:  #Stroke management  [ ] Recommend from neurovascular perspective to start aspirin 81mg daily for now at least pending MRI brain imaging if not otherwise acutely medically contraindicated  [ ] Atorvastatin 40-80 mg daily titrated per LDL < 70  [ ] HgbA1C, fasting lipid panel, CBC, CMP, coag panel, troponin  [ ] MRI brain w/o con   [ ] If found with stroke will consider TTE and ILR   [ ] Please consider nutrition, even if NG tube  [ ] telemetry to check for arrhythmia, EKG     - Glucose control    - Neuro-checks and VS q4h  - Gradual normotension  - Dysphagia screen. If fails, speech/swallow eval  - aspiration, fall precautions  - STAT CT head non-contrast for change in neuro exam and notify neurology  - PT/ OT / DVT ppx per primary team (okay from our end for chemical DVT ppx)    #NPH recommendations:  [ ] B12, TSH, folate  [ ] Infectious w/u per primary team  [ ] Refer to outpatient Neurology for NPH diagnostic evaluation, where patient can have proper assessment, if work up is indicated after clinical improvement from current illness.   Patient can follow-up with any of the following: Dr. Smooth Andrade MD. [address], [place]. [phone]. Dr. Liza Mccain MD. [address], [place]. [phone].      Discussed patient care with patient's daughter Margareth 1/30 and 1/31 and other daughter Jennifer over phone 1/30 and in agreement. Delay in note entry/ note updates is due to patient care. Patient seen on rounds with Dr Libman and in agreement with recommendations above. Non-neurologic findings on imaging work up per primary team if applicable.  Reviewed relevant neurologic imaging and findings with family 1/30 and 1/31 and primary team.

## 2024-01-31 LAB
A1C WITH ESTIMATED AVERAGE GLUCOSE RESULT: 7.5 % — HIGH (ref 4–5.6)
ALBUMIN SERPL ELPH-MCNC: 3.1 G/DL — LOW (ref 3.3–5)
ALP SERPL-CCNC: 50 U/L — SIGNIFICANT CHANGE UP (ref 40–120)
ALT FLD-CCNC: <5 U/L — SIGNIFICANT CHANGE UP (ref 4–33)
ANION GAP SERPL CALC-SCNC: 15 MMOL/L — HIGH (ref 7–14)
ANION GAP SERPL CALC-SCNC: 9 MMOL/L — SIGNIFICANT CHANGE UP (ref 7–14)
AST SERPL-CCNC: 21 U/L — SIGNIFICANT CHANGE UP (ref 4–32)
BILIRUB SERPL-MCNC: 0.3 MG/DL — SIGNIFICANT CHANGE UP (ref 0.2–1.2)
BUN SERPL-MCNC: 16 MG/DL — SIGNIFICANT CHANGE UP (ref 7–23)
BUN SERPL-MCNC: 17 MG/DL — SIGNIFICANT CHANGE UP (ref 7–23)
CALCIUM SERPL-MCNC: 8.4 MG/DL — SIGNIFICANT CHANGE UP (ref 8.4–10.5)
CALCIUM SERPL-MCNC: 8.6 MG/DL — SIGNIFICANT CHANGE UP (ref 8.4–10.5)
CHLORIDE SERPL-SCNC: 113 MMOL/L — HIGH (ref 98–107)
CHLORIDE SERPL-SCNC: 115 MMOL/L — HIGH (ref 98–107)
CHOLEST SERPL-MCNC: 152 MG/DL — SIGNIFICANT CHANGE UP
CO2 SERPL-SCNC: 21 MMOL/L — LOW (ref 22–31)
CO2 SERPL-SCNC: 24 MMOL/L — SIGNIFICANT CHANGE UP (ref 22–31)
CREAT SERPL-MCNC: 0.35 MG/DL — LOW (ref 0.5–1.3)
CREAT SERPL-MCNC: 0.35 MG/DL — LOW (ref 0.5–1.3)
EGFR: 103 ML/MIN/1.73M2 — SIGNIFICANT CHANGE UP
EGFR: 103 ML/MIN/1.73M2 — SIGNIFICANT CHANGE UP
ESTIMATED AVERAGE GLUCOSE: 169 — SIGNIFICANT CHANGE UP
FERRITIN SERPL-MCNC: 1046 NG/ML — HIGH (ref 13–330)
GLUCOSE BLDC GLUCOMTR-MCNC: 100 MG/DL — HIGH (ref 70–99)
GLUCOSE BLDC GLUCOMTR-MCNC: 165 MG/DL — HIGH (ref 70–99)
GLUCOSE BLDC GLUCOMTR-MCNC: 241 MG/DL — HIGH (ref 70–99)
GLUCOSE BLDC GLUCOMTR-MCNC: 76 MG/DL — SIGNIFICANT CHANGE UP (ref 70–99)
GLUCOSE SERPL-MCNC: 245 MG/DL — HIGH (ref 70–99)
GLUCOSE SERPL-MCNC: 89 MG/DL — SIGNIFICANT CHANGE UP (ref 70–99)
HCT VFR BLD CALC: 31.9 % — LOW (ref 34.5–45)
HDLC SERPL-MCNC: 48 MG/DL — LOW
HGB BLD-MCNC: 10.5 G/DL — LOW (ref 11.5–15.5)
IRON SATN MFR SERPL: 32 % — SIGNIFICANT CHANGE UP (ref 14–50)
IRON SATN MFR SERPL: 59 UG/DL — SIGNIFICANT CHANGE UP (ref 30–160)
LIPID PNL WITH DIRECT LDL SERPL: 65 MG/DL — SIGNIFICANT CHANGE UP
MAGNESIUM SERPL-MCNC: 1.6 MG/DL — SIGNIFICANT CHANGE UP (ref 1.6–2.6)
MAGNESIUM SERPL-MCNC: 1.9 MG/DL — SIGNIFICANT CHANGE UP (ref 1.6–2.6)
MCHC RBC-ENTMCNC: 27.7 PG — SIGNIFICANT CHANGE UP (ref 27–34)
MCHC RBC-ENTMCNC: 32.9 GM/DL — SIGNIFICANT CHANGE UP (ref 32–36)
MCV RBC AUTO: 84.2 FL — SIGNIFICANT CHANGE UP (ref 80–100)
NON HDL CHOLESTEROL: 104 MG/DL — SIGNIFICANT CHANGE UP
NRBC # BLD: 0 /100 WBCS — SIGNIFICANT CHANGE UP (ref 0–0)
NRBC # FLD: 0 K/UL — SIGNIFICANT CHANGE UP (ref 0–0)
PHOSPHATE SERPL-MCNC: 1.5 MG/DL — LOW (ref 2.5–4.5)
PHOSPHATE SERPL-MCNC: 3 MG/DL — SIGNIFICANT CHANGE UP (ref 2.5–4.5)
PLATELET # BLD AUTO: 167 K/UL — SIGNIFICANT CHANGE UP (ref 150–400)
POTASSIUM SERPL-MCNC: 3 MMOL/L — LOW (ref 3.5–5.3)
POTASSIUM SERPL-MCNC: 4.1 MMOL/L — SIGNIFICANT CHANGE UP (ref 3.5–5.3)
POTASSIUM SERPL-SCNC: 3 MMOL/L — LOW (ref 3.5–5.3)
POTASSIUM SERPL-SCNC: 4.1 MMOL/L — SIGNIFICANT CHANGE UP (ref 3.5–5.3)
PROT SERPL-MCNC: 5.6 G/DL — LOW (ref 6–8.3)
RBC # BLD: 3.79 M/UL — LOW (ref 3.8–5.2)
RBC # FLD: 21 % — HIGH (ref 10.3–14.5)
SODIUM SERPL-SCNC: 148 MMOL/L — HIGH (ref 135–145)
SODIUM SERPL-SCNC: 149 MMOL/L — HIGH (ref 135–145)
TIBC SERPL-MCNC: 183 UG/DL — LOW (ref 220–430)
TRIGL SERPL-MCNC: 195 MG/DL — HIGH
UIBC SERPL-MCNC: 124 UG/DL — SIGNIFICANT CHANGE UP (ref 110–370)
WBC # BLD: 9.1 K/UL — SIGNIFICANT CHANGE UP (ref 3.8–10.5)
WBC # FLD AUTO: 9.1 K/UL — SIGNIFICANT CHANGE UP (ref 3.8–10.5)

## 2024-01-31 PROCEDURE — 99223 1ST HOSP IP/OBS HIGH 75: CPT

## 2024-01-31 PROCEDURE — 99233 SBSQ HOSP IP/OBS HIGH 50: CPT

## 2024-01-31 RX ORDER — DEXTROSE 50 % IN WATER 50 %
12.5 SYRINGE (ML) INTRAVENOUS ONCE
Refills: 0 | Status: DISCONTINUED | OUTPATIENT
Start: 2024-01-31 | End: 2024-02-14

## 2024-01-31 RX ORDER — ACETAMINOPHEN 500 MG
750 TABLET ORAL ONCE
Refills: 0 | Status: COMPLETED | OUTPATIENT
Start: 2024-01-31 | End: 2024-01-31

## 2024-01-31 RX ORDER — POTASSIUM CHLORIDE 20 MEQ
10 PACKET (EA) ORAL
Refills: 0 | Status: COMPLETED | OUTPATIENT
Start: 2024-01-31 | End: 2024-01-31

## 2024-01-31 RX ORDER — INSULIN GLARGINE 100 [IU]/ML
5 INJECTION, SOLUTION SUBCUTANEOUS AT BEDTIME
Refills: 0 | Status: DISCONTINUED | OUTPATIENT
Start: 2024-01-31 | End: 2024-02-02

## 2024-01-31 RX ORDER — ASPIRIN/CALCIUM CARB/MAGNESIUM 324 MG
81 TABLET ORAL DAILY
Refills: 0 | Status: DISCONTINUED | OUTPATIENT
Start: 2024-01-31 | End: 2024-02-14

## 2024-01-31 RX ORDER — SODIUM CHLORIDE 9 MG/ML
500 INJECTION INTRAMUSCULAR; INTRAVENOUS; SUBCUTANEOUS ONCE
Refills: 0 | Status: COMPLETED | OUTPATIENT
Start: 2024-01-31 | End: 2024-01-31

## 2024-01-31 RX ORDER — POTASSIUM PHOSPHATE, MONOBASIC POTASSIUM PHOSPHATE, DIBASIC 236; 224 MG/ML; MG/ML
30 INJECTION, SOLUTION INTRAVENOUS ONCE
Refills: 0 | Status: COMPLETED | OUTPATIENT
Start: 2024-01-31 | End: 2024-01-31

## 2024-01-31 RX ORDER — DEXTROSE MONOHYDRATE, SODIUM CHLORIDE, AND POTASSIUM CHLORIDE 50; .745; 4.5 G/1000ML; G/1000ML; G/1000ML
1000 INJECTION, SOLUTION INTRAVENOUS
Refills: 0 | Status: DISCONTINUED | OUTPATIENT
Start: 2024-01-31 | End: 2024-01-31

## 2024-01-31 RX ORDER — SODIUM CHLORIDE 9 MG/ML
1000 INJECTION, SOLUTION INTRAVENOUS
Refills: 0 | Status: DISCONTINUED | OUTPATIENT
Start: 2024-01-31 | End: 2024-02-01

## 2024-01-31 RX ADMIN — SODIUM CHLORIDE 75 MILLILITER(S): 9 INJECTION, SOLUTION INTRAVENOUS at 06:29

## 2024-01-31 RX ADMIN — DEXTROSE MONOHYDRATE, SODIUM CHLORIDE, AND POTASSIUM CHLORIDE 75 MILLILITER(S): 50; .745; 4.5 INJECTION, SOLUTION INTRAVENOUS at 14:24

## 2024-01-31 RX ADMIN — Medication 100 MILLIEQUIVALENT(S): at 12:47

## 2024-01-31 RX ADMIN — CEFTRIAXONE 100 MILLIGRAM(S): 500 INJECTION, POWDER, FOR SOLUTION INTRAMUSCULAR; INTRAVENOUS at 09:36

## 2024-01-31 RX ADMIN — Medication 2: at 12:59

## 2024-01-31 RX ADMIN — Medication 4: at 09:32

## 2024-01-31 RX ADMIN — Medication 100 MILLIEQUIVALENT(S): at 09:32

## 2024-01-31 RX ADMIN — Medication 300 MILLIGRAM(S): at 18:05

## 2024-01-31 RX ADMIN — SODIUM CHLORIDE 50 MILLILITER(S): 9 INJECTION, SOLUTION INTRAVENOUS at 20:03

## 2024-01-31 RX ADMIN — SODIUM CHLORIDE 500 MILLILITER(S): 9 INJECTION INTRAMUSCULAR; INTRAVENOUS; SUBCUTANEOUS at 11:21

## 2024-01-31 RX ADMIN — Medication 100 MILLIEQUIVALENT(S): at 14:24

## 2024-01-31 RX ADMIN — POTASSIUM PHOSPHATE, MONOBASIC POTASSIUM PHOSPHATE, DIBASIC 83.33 MILLIMOLE(S): 236; 224 INJECTION, SOLUTION INTRAVENOUS at 16:46

## 2024-01-31 RX ADMIN — Medication 1 APPLICATION(S): at 06:28

## 2024-01-31 NOTE — PHYSICAL THERAPY INITIAL EVALUATION ADULT - GROSSLY INTACT, SENSORY
unable to assess secondary to pt's current cognitive status and is unable to answer questions at this time

## 2024-01-31 NOTE — PROGRESS NOTE ADULT - PROBLEM SELECTOR PLAN 10
IDDM2  Home regimen: glargine 15u qHS, admelog 7u TID qAC  While NPO will hold admelog  will reduce lantus to 5u   Monitor FSG, maintain FITZ, hypoglycemia protocol

## 2024-01-31 NOTE — OCCUPATIONAL THERAPY INITIAL EVALUATION ADULT - PLANNED THERAPY INTERVENTIONS, OT EVAL
Monitor for increased risk corneal edema. ADL retraining/balance training/bed mobility training/cognitive, visual perceptual/ROM/strengthening/transfer training

## 2024-01-31 NOTE — CHART NOTE - NSCHARTNOTEFT_GEN_A_CORE
Please do not continue methotrexate while pt is being treated for infection  This medication will stay in her system 3-6 months so there is no concern for flare due to holding medication. Please do not continue methotrexate while pt is being treated for infection, continue folic acid to reduce chance of side effects.    This medication will stay in her system 3-6 months so there is no concern for flare due to holding medication.

## 2024-01-31 NOTE — OCCUPATIONAL THERAPY INITIAL EVALUATION ADULT - PERTINENT HX OF CURRENT PROBLEM, REHAB EVAL
79 year old female with history of IDDM, HTN, HLD, OP, Parkinson's, GCA brought in from [facility] s/p found on the floor after a fall. CT head was negative for bleed or infarct but showed hydrocephalus. CT A/P concerning for acute emphysematous cystitis.

## 2024-01-31 NOTE — PROGRESS NOTE ADULT - ASSESSMENT
78 yo F PMH IDDM, HTN, HLD, OP, Parkinson's, GCA BIBEMS from [facility] s/p Fall and AMS. found to have toxic/metabolic derangement wither hypernatremia and acute cystitis, and additionally cannot rule out stroke.

## 2024-01-31 NOTE — CHART NOTE - NSCHARTNOTEFT_GEN_A_CORE
Neurology    Neurology team overnight was notified regarding CT angio findings of head/neck. We updated our note from prior date. Our attending to attest note from 1/30 with recommendations. Please refer to updated note. Thank you.

## 2024-01-31 NOTE — PROGRESS NOTE ADULT - PROBLEM SELECTOR PLAN 2
Patient is A&Ox0 on admission, currently improved to ANO1. AMS likely 2/2 toxic metabolic derangements as below, but cannot ro neurovascular etiology.   Per daughter at baseline patient is A&Ox3 and conversant.    CTH, CTA H/N with "No acute intracranial hemorrhage, mass, or large vessel occlusion. No aneurysm. prominent lateral and third ventricles; severely diminuative veribrobasilar system with small areas of minimal filling in   the basilar artery. Prominent b/l PCOMs"  fu MRI Brain and MRA  per neuro: start aspirin 81mg  optho eval in the am  Management for electrolyte abnormalities as below and treat UTI as below  NPO pending S/S eval, maintain aspiration precautions  Neuro checks

## 2024-01-31 NOTE — PHYSICAL THERAPY INITIAL EVALUATION ADULT - ADDITIONAL COMMENTS
Pt unable to participate in subjective history secondary to current mental status. Per EMR, pt is presenting from [facility] secondary to fall.    Pt left semi-supine in bed, all lines intact, all needs in reach, bed alarm set, in NAD. SHYAM Ramírez aware. Heart rate 88 beats per minute.

## 2024-01-31 NOTE — PHYSICAL THERAPY INITIAL EVALUATION ADULT - NSPTDISCHREC_GEN_A_CORE
Return to Rehab once medically optimized ot continue addressing functional limitations and objective deficits.

## 2024-01-31 NOTE — CHART NOTE - NSCHARTNOTEFT_GEN_A_CORE
MEDICINE ACP NIGHT COVERAGE     Notified by radiology of below CTA results:    "No acute intracranial hemorrhage, mass, or large vessel occlusion. No aneurysm. prominent lateral and third ventricles; severely diminuative veribrobasilar system with small areas of minimal filling in   the basilar artery. Prominent b/l PCOMs"    Radiology suggesting MRA Brain Noncon (ordered). Neurology also made aware, awaiting final recommendations. Will endorse to day team.    Remi Hernandez PA-C  Medicine ACP  [id number]

## 2024-01-31 NOTE — PHYSICAL THERAPY INITIAL EVALUATION ADULT - PERTINENT HX OF CURRENT PROBLEM, REHAB EVAL
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

## 2024-01-31 NOTE — PHYSICAL THERAPY INITIAL EVALUATION ADULT - PHYSICAL ASSIST/NONPHYSICAL ASSIST: SIT/SUPINE, REHAB EVAL
Awake alert oriented NAD    Denies CNS ENT CP GI  OR DERM SX    Still with R knee pain    Past Medical History:   Diagnosis Date    Anemia     Arthritis     Back pain     Bishop's esophagus     BPH (benign prostatic hypertrophy)     Chronic kidney disease     Cirrhosis     Diabetes mellitus     Diabetes mellitus, type 2     Diabetes with neurologic complications     Diabetic retinopathy     Elevated PSA     Heart failure     Hemolytic anemia     Hepatitis Hepatitis C    Hepatitis C     Hyperlipidemia     Hypertension     Hypertension     Immune disorder     Liver transplanted     Peripheral neuropathy     Sleep apnea     Transfusion reaction     Hep C from prev. blood transfusion    Transplanted liver     Trouble in sleeping     Type 2 diabetes mellitus with ophthalmic manifestations     Type 2 diabetes with peripheral circulatory disorder, controlled     Type II or unspecified type diabetes mellitus with renal manifestations, uncontrolled(250.42)     Type II or unspecified type diabetes mellitus with renal manifestations, uncontrolled(250.42)      Review of patient's allergies indicates:   Allergen Reactions    Corticosteroids (glucocorticoids) Other (See Comments)     Leg swelling    Hydrocortisone      Leg swelling    Neuromuscular blockers, steroidal      Leg swelling    Ribavirin      Intolerance, arthralgias       Current Facility-Administered Medications   Medication    acetaminophen tablet 650 mg    allopurinol tablet 100 mg    aspirin chewable tablet 81 mg    carvedilol tablet 6.25 mg    cefTRIAXone (ROCEPHIN) 1 g in dextrose 5 % 50 mL IVPB    clonazePAM tablet 0.5 mg    cloNIDine tablet 0.1 mg    colchicine capsule/tablet 0.6 mg    dextrose 50% injection 12.5 g    dextrose 50% injection 25 g    doxazosin tablet 8 mg    epoetin daniel injection 10,000 Units    glucagon (human recombinant) injection 1 mg    glucose chewable tablet 16 g    glucose chewable  tablet 24 g    heparin (porcine) injection 5,000 Units    hydrALAZINE tablet 100 mg    hydrOXYzine HCl tablet 50 mg    insulin aspart pen 1-10 Units    insulin detemir pen 20 Units    minoxidil tablet 5 mg    montelukast tablet 10 mg    ondansetron disintegrating tablet 4 mg    oxycodone-acetaminophen 5-325 mg per tablet 2 tablet    pantoprazole EC tablet 40 mg    sevelamer carbonate tablet 800 mg    tacrolimus capsule 0.5 mg    vancomycin (VANCOCIN) 1,250 mg in dextrose 5 % 250 mL IVPB       LABS    Recent Results (from the past 24 hour(s))   POCT glucose    Collection Time: 10/15/17 11:56 AM   Result Value Ref Range    POCT Glucose 94 70 - 110 mg/dL   POCT glucose    Collection Time: 10/15/17  4:25 PM   Result Value Ref Range    POCT Glucose 155 (H) 70 - 110 mg/dL   Ammonia    Collection Time: 10/15/17  5:51 PM   Result Value Ref Range    Ammonia 45 10 - 50 umol/L   POCT glucose    Collection Time: 10/15/17  9:27 PM   Result Value Ref Range    POCT Glucose 142 (H) 70 - 110 mg/dL   Basic metabolic panel    Collection Time: 10/16/17  6:44 AM   Result Value Ref Range    Sodium 139 136 - 145 mmol/L    Potassium 4.1 3.5 - 5.1 mmol/L    Chloride 95 95 - 110 mmol/L    CO2 32 (H) 23 - 29 mmol/L    Glucose 85 70 - 110 mg/dL    BUN, Bld 44 (H) 8 - 23 mg/dL    Creatinine 4.7 (H) 0.5 - 1.4 mg/dL    Calcium 8.4 (L) 8.7 - 10.5 mg/dL    Anion Gap 12 8 - 16 mmol/L    eGFR if African American 13 (A) >60 mL/min/1.73 m^2    eGFR if non African American 11 (A) >60 mL/min/1.73 m^2   CBC auto differential    Collection Time: 10/16/17  6:44 AM   Result Value Ref Range    WBC 6.94 3.90 - 12.70 K/uL    RBC 3.61 (L) 4.60 - 6.20 M/uL    Hemoglobin 10.2 (L) 14.0 - 18.0 g/dL    Hematocrit 30.6 (L) 40.0 - 54.0 %    MCV 85 82 - 98 fL    MCH 28.3 27.0 - 31.0 pg    MCHC 33.3 32.0 - 36.0 g/dL    RDW 16.2 (H) 11.5 - 14.5 %    Platelets 75 (L) 150 - 350 K/uL    MPV 10.7 9.2 - 12.9 fL    Gran # 4.6 1.8 - 7.7 K/uL    Lymph # 1.2 1.0 - 4.8  K/uL    Mono # 0.8 0.3 - 1.0 K/uL    Eos # 0.3 0.0 - 0.5 K/uL    Baso # 0.04 0.00 - 0.20 K/uL    Gran% 66.8 38.0 - 73.0 %    Lymph% 17.0 (L) 18.0 - 48.0 %    Mono% 10.8 4.0 - 15.0 %    Eosinophil% 4.8 0.0 - 8.0 %    Basophil% 0.6 0.0 - 1.9 %    Differential Method Automated    POCT glucose    Collection Time: 10/16/17  7:35 AM   Result Value Ref Range    POCT Glucose 77 70 - 110 mg/dL   ]    I/O last 3 completed shifts:  In: 1260 [P.O.:1160; IV Piggyback:100]  Out: 700 [Urine:700]    Vitals:    10/16/17 0102 10/16/17 0407 10/16/17 0732 10/16/17 0827   BP: 120/62 127/60 (!) 122/58 128/61   Pulse: 63 66 65 67   Resp: 17 18 19    Temp: 97.9 °F (36.6 °C) 98.4 °F (36.9 °C) 98.2 °F (36.8 °C)    TempSrc: Oral Oral     SpO2: (!) 90% (!) 90% (!) 89% (!) 92%   Weight: 84.1 kg (185 lb 6.5 oz)      Height:           No Jvd, Thyromegaly or Lymphadenopathy  Lungs: Fairly clear anteriorly and laterally  Cor: RRR no G or rubs  Abd: Soft benign good bowel sounds non tender  Ext: No E C C    A)    ESLD sp Liver tx 2001  ESRD on chronic HD [facility]  Last surgical eval was an arteriogram by Dr William last month  Fever with STREPTOCOCCUS AGALACTIAE (GROUP B) sepsis  Immune suppressed chronically  DM  Wide anion gap  Rising bicarb slightly better this am  Anemia and thrombocytopenia (75K)  HTN  Elevated total protein ?? Immunofix Interpretation:  IGG KAPPA SPECIFIC MONOLCONAL BAND IN MID-GAMMA.  Interpreted by Neelima Olmstead, PhD. DABCC 7/30/12  MGUS  DJD  R Knee Pain  GOUT  Hep c  Neuropathy  KARON (does not use his bipap)  Pulmonary htn  Small pericardial effusion (10/6/17)  Chronic constipation  2nd hypeprth  Barretts esophagus  Mild Hyperammonemia    P)  Cont rx as is  HD today  Consider echo  Consider lactulose       verbal cues/nonverbal cues (demo/gestures)/1 person assist

## 2024-01-31 NOTE — PROGRESS NOTE ADULT - SUBJECTIVE AND OBJECTIVE BOX
[facility] Department of Hospital Medicine    Patient is a 79y old  Female who presents with a chief complaint of       SUBJECTIVE: Pt seen and examined at bedside this morning. responds to verbal, mostly somnolent and doesn't participate.     MEDICATIONS  (STANDING):  acetaminophen   IVPB .. 750 milliGRAM(s) IV Intermittent once  aspirin enteric coated 81 milliGRAM(s) Oral daily  atorvastatin 40 milliGRAM(s) Oral at bedtime  calcium carbonate 1250 mG  + Vitamin D (OsCal 500 + D) 1 Tablet(s) Oral daily  carbidopa/levodopa  25/100 1.5 Tablet(s) Oral three times a day  carbidopa/levodopa CR 25/100 1 Tablet(s) Oral at bedtime  cefTRIAXone   IVPB 1000 milliGRAM(s) IV Intermittent every 24 hours  dextrose 5% + sodium chloride 0.45%. 1000 milliLiter(s) (50 mL/Hr) IV Continuous <Continuous>  dextrose 5%. 1000 milliLiter(s) (50 mL/Hr) IV Continuous <Continuous>  dextrose 50% Injectable 12.5 Gram(s) IV Push once  dextrose 50% Injectable 25 Gram(s) IV Push once  fenofibrate Tablet 145 milliGRAM(s) Oral daily  ferrous    sulfate Liquid 220 milliGRAM(s) Oral daily  folic acid 1 milliGRAM(s) Oral daily  glucagon  Injectable 1 milliGRAM(s) IntraMuscular once  insulin glargine Injectable (LANTUS) 10 Unit(s) SubCutaneous at bedtime  insulin lispro (ADMELOG) corrective regimen sliding scale   SubCutaneous three times a day before meals  metoprolol succinate ER 50 milliGRAM(s) Oral daily  mirtazapine 7.5 milliGRAM(s) Oral daily  pantoprazole    Tablet 40 milliGRAM(s) Oral before breakfast  polyethylene glycol 3350 17 Gram(s) Oral daily  predniSONE   Tablet 20 milliGRAM(s) Oral daily  senna 2 Tablet(s) Oral at bedtime  silver sulfADIAZINE 1% Cream 1 Application(s) Topical two times a day    MEDICATIONS  (PRN):  acetaminophen     Tablet .. 650 milliGRAM(s) Oral every 6 hours PRN Temp greater or equal to 38C (100.4F), Mild Pain (1 - 3)  dextrose Oral Gel 15 Gram(s) Oral once PRN Blood Glucose LESS THAN 70 milliGRAM(s)/deciliter      CAPILLARY BLOOD GLUCOSE      POCT Blood Glucose.: 76 mg/dL (31 Jan 2024 17:17)  POCT Blood Glucose.: 165 mg/dL (31 Jan 2024 12:08)  POCT Blood Glucose.: 239 mg/dL (31 Jan 2024 09:17)  POCT Blood Glucose.: 261 mg/dL (31 Jan 2024 01:25)  POCT Blood Glucose.: 238 mg/dL (30 Jan 2024 22:16)    I&O's Summary      PHYSICAL EXAM:  Vital Signs Last 24 Hrs  T(C): 36.8 (31 Jan 2024 15:55), Max: 36.8 (31 Jan 2024 15:55)  T(F): 98.3 (31 Jan 2024 15:55), Max: 98.3 (31 Jan 2024 15:55)  HR: 86 (31 Jan 2024 15:55) (86 - 95)  BP: 125/70 (31 Jan 2024 15:55) (125/70 - 145/68)  BP(mean): --  RR: 17 (31 Jan 2024 15:55) (16 - 17)  SpO2: 99% (31 Jan 2024 15:55) (99% - 100%)    Parameters below as of 31 Jan 2024 15:55  Patient On (Oxygen Delivery Method): room air        CONSTITUTIONAL: lethargic   RESPIRATORY: No increased work of breathing, CTAB, no wheezes or crackles appreciated  CARDIOVASCULAR: RRR, S1 and S2 present, no m/r/g  ABDOMEN: soft, NT, ND, bowel sounds present  EXTREMITIES: No LE edema  NEURO: A&Ox1    LABS:                        10.5   9.10  )-----------( 167      ( 31 Jan 2024 05:57 )             31.9     01-31    149<H>  |  113<H>  |  17  ----------------------------<  245<H>  3.0<L>   |  21<L>  |  0.35<L>    Ca    8.6      31 Jan 2024 05:57  Phos  1.5     01-31  Mg     1.90     01-31    TPro  5.6<L>  /  Alb  3.1<L>  /  TBili  0.3  /  DBili  x   /  AST  21  /  ALT  <5  /  AlkPhos  50  01-31    PT/INR - ( 30 Jan 2024 03:22 )   PT: 10.3 sec;   INR: 0.92 ratio         PTT - ( 30 Jan 2024 03:22 )  PTT:24.8 sec  CARDIAC MARKERS ( 30 Jan 2024 03:22 )  x     / x     / 92 U/L / x     / 26.1 ng/mL      Urinalysis Basic - ( 31 Jan 2024 05:57 )    Color: x / Appearance: x / SG: x / pH: x  Gluc: 245 mg/dL / Ketone: x  / Bili: x / Urobili: x   Blood: x / Protein: x / Nitrite: x   Leuk Esterase: x / RBC: x / WBC x   Sq Epi: x / Non Sq Epi: x / Bacteria: x          RADIOLOGY & ADDITIONAL TESTS:    Results Reviewed:   Imaging Personally Reviewed:  Electrocardiogram Personally Reviewed:    COORDINATION OF CARE:  Care Discussed with Consultants/Other Providers [Y/N]:  Prior or Outpatient Records Reviewed [Y/N]:

## 2024-01-31 NOTE — OCCUPATIONAL THERAPY INITIAL EVALUATION ADULT - ADDITIONAL COMMENTS
Patient unable to provide social history due to cognitive status. Per chart review, patient admitted from [facility].

## 2024-02-01 LAB
-  AMOXICILLIN/CLAVULANIC ACID: SIGNIFICANT CHANGE UP
-  AMPICILLIN/SULBACTAM: SIGNIFICANT CHANGE UP
-  AMPICILLIN: SIGNIFICANT CHANGE UP
-  AZTREONAM: SIGNIFICANT CHANGE UP
-  CEFAZOLIN: SIGNIFICANT CHANGE UP
-  CEFEPIME: SIGNIFICANT CHANGE UP
-  CEFOXITIN: SIGNIFICANT CHANGE UP
-  CEFTRIAXONE: SIGNIFICANT CHANGE UP
-  CEFUROXIME: SIGNIFICANT CHANGE UP
-  CIPROFLOXACIN: SIGNIFICANT CHANGE UP
-  ERTAPENEM: SIGNIFICANT CHANGE UP
-  GENTAMICIN: SIGNIFICANT CHANGE UP
-  IMIPENEM: SIGNIFICANT CHANGE UP
-  LEVOFLOXACIN: SIGNIFICANT CHANGE UP
-  MEROPENEM: SIGNIFICANT CHANGE UP
-  NITROFURANTOIN: SIGNIFICANT CHANGE UP
-  PIPERACILLIN/TAZOBACTAM: SIGNIFICANT CHANGE UP
-  TOBRAMYCIN: SIGNIFICANT CHANGE UP
-  TRIMETHOPRIM/SULFAMETHOXAZOLE: SIGNIFICANT CHANGE UP
ALBUMIN SERPL ELPH-MCNC: 2.8 G/DL — LOW (ref 3.3–5)
ANION GAP SERPL CALC-SCNC: 11 MMOL/L — SIGNIFICANT CHANGE UP (ref 7–14)
ANION GAP SERPL CALC-SCNC: 11 MMOL/L — SIGNIFICANT CHANGE UP (ref 7–14)
BUN SERPL-MCNC: 14 MG/DL — SIGNIFICANT CHANGE UP (ref 7–23)
BUN SERPL-MCNC: 15 MG/DL — SIGNIFICANT CHANGE UP (ref 7–23)
CALCIUM SERPL-MCNC: 8 MG/DL — LOW (ref 8.4–10.5)
CALCIUM SERPL-MCNC: 8.1 MG/DL — LOW (ref 8.4–10.5)
CHLORIDE SERPL-SCNC: 114 MMOL/L — HIGH (ref 98–107)
CHLORIDE SERPL-SCNC: 114 MMOL/L — HIGH (ref 98–107)
CO2 SERPL-SCNC: 22 MMOL/L — SIGNIFICANT CHANGE UP (ref 22–31)
CO2 SERPL-SCNC: 22 MMOL/L — SIGNIFICANT CHANGE UP (ref 22–31)
CREAT SERPL-MCNC: 0.32 MG/DL — LOW (ref 0.5–1.3)
CREAT SERPL-MCNC: 0.34 MG/DL — LOW (ref 0.5–1.3)
CRP SERPL-MCNC: 16 MG/L — HIGH
CULTURE RESULTS: ABNORMAL
EGFR: 104 ML/MIN/1.73M2 — SIGNIFICANT CHANGE UP
EGFR: 106 ML/MIN/1.73M2 — SIGNIFICANT CHANGE UP
GLUCOSE BLDC GLUCOMTR-MCNC: 110 MG/DL — HIGH (ref 70–99)
GLUCOSE BLDC GLUCOMTR-MCNC: 124 MG/DL — HIGH (ref 70–99)
GLUCOSE BLDC GLUCOMTR-MCNC: 157 MG/DL — HIGH (ref 70–99)
GLUCOSE BLDC GLUCOMTR-MCNC: 160 MG/DL — HIGH (ref 70–99)
GLUCOSE BLDC GLUCOMTR-MCNC: 171 MG/DL — HIGH (ref 70–99)
GLUCOSE SERPL-MCNC: 120 MG/DL — HIGH (ref 70–99)
GLUCOSE SERPL-MCNC: 151 MG/DL — HIGH (ref 70–99)
HCT VFR BLD CALC: 28 % — LOW (ref 34.5–45)
HGB BLD-MCNC: 9.3 G/DL — LOW (ref 11.5–15.5)
MAGNESIUM SERPL-MCNC: 1.5 MG/DL — LOW (ref 1.6–2.6)
MAGNESIUM SERPL-MCNC: 1.6 MG/DL — SIGNIFICANT CHANGE UP (ref 1.6–2.6)
MCHC RBC-ENTMCNC: 27.8 PG — SIGNIFICANT CHANGE UP (ref 27–34)
MCHC RBC-ENTMCNC: 33.2 GM/DL — SIGNIFICANT CHANGE UP (ref 32–36)
MCV RBC AUTO: 83.8 FL — SIGNIFICANT CHANGE UP (ref 80–100)
METHOD TYPE: SIGNIFICANT CHANGE UP
NRBC # BLD: 0 /100 WBCS — SIGNIFICANT CHANGE UP (ref 0–0)
NRBC # FLD: 0 K/UL — SIGNIFICANT CHANGE UP (ref 0–0)
ORGANISM # SPEC MICROSCOPIC CNT: ABNORMAL
ORGANISM # SPEC MICROSCOPIC CNT: ABNORMAL
PHOSPHATE SERPL-MCNC: 2.4 MG/DL — LOW (ref 2.5–4.5)
PHOSPHATE SERPL-MCNC: 2.6 MG/DL — SIGNIFICANT CHANGE UP (ref 2.5–4.5)
PLATELET # BLD AUTO: 127 K/UL — LOW (ref 150–400)
POTASSIUM SERPL-MCNC: 3.3 MMOL/L — LOW (ref 3.5–5.3)
POTASSIUM SERPL-MCNC: 3.6 MMOL/L — SIGNIFICANT CHANGE UP (ref 3.5–5.3)
POTASSIUM SERPL-SCNC: 3.3 MMOL/L — LOW (ref 3.5–5.3)
POTASSIUM SERPL-SCNC: 3.6 MMOL/L — SIGNIFICANT CHANGE UP (ref 3.5–5.3)
RBC # BLD: 3.34 M/UL — LOW (ref 3.8–5.2)
RBC # FLD: 21.5 % — HIGH (ref 10.3–14.5)
SODIUM SERPL-SCNC: 147 MMOL/L — HIGH (ref 135–145)
SODIUM SERPL-SCNC: 147 MMOL/L — HIGH (ref 135–145)
SPECIMEN SOURCE: SIGNIFICANT CHANGE UP
WBC # BLD: 6.43 K/UL — SIGNIFICANT CHANGE UP (ref 3.8–10.5)
WBC # FLD AUTO: 6.43 K/UL — SIGNIFICANT CHANGE UP (ref 3.8–10.5)

## 2024-02-01 PROCEDURE — 99221 1ST HOSP IP/OBS SF/LOW 40: CPT

## 2024-02-01 PROCEDURE — 99233 SBSQ HOSP IP/OBS HIGH 50: CPT | Mod: 25

## 2024-02-01 PROCEDURE — 99497 ADVNCD CARE PLAN 30 MIN: CPT | Mod: 25

## 2024-02-01 PROCEDURE — 99233 SBSQ HOSP IP/OBS HIGH 50: CPT

## 2024-02-01 PROCEDURE — 93306 TTE W/DOPPLER COMPLETE: CPT | Mod: 26

## 2024-02-01 RX ORDER — ENOXAPARIN SODIUM 100 MG/ML
40 INJECTION SUBCUTANEOUS EVERY 24 HOURS
Refills: 0 | Status: DISCONTINUED | OUTPATIENT
Start: 2024-02-01 | End: 2024-02-14

## 2024-02-01 RX ORDER — POTASSIUM PHOSPHATE, MONOBASIC POTASSIUM PHOSPHATE, DIBASIC 236; 224 MG/ML; MG/ML
15 INJECTION, SOLUTION INTRAVENOUS ONCE
Refills: 0 | Status: COMPLETED | OUTPATIENT
Start: 2024-02-01 | End: 2024-02-01

## 2024-02-01 RX ORDER — SODIUM CHLORIDE 9 MG/ML
1000 INJECTION, SOLUTION INTRAVENOUS
Refills: 0 | Status: DISCONTINUED | OUTPATIENT
Start: 2024-02-01 | End: 2024-02-02

## 2024-02-01 RX ORDER — MAGNESIUM SULFATE 500 MG/ML
1 VIAL (ML) INJECTION ONCE
Refills: 0 | Status: DISCONTINUED | OUTPATIENT
Start: 2024-02-01 | End: 2024-02-01

## 2024-02-01 RX ORDER — SODIUM,POTASSIUM PHOSPHATES 278-250MG
1 POWDER IN PACKET (EA) ORAL ONCE
Refills: 0 | Status: DISCONTINUED | OUTPATIENT
Start: 2024-02-01 | End: 2024-02-01

## 2024-02-01 RX ORDER — POTASSIUM CHLORIDE 20 MEQ
40 PACKET (EA) ORAL ONCE
Refills: 0 | Status: COMPLETED | OUTPATIENT
Start: 2024-02-01 | End: 2024-02-01

## 2024-02-01 RX ORDER — MAGNESIUM SULFATE 500 MG/ML
2 VIAL (ML) INJECTION ONCE
Refills: 0 | Status: COMPLETED | OUTPATIENT
Start: 2024-02-01 | End: 2024-02-01

## 2024-02-01 RX ADMIN — CARBIDOPA AND LEVODOPA 1 TABLET(S): 25; 100 TABLET ORAL at 22:07

## 2024-02-01 RX ADMIN — Medication 1 APPLICATION(S): at 17:38

## 2024-02-01 RX ADMIN — SODIUM CHLORIDE 100 MILLILITER(S): 9 INJECTION, SOLUTION INTRAVENOUS at 17:40

## 2024-02-01 RX ADMIN — SENNA PLUS 2 TABLET(S): 8.6 TABLET ORAL at 21:24

## 2024-02-01 RX ADMIN — Medication 1 APPLICATION(S): at 05:30

## 2024-02-01 RX ADMIN — CEFTRIAXONE 100 MILLIGRAM(S): 500 INJECTION, POWDER, FOR SOLUTION INTRAMUSCULAR; INTRAVENOUS at 10:35

## 2024-02-01 RX ADMIN — ENOXAPARIN SODIUM 40 MILLIGRAM(S): 100 INJECTION SUBCUTANEOUS at 21:23

## 2024-02-01 RX ADMIN — CARBIDOPA AND LEVODOPA 1.5 TABLET(S): 25; 100 TABLET ORAL at 22:06

## 2024-02-01 RX ADMIN — POTASSIUM PHOSPHATE, MONOBASIC POTASSIUM PHOSPHATE, DIBASIC 62.5 MILLIMOLE(S): 236; 224 INJECTION, SOLUTION INTRAVENOUS at 11:46

## 2024-02-01 RX ADMIN — Medication 2: at 17:38

## 2024-02-01 RX ADMIN — INSULIN GLARGINE 5 UNIT(S): 100 INJECTION, SOLUTION SUBCUTANEOUS at 21:37

## 2024-02-01 NOTE — PROGRESS NOTE ADULT - ASSESSMENT
Patient Nadja Ramirez is a 78 yo F PMH IDDM, HTN, HLD, OP, Parkinson's, GCA, peripheral neuropathy, diabetic retinopathy w/ reduced vision, BIBEMS from [facility]. Per daughter, patient has been at [facility] and LKW per daughter was 1/9/24 on patient's birthday when family went to celebrate. At that time patient was able to walk independently, but generally weak. Talking, eating regularly and making jokes. Then patient's other daughter Jennifer would call and check in daily after that. They noted a progressive decline in her where she was less interactive, decreased appetite, speaking less. This was notable Saturday 1/27/24 per Margareth by the other daughter Jennifer . Then they went to see her and saw her with concerns of failure to thrive. Labwork was performed and resulting abnormalities caused her to be transferred to [facility] for further workup. Here UA positive, CT A/P concerning for acute emphysematous cystitis. Neuro consulted for NPH initially. Then overnight CT angio head and neck performed with findings below. On multiple re-evaluation, patient has improved clinical status, more interactive and following more commands, speaking more words though hypophonic. Moving extremities symmetrically. Still not back to baseline however. Improving on abx.     LKW: 1/9/24  NIHSS: 14   Not a tenecteplase candidate due to outside window  Not a thrombectomy candidate due to no large vessel occlusion    Impression:   1) Depressed level of consciousness concerning for diffuse brain dysfunction likely secondary to metabolic and infectious encephalopathy found with moderate to severe intracranial atherosclerotic disease most notable in the vertebrobasilar system. From neurovascular standpoint findings are likely asymptomatic but as precaution can pursue inpatient MRI brain though if continues to improve and nonfocal from new neurologic symptoms otherwise, obtaining MRI should not hold up discharge.  2) Given multiple metabolic derangements and infectious etiologies, it would be difficult to evaluate for NPH in the inpatient setting. Can pursue NPH w/u in outpatient setting after her acute issues are resolved.    Recommendations:  #Stroke management  [ ] Recommend from neurovascular perspective to start aspirin 81mg daily if not otherwise acutely medically contraindicated  [ ] Atorvastatin 40-80 mg daily titrated per LDL < 70  [ ] HgbA1C 7.5, LDL 65   [ ] MRI brain w/o con, can order inpatient but should not hold up discharge. If all other management is completed and patient is ready for discharge, can obtain repeat CT head w/o con if MRI is not pursued inpatient.   [ ] TTE and ILR, inpatient or outpatient   [ ] can obtain opthalmology eval given b/l reduced vision, suspect intraocular pathology rather than intracranial pathology causing her visual symptoms. Consider wound care for lower back ulcer that daughter reports.  [ ] telemetry to check for arrhythmia, EKG while here    - Glucose control    - Neuro-checks and VS q4h  - Gradual normotension  - Dysphagia screen. If fails, speech/swallow eval  - aspiration, fall precautions  - STAT CT head non-contrast for change in neuro exam and notify neurology  - PT/ OT / DVT ppx per primary team (okay from our end for chemical DVT ppx)    #NPH recommendations:  [ ] B12, TSH, folate  [ ] Infectious w/u per primary team  [ ] Refer to outpatient Neurology for NPH diagnostic evaluation, where patient can have proper assessment, if work up is indicated after clinical improvement from current illness.   Patient can follow-up with any of the following: Dr. Smooth Andrade MD. [address], [place]. [phone]. Dr. Liza Mccain MD. [address], [place]. [phone].      Discussed patient care with patient's daughter Margareth 1/30 and 1/31 and 2/1 and other daughter Jennifer over phone 1/30 and in agreement. Delay in note entry/ note updates is due to patient care. Patient seen on rounds with Dr Libman and in agreement with recommendations above. Non-neurologic findings on imaging work up per primary team if applicable.  Reviewed relevant neurologic imaging and findings with family 1/30, 1/31, 2/1 and primary team. Also discussed with medicine attending 2/1 again.  Patient Nadja Ramirez is a 80 yo F PMH IDDM, HTN, HLD, OP, Parkinson's, GCA, peripheral neuropathy, diabetic retinopathy w/ reduced vision, BIBEMS from [facility]. Per daughter, patient has been at [facility] and LKW per daughter was 1/9/24 on patient's birthday when family went to celebrate. At that time patient was able to walk independently, but generally weak. Talking, eating regularly and making jokes. Then patient's other daughter Jennifer would call and check in daily after that. They noted a progressive decline in her where she was less interactive, decreased appetite, speaking less. This was notable Saturday 1/27/24 per Margareth by the other daughter Jennifer . Then they went to see her and saw her with concerns of failure to thrive. Labwork was performed and resulting abnormalities caused her to be transferred to [facility] for further workup. Here UA positive, CT A/P concerning for acute emphysematous cystitis. Neuro consulted for NPH initially. Then overnight CT angio head and neck performed with findings below. On multiple re-evaluation, patient has improved clinical status, more interactive and following more commands, speaking more words though hypophonic. Moving extremities symmetrically. Still not back to baseline however. Improving on abx.     LKW: 1/9/24  NIHSS: 14   Not a tenecteplase candidate due to outside window  Not a thrombectomy candidate due to no large vessel occlusion    Impression:   1) Depressed level of consciousness concerning for diffuse brain dysfunction likely secondary to metabolic and infectious encephalopathy found with moderate to severe intracranial atherosclerotic disease most notable in the vertebrobasilar system. From neurovascular standpoint findings are likely asymptomatic but as precaution can pursue MRI brain though if continues to improve and nonfocal from new neurologic symptoms otherwise, obtaining MRI should not hold up discharge. Currently clinically improving with treatment of infection.  2) Given multiple metabolic derangements and infectious etiologies, it would be difficult to evaluate for NPH in the inpatient setting. Can pursue NPH w/u in outpatient setting after her acute issues are resolved.    Recommendations:  #Stroke concern management  [ ] Recommend from neurovascular perspective to start aspirin 81mg daily if not otherwise acutely medically contraindicated  [ ] Atorvastatin 40-80 mg daily titrated per LDL < 70  [ ] HgbA1C 7.5, LDL 65   [ ] MRI brain w/o con, can order inpatient but should not hold up discharge. If all other management is completed and patient is ready for discharge, can obtain repeat CT head w/o con if MRI is not pursued inpatient.   [ ] TTE and ILR, inpatient or outpatient   [ ] can obtain opthalmology eval given b/l reduced vision, suspect intraocular pathology rather than intracranial pathology causing her visual symptoms. Consider wound care for lower back ulcer that daughter reports.  [ ] telemetry to check for arrhythmia, EKG while here    - Glucose control    - Neuro-checks and VS q4h  - Gradual normotension  - Dysphagia screen. If fails, speech/swallow eval  - aspiration, fall precautions  - STAT CT head non-contrast for change in neuro exam and notify neurology  - PT/ OT / DVT ppx per primary team (okay from our end for chemical DVT ppx)    #NPH recommendations:  [ ] B12, TSH, folate  [ ] Infectious w/u per primary team  [ ] Refer to outpatient Neurology for NPH diagnostic evaluation, where patient can have proper assessment, if work up is indicated after clinical improvement from current illness.   Patient can follow-up with any of the following: Dr. Smooth Andrade MD. [address], [place]. [phone]. Dr. Liza Mccain MD. [address], [place]. [phone].      Discussed patient care with patient's daughter Margareth 1/30 and 1/31 and 2/1 and other daughter Jennifer over phone 1/30 and in agreement. Delay in note entry/ note updates is due to patient care. Patient seen on rounds with Dr Libman and in agreement with recommendations above. Non-neurologic findings on imaging work up per primary team if applicable.  Reviewed relevant neurologic imaging and findings with family 1/30, 1/31, 2/1 and primary team. Also discussed with medicine attending 2/1 again.  Patient Nadja Ramirez is a 78 yo F PMH IDDM, HTN, HLD, OP, Parkinson's, GCA, peripheral neuropathy, diabetic retinopathy w/ reduced vision, BIBEMS from [facility]. Per daughter, patient has been at [facility] and LKW per daughter was 1/9/24 on patient's birthday when family went to celebrate. At that time patient was able to walk independently, but generally weak. Talking, eating regularly and making jokes. Then patient's other daughter Jennifer would call and check in daily after that. They noted a progressive decline in her where she was less interactive, decreased appetite, speaking less. This was notable Saturday 1/27/24 per Margareth by the other daughter Jennifer . Then they went to see her and saw her with concerns of failure to thrive. Labwork was performed and resulting abnormalities caused her to be transferred to [facility] for further workup. Here UA positive, CT A/P concerning for acute emphysematous cystitis. Neuro consulted for NPH initially. Then overnight CT angio head and neck performed with findings below. On multiple re-evaluation, patient has improved clinical status, more interactive and following more commands, speaking more words though hypophonic. Moving extremities symmetrically. Still not back to baseline however. Improving on abx.     LKW: 1/9/24  NIHSS: 14   Not a tenecteplase candidate due to outside window  Not a thrombectomy candidate due to no large vessel occlusion    Impression:   1) Depressed level of consciousness concerning for diffuse brain dysfunction likely secondary to metabolic and infectious encephalopathy found with moderate to severe intracranial atherosclerotic disease most notable in the vertebrobasilar system. From neurovascular standpoint findings are likely asymptomatic but as precaution can pursue MRI brain though if continues to improve and nonfocal from new neurologic symptoms otherwise, obtaining MRI should not hold up discharge. Currently clinically improving with treatment of infection.  2) Given multiple metabolic derangements and infectious etiologies, it would be difficult to evaluate for NPH in the inpatient setting. Can pursue NPH w/u in outpatient setting after her acute issues are resolved.    Recommendations:  #Stroke concern management  [ ] Recommend from neurovascular perspective to start aspirin 81mg daily if not otherwise acutely medically contraindicated  [ ] Atorvastatin 40-80 mg daily titrated per LDL < 70 or can continue fenofibrate that the patient is on.  [ ] HgbA1C 7.5, LDL 65   [ ] MRI brain w/o con, can order inpatient but should not hold up discharge. If all other management is completed and patient is ready for discharge, can obtain repeat CT head w/o con if MRI is not pursued inpatient.   [ ] TTE and ILR, inpatient or outpatient   [ ] can obtain opthalmology eval given b/l reduced vision, suspect intraocular pathology rather than intracranial pathology causing her visual symptoms. Consider wound care for lower back ulcer that daughter reports.  [ ] telemetry to check for arrhythmia, EKG while here    - Glucose control    - Neuro-checks and VS q4h  - Gradual normotension  - Dysphagia screen. If fails, speech/swallow eval  - aspiration, fall precautions  - STAT CT head non-contrast for change in neuro exam and notify neurology  - PT/ OT / DVT ppx per primary team (okay from our end for chemical DVT ppx)    #NPH recommendations:  [ ] B12, TSH, folate  [ ] Infectious w/u per primary team  [ ] Refer to outpatient Neurology for NPH diagnostic evaluation, where patient can have proper assessment, if work up is indicated after clinical improvement from current illness.   Patient can follow-up with any of the following: Dr. Smooth Andrade MD. [address], [place]. [phone]. Dr. Liza Mccain MD. [address], [place]. [phone].      Discussed patient care with patient's daughter Margareth 1/30 and 1/31 and 2/1 and other daughter Jennifer over phone 1/30 and in agreement. Delay in note entry/ note updates is due to patient care. Patient seen on rounds with Dr Libman and in agreement with recommendations above. Non-neurologic findings on imaging work up per primary team if applicable.  Reviewed relevant neurologic imaging and findings with family 1/30, 1/31, 2/1 and primary team. Also discussed with medicine attending 2/1 again.

## 2024-02-01 NOTE — DIETITIAN INITIAL EVALUATION ADULT - REASON FOR ADMISSION
Acute cystitis without hematuria    Patient is an 79y Female with PMH diabetes, Parkinson's Disease, HTN, HLD, osteoporosis who presented from [facility] to [facility] status post fall and altered mental status.

## 2024-02-01 NOTE — DIETITIAN INITIAL EVALUATION ADULT - OTHER INFO
Patient was NPO this AM. SLP evaluation attempted 2/1, patient did not participate and diet was deferred to medical team. Per chart review, noted goals of care hospitalist documentation "pt is alert but refuses to eat. discussed with patient and daughter, who wants to try pureed diet, Understands aspiration risk, wants to hold off ng tube rn." Diet has now been advanced to pureed textures, further observation required to assess adequacy of PO intake. No report of GI distress (nausea, vomiting, diarrhea, constipation). No BMs noted per RN flowsheet documentation. Noted to be on a bowel regimen. Noted supplementation with calcium carbonate + vitamin D, ferrous sulfate, and folic acid per review of medication list. Noted HbA1c 7.5% (1/31). Patient likely at risk given significant weight loss noted above; unable to diagnose at the time of this writing due to lack of PO intake history / nutrition-focused physical exam. Nutrition will continue to follow and re-assess as per protocol.

## 2024-02-01 NOTE — DIETITIAN INITIAL EVALUATION ADULT - ORAL INTAKE PTA/DIET HISTORY
Noted the following information from transfer sheets: No known food allergies or food intolerances. Height 62inches, weight 115lb. No diet order information. Writer attempted phone call to ISHAN ALEMAN ([phone]) to obtain further information; no answer.    Writer receiving bedside care from interdisciplinary team members upon writer's multiple attempts to visit, unable to meet at this time    Per Aneta MOTLEY, noted the following weight history: 50.6kg (1/31), 57.6kg (11/22), 61.6kg (10/26)  Objective weight measurements suggest 11kg (18%) weight loss x3 months period of time (significant)

## 2024-02-01 NOTE — DIETITIAN INITIAL EVALUATION ADULT - PROBLEM SELECTOR PLAN 9
Patient with residual vision loss since last hospitalization at [facility]  Continue home prednisone, methotrexate

## 2024-02-01 NOTE — DIETITIAN INITIAL EVALUATION ADULT - REASON INDICATOR FOR ASSESSMENT
Nutrition Consult for Assessment Nutrition Consult for Pressure Injury Stage 2 or greater, MST score 2 or greater, and Assessment

## 2024-02-01 NOTE — PROGRESS NOTE ADULT - PROBLEM SELECTOR PLAN 10
IDDM2  Home regimen: glargine 15u qHS, admelog 7u TID qAC  hold lispro given poor po  cont lantus to 5u   on D51/2 NS for hypernatremia  Monitor FSG, maintain FITZ, hypoglycemia protocol

## 2024-02-01 NOTE — CONSULT NOTE ADULT - ASSESSMENT
Assessment and Recommendations:  80 yo F PMH IDDM, HTN, HLD, OP, Parkinson's, GCA brought in by ems for being found down after a fall in the nursing home. Patient not able to provide much hx, however chart review down with access to [facility] medical record. Patient was recently admitted and treated with IV Solu-Medrol and trial of IVIG for biopsy confirmed giant cell arteritis. Per last ophthalmology note patient was hand motion in both eyes with non-reactive pupils. Patient was discharged in this state on 1/3/2024.     #Vision loss OU   - Patients last documented vision was Hand motion in both eyes as of 12/18/2023.   - Now appears No light perception (NLP) in both eyes, pupils non-reactive  - Ocular exam otherwise within normal limits.   -Patient unable to describe when vision decreased, so would treat for GCA flare given lack of other evidence with 1g of solumedrol and rheum consult.   - Patient altered mental status could be 2/2 to systemic GCA  - CRP elevated; ESR pending  - Ophtho will follow    Seen and discussed with Dr. Sales  Will discuss with Dr. regan    Outpatient Follow-up: Patient should follow-up with his/her ophthalmologist or with [facility] Department of Ophthalmology within 1 week of after discharge at:    [address]  [place] [address]  [phone]    Brien Russ MD, PGY-3  Also available on [employer] Teams

## 2024-02-01 NOTE — DIETITIAN INITIAL EVALUATION ADULT - ADD RECOMMEND
1. Defer nutrition plan of care to medical team based on goals of care discussion and decisions of patient/patient's family  --> If alternate means of nutrition are to be initiated, please reconsult nutrition for recommendations   2. Monitor weights, labs, BM's, skin integrity, p.o. intake.   3. Please monitor % PO intake on flowsheets

## 2024-02-01 NOTE — CONSULT NOTE ADULT - ATTENDING COMMENTS
78 yo F PMH IDDM, HTN, HLD, OP, Parkinson's, GCA brought in by ems for being found down after a fall in the nursing home. Patient not able to provide much hx, however chart review down with access to [facility] medical record. Patient was recently admitted and treated with IV Solu-Medrol and trial of IVIG for biopsy confirmed giant cell arteritis. Per last ophthalmology note patient was hand motion in both eyes with non-reactive pupils. Patient was discharged in this state on 1/3/2024.       Consulted for "bitemporal hemianopsia" but patient looks to be No Light Perception in both eyes. Pupils minimally reactive. No evidence of pallid disc swelling. Pt with altered mental status unable to give us a clear history. Old notes from 1/3/24 shows that she was Hand Motion OU. Biopsy + GCA. Start 1g solumedrol STAT and consult rheumatology. ?Stroke 2/2 GCA. f/u ESR and CRP. Ophtho will follow. Unreliable historian but GCA ROS + for ?scalp tenderness. 80 yo F with biopsy proven GCA brought in after a fall. +AMS. Patient with No Light Perception vision in both eyes (altered but does not blink to threat, does not blink to light. Pupils minimally reactive. No evidence of pallid disc swelling today.   Of note, pt was hospitalized in [place] with fall and was found to have bilateral vision loss OU in dec 2023 and biopsy showing +GCA on that admission. Patient was discharged on a prednisone taper.     Please Start 1g solumedrol STAT and consult rheumatology for management of GCA. ?Stroke w AMS 2/2 GCA. f/u ESR. CRP elevated. Ophtho will follow. Unreliable historian but GCA ROS + for ?scalp tenderness.

## 2024-02-01 NOTE — CHART NOTE - NSCHARTNOTEFT_GEN_A_CORE
reviewed optho recommendation. ordered one dose of 1g solumedrol for cf GCA flare. will obtain rheum evaluation in the am.

## 2024-02-01 NOTE — PROGRESS NOTE ADULT - SUBJECTIVE AND OBJECTIVE BOX
[facility] Department of Hospital Medicine    Patient is a 79y old  Female who presents with a chief complaint of ams, fall (31 Jan 2024 17:46)    OVERNIGHT EVENTS: No acute events overnight    SUBJECTIVE: VSS. afebrile. improved mentation today. ANO2.     MEDICATIONS  (STANDING):  aspirin enteric coated 81 milliGRAM(s) Oral daily  atorvastatin 40 milliGRAM(s) Oral at bedtime  calcium carbonate 1250 mG  + Vitamin D (OsCal 500 + D) 1 Tablet(s) Oral daily  carbidopa/levodopa  25/100 1.5 Tablet(s) Oral three times a day  carbidopa/levodopa CR 25/100 1 Tablet(s) Oral at bedtime  cefTRIAXone   IVPB 1000 milliGRAM(s) IV Intermittent every 24 hours  dextrose 5% + sodium chloride 0.45%. 1000 milliLiter(s) (100 mL/Hr) IV Continuous <Continuous>  dextrose 5% + sodium chloride 0.45%. 1000 milliLiter(s) (50 mL/Hr) IV Continuous <Continuous>  dextrose 5%. 1000 milliLiter(s) (50 mL/Hr) IV Continuous <Continuous>  dextrose 50% Injectable 12.5 Gram(s) IV Push once  dextrose 50% Injectable 25 Gram(s) IV Push once  fenofibrate Tablet 145 milliGRAM(s) Oral daily  ferrous    sulfate Liquid 220 milliGRAM(s) Oral daily  folic acid 1 milliGRAM(s) Oral daily  glucagon  Injectable 1 milliGRAM(s) IntraMuscular once  insulin glargine Injectable (LANTUS) 5 Unit(s) SubCutaneous at bedtime  insulin lispro (ADMELOG) corrective regimen sliding scale   SubCutaneous three times a day before meals  metoprolol succinate ER 50 milliGRAM(s) Oral daily  mirtazapine 7.5 milliGRAM(s) Oral daily  pantoprazole    Tablet 40 milliGRAM(s) Oral before breakfast  polyethylene glycol 3350 17 Gram(s) Oral daily  predniSONE   Tablet 20 milliGRAM(s) Oral daily  senna 2 Tablet(s) Oral at bedtime  silver sulfADIAZINE 1% Cream 1 Application(s) Topical two times a day    MEDICATIONS  (PRN):  acetaminophen     Tablet .. 650 milliGRAM(s) Oral every 6 hours PRN Temp greater or equal to 38C (100.4F), Mild Pain (1 - 3)  dextrose Oral Gel 15 Gram(s) Oral once PRN Blood Glucose LESS THAN 70 milliGRAM(s)/deciliter      CAPILLARY BLOOD GLUCOSE      POCT Blood Glucose.: 157 mg/dL (01 Feb 2024 12:06)  POCT Blood Glucose.: 124 mg/dL (01 Feb 2024 05:50)  POCT Blood Glucose.: 110 mg/dL (01 Feb 2024 00:46)  POCT Blood Glucose.: 100 mg/dL (31 Jan 2024 22:01)  POCT Blood Glucose.: 76 mg/dL (31 Jan 2024 17:17)    I&O's Summary    31 Jan 2024 07:01  -  01 Feb 2024 07:00  --------------------------------------------------------  IN: 0 mL / OUT: 550 mL / NET: -550 mL        PHYSICAL EXAM:  Vital Signs Last 24 Hrs  T(C): 36.8 (01 Feb 2024 08:39), Max: 36.8 (31 Jan 2024 15:55)  T(F): 98.3 (01 Feb 2024 08:39), Max: 98.3 (31 Jan 2024 15:55)  HR: 75 (01 Feb 2024 08:39) (75 - 86)  BP: 127/69 (01 Feb 2024 08:39) (125/70 - 133/61)  BP(mean): 83 (01 Feb 2024 08:39) (83 - 83)  RR: 18 (01 Feb 2024 08:39) (17 - 18)  SpO2: 98% (01 Feb 2024 08:39) (98% - 100%)    Parameters below as of 01 Feb 2024 08:39  Patient On (Oxygen Delivery Method): room air        CONSTITUTIONAL: NAD, well-developed  HEAD: Normocephalic, atraumatic  EYES: EOMI, PERRL  ENT: no rhinorrhea, no pharyngeal erythema  RESPIRATORY: No increased work of breathing, CTAB, no wheezes or crackles appreciated  CARDIOVASCULAR: RRR, S1 and S2 present, no m/r/g  ABDOMEN: soft, NT, ND, bowel sounds present  EXTREMITIES: No LE edema  MUSCULOSKELETAL: no joint swelling, no tenderness to palpation  NEURO: A&Ox3, moving all extremities    LABS:                        9.3    6.43  )-----------( 127      ( 01 Feb 2024 05:51 )             28.0     02-01    147<H>  |  114<H>  |  15  ----------------------------<  120<H>  3.6   |  22  |  0.34<L>    Ca    8.1<L>      01 Feb 2024 05:51  Phos  2.4     02-01  Mg     1.60     02-01    TPro  x   /  Alb  2.8<L>  /  TBili  x   /  DBili  x   /  AST  x   /  ALT  x   /  AlkPhos  x   02-01          Urinalysis Basic - ( 01 Feb 2024 05:51 )    Color: x / Appearance: x / SG: x / pH: x  Gluc: 120 mg/dL / Ketone: x  / Bili: x / Urobili: x   Blood: x / Protein: x / Nitrite: x   Leuk Esterase: x / RBC: x / WBC x   Sq Epi: x / Non Sq Epi: x / Bacteria: x        Culture - Urine (collected 30 Jan 2024 08:02)  Source: Clean Catch Clean Catch (Midstream)  Preliminary Report (01 Feb 2024 00:45):    >100,000 CFU/ml Escherichia coli        RADIOLOGY & ADDITIONAL TESTS:    Results Reviewed:   Imaging Personally Reviewed:  Electrocardiogram Personally Reviewed:    COORDINATION OF CARE:  Care Discussed with Consultants/Other Providers [Y/N]:  Prior or Outpatient Records Reviewed [Y/N]:   [facility] Department of Hospital Medicine    Patient is a 79y old  Female who presents with a chief complaint of ams, fall (31 Jan 2024 17:46)    OVERNIGHT EVENTS: No acute events overnight    SUBJECTIVE: VSS. afebrile. improved mentation today. ANO2.   alert but refused to participate with S/S eval- pt is alert but refuses to eat. discussed with patient and daughter, who wants to try pureed diet, Understands aspiration risk, wants to hold off ng tube rn.      MEDICATIONS  (STANDING):  aspirin enteric coated 81 milliGRAM(s) Oral daily  atorvastatin 40 milliGRAM(s) Oral at bedtime  calcium carbonate 1250 mG  + Vitamin D (OsCal 500 + D) 1 Tablet(s) Oral daily  carbidopa/levodopa  25/100 1.5 Tablet(s) Oral three times a day  carbidopa/levodopa CR 25/100 1 Tablet(s) Oral at bedtime  cefTRIAXone   IVPB 1000 milliGRAM(s) IV Intermittent every 24 hours  dextrose 5% + sodium chloride 0.45%. 1000 milliLiter(s) (100 mL/Hr) IV Continuous <Continuous>  dextrose 5% + sodium chloride 0.45%. 1000 milliLiter(s) (50 mL/Hr) IV Continuous <Continuous>  dextrose 5%. 1000 milliLiter(s) (50 mL/Hr) IV Continuous <Continuous>  dextrose 50% Injectable 12.5 Gram(s) IV Push once  dextrose 50% Injectable 25 Gram(s) IV Push once  fenofibrate Tablet 145 milliGRAM(s) Oral daily  ferrous    sulfate Liquid 220 milliGRAM(s) Oral daily  folic acid 1 milliGRAM(s) Oral daily  glucagon  Injectable 1 milliGRAM(s) IntraMuscular once  insulin glargine Injectable (LANTUS) 5 Unit(s) SubCutaneous at bedtime  insulin lispro (ADMELOG) corrective regimen sliding scale   SubCutaneous three times a day before meals  metoprolol succinate ER 50 milliGRAM(s) Oral daily  mirtazapine 7.5 milliGRAM(s) Oral daily  pantoprazole    Tablet 40 milliGRAM(s) Oral before breakfast  polyethylene glycol 3350 17 Gram(s) Oral daily  predniSONE   Tablet 20 milliGRAM(s) Oral daily  senna 2 Tablet(s) Oral at bedtime  silver sulfADIAZINE 1% Cream 1 Application(s) Topical two times a day    MEDICATIONS  (PRN):  acetaminophen     Tablet .. 650 milliGRAM(s) Oral every 6 hours PRN Temp greater or equal to 38C (100.4F), Mild Pain (1 - 3)  dextrose Oral Gel 15 Gram(s) Oral once PRN Blood Glucose LESS THAN 70 milliGRAM(s)/deciliter      CAPILLARY BLOOD GLUCOSE      POCT Blood Glucose.: 157 mg/dL (01 Feb 2024 12:06)  POCT Blood Glucose.: 124 mg/dL (01 Feb 2024 05:50)  POCT Blood Glucose.: 110 mg/dL (01 Feb 2024 00:46)  POCT Blood Glucose.: 100 mg/dL (31 Jan 2024 22:01)  POCT Blood Glucose.: 76 mg/dL (31 Jan 2024 17:17)    I&O's Summary    31 Jan 2024 07:01  -  01 Feb 2024 07:00  --------------------------------------------------------  IN: 0 mL / OUT: 550 mL / NET: -550 mL        PHYSICAL EXAM:  Vital Signs Last 24 Hrs  T(C): 36.8 (01 Feb 2024 08:39), Max: 36.8 (31 Jan 2024 15:55)  T(F): 98.3 (01 Feb 2024 08:39), Max: 98.3 (31 Jan 2024 15:55)  HR: 75 (01 Feb 2024 08:39) (75 - 86)  BP: 127/69 (01 Feb 2024 08:39) (125/70 - 133/61)  BP(mean): 83 (01 Feb 2024 08:39) (83 - 83)  RR: 18 (01 Feb 2024 08:39) (17 - 18)  SpO2: 98% (01 Feb 2024 08:39) (98% - 100%)    Parameters below as of 01 Feb 2024 08:39  Patient On (Oxygen Delivery Method): room air        CONSTITUTIONAL: NAD  RESPIRATORY: No increased work of breathing, CTAB, no wheezes or crackles appreciated  CARDIOVASCULAR: RRR, S1 and S2 present, no m/r/g  ABDOMEN: soft, NT, ND, bowel sounds present  EXTREMITIES: No LE edema  MUSCULOSKELETAL: no joint swelling, no tenderness to palpation  NEURO: A&Ox2, moving all extremities   segura with clear urine    LABS:                        9.3    6.43  )-----------( 127      ( 01 Feb 2024 05:51 )             28.0     02-01    147<H>  |  114<H>  |  15  ----------------------------<  120<H>  3.6   |  22  |  0.34<L>    Ca    8.1<L>      01 Feb 2024 05:51  Phos  2.4     02-01  Mg     1.60     02-01    TPro  x   /  Alb  2.8<L>  /  TBili  x   /  DBili  x   /  AST  x   /  ALT  x   /  AlkPhos  x   02-01          Urinalysis Basic - ( 01 Feb 2024 05:51 )    Color: x / Appearance: x / SG: x / pH: x  Gluc: 120 mg/dL / Ketone: x  / Bili: x / Urobili: x   Blood: x / Protein: x / Nitrite: x   Leuk Esterase: x / RBC: x / WBC x   Sq Epi: x / Non Sq Epi: x / Bacteria: x        Culture - Urine (collected 30 Jan 2024 08:02)  Source: Clean Catch Clean Catch (Midstream)  Preliminary Report (01 Feb 2024 00:45):    >100,000 CFU/ml Escherichia coli        RADIOLOGY & ADDITIONAL TESTS:    Results Reviewed:   Imaging Personally Reviewed:  Electrocardiogram Personally Reviewed:    COORDINATION OF CARE:  Care Discussed with Consultants/Other Providers [Y/N]:  Prior or Outpatient Records Reviewed [Y/N]:

## 2024-02-01 NOTE — DIETITIAN INITIAL EVALUATION ADULT - NSFNSPHYEXAMSKINFT_GEN_A_CORE
Left buttocks stage II pressure injury, coccyx stage II pressure injury, sacrum stage II pressure injury per RN flowsheet

## 2024-02-01 NOTE — PROGRESS NOTE ADULT - SUBJECTIVE AND OBJECTIVE BOX
Neurology Progress Note    SUBJECTIVE/OBJECTIVE/INTERVAL EVENTS: Patient seen and examined at bedside w/ neuro attending Dr Libman and daughter bedside. Daughter reports she is doing better, eyes more open, talking more and louder than prior day. Tried eating some apple sauce in AM. Reported no new neurologic symptoms.      MEDICATIONS  (STANDING):  aspirin enteric coated 81 milliGRAM(s) Oral daily  atorvastatin 40 milliGRAM(s) Oral at bedtime  calcium carbonate 1250 mG  + Vitamin D (OsCal 500 + D) 1 Tablet(s) Oral daily  carbidopa/levodopa  25/100 1.5 Tablet(s) Oral three times a day  carbidopa/levodopa CR 25/100 1 Tablet(s) Oral at bedtime  cefTRIAXone   IVPB 1000 milliGRAM(s) IV Intermittent every 24 hours  dextrose 5% + sodium chloride 0.45%. 1000 milliLiter(s) (100 mL/Hr) IV Continuous <Continuous>  dextrose 5% + sodium chloride 0.45%. 1000 milliLiter(s) (50 mL/Hr) IV Continuous <Continuous>  dextrose 5%. 1000 milliLiter(s) (50 mL/Hr) IV Continuous <Continuous>  dextrose 50% Injectable 12.5 Gram(s) IV Push once  dextrose 50% Injectable 25 Gram(s) IV Push once  fenofibrate Tablet 145 milliGRAM(s) Oral daily  ferrous    sulfate Liquid 220 milliGRAM(s) Oral daily  folic acid 1 milliGRAM(s) Oral daily  glucagon  Injectable 1 milliGRAM(s) IntraMuscular once  insulin glargine Injectable (LANTUS) 5 Unit(s) SubCutaneous at bedtime  insulin lispro (ADMELOG) corrective regimen sliding scale   SubCutaneous three times a day before meals  metoprolol succinate ER 50 milliGRAM(s) Oral daily  mirtazapine 7.5 milliGRAM(s) Oral daily  pantoprazole    Tablet 40 milliGRAM(s) Oral before breakfast  polyethylene glycol 3350 17 Gram(s) Oral daily  potassium phosphate IVPB 15 milliMole(s) IV Intermittent once  predniSONE   Tablet 20 milliGRAM(s) Oral daily  senna 2 Tablet(s) Oral at bedtime  silver sulfADIAZINE 1% Cream 1 Application(s) Topical two times a day    MEDICATIONS  (PRN):  acetaminophen     Tablet .. 650 milliGRAM(s) Oral every 6 hours PRN Temp greater or equal to 38C (100.4F), Mild Pain (1 - 3)  dextrose Oral Gel 15 Gram(s) Oral once PRN Blood Glucose LESS THAN 70 milliGRAM(s)/deciliter      VITALS & EXAMINATION:  Vital Signs Last 24 Hrs  T(C): 36.8 (01 Feb 2024 08:39), Max: 36.8 (31 Jan 2024 15:55)  T(F): 98.3 (01 Feb 2024 08:39), Max: 98.3 (31 Jan 2024 15:55)  HR: 75 (01 Feb 2024 08:39) (75 - 86)  BP: 127/69 (01 Feb 2024 08:39) (125/70 - 133/61)  BP(mean): 83 (01 Feb 2024 08:39) (83 - 83)  RR: 18 (01 Feb 2024 08:39) (17 - 18)  SpO2: 98% (01 Feb 2024 08:39) (98% - 100%)    Parameters below as of 01 Feb 2024 08:39  Patient On (Oxygen Delivery Method): room air    General:  Constitutional: Female, appears stated age, eyes spontaneously opens, does not appear in pain   Head: Normocephalic;   Eyes: clear sclera;   Resp: breathing comfortably  Neck: no nuchal rigidity  Fundoscopic exam: unable to visualize due to opaque lens b/l     Neurological (>12):  MS: Eyes spontaneously open. Able to state correctly her age, hospital, year. Said month Jan. Able to show two fingers, look towards left and right when asked.    Language: Speech is hypophonic, speaks more words at a time. Able to repeat brief statements and make small jokes.  CNs: Pupils round opaque, nonreactive b/l. Does not readily blink to threat b/l eyes. Unable to count fingers b/l eyes regardless of quadrants. EOMI. No disconjugate gaze. Slight L nasolabial fold flattening. Tongue midline and can protrude.     Motor - Reduced bulk.  Moves upper extremities b/l antigravity 4+ proximally (shoulder) and 4+ distally (). Not following for biceps/triceps exam. Does not raise either leg antigravity but able to move slightly in plane of bed equally. Appears symmetric upper and lower extremities.     Sensation: Intact to noxious stimuli b/l x4 extremities  Reflexes L/R:  Biceps(C5) 2/2  BR(C6) 1/1   Triceps(C7)  2/2 Patellar(L4)   1/1   Ankles 0/0   Toes: downgoing b/l  No ankle clonus  Neg ortiz reflex b/l  Coordination/ Gait: cannot assess given clinical status      LABORATORY:  CBC                       9.3    6.43  )-----------( 127      ( 01 Feb 2024 05:51 )             28.0     Chem 02-01    147<H>  |  114<H>  |  15  ----------------------------<  120<H>  3.6   |  22  |  0.34<L>    Ca    8.1<L>      01 Feb 2024 05:51  Phos  2.4     02-01  Mg     1.60     02-01    TPro  x   /  Alb  2.8<L>  /  TBili  x   /  DBili  x   /  AST  x   /  ALT  x   /  AlkPhos  x   02-01    LFTs LIVER FUNCTIONS - ( 01 Feb 2024 05:51 )  Alb: 2.8 g/dL / Pro: x     / ALK PHOS: x     / ALT: x     / AST: x     / GGT: x           Coagulopathy   Lipid Panel 01-31 Chol 152 LDL -- HDL 48<L> Trig 195<H>  A1c   Cardiac enzymes     U/A Urinalysis Basic - ( 01 Feb 2024 05:51 )    Color: x / Appearance: x / SG: x / pH: x  Gluc: 120 mg/dL / Ketone: x  / Bili: x / Urobili: x   Blood: x / Protein: x / Nitrite: x   Leuk Esterase: x / RBC: x / WBC x   Sq Epi: x / Non Sq Epi: x / Bacteria: x      CSF  Immunological  Other    STUDIES & IMAGING: (EEG, CT, MR, U/S, TTE/MARIANO):        < from: CT Head No Cont (01.30.24 @ 06:23) >    ACC: [id number] EXAM:  CT BRAIN   ORDERED BY: JASON CALDERON     PROCEDURE DATE:  01/30/2024          INTERPRETATION:  CLINICAL INFORMATION: Altered mental status..    TECHNIQUE: Serial axial images were obtained from the skull base to the   vertex without intravenous contrast.  Coronal and sagittal reformations   were obtained.    COMPARISON: No similar prior studies available for comparison.    FINDINGS:    There is no acute intracranial hemorrhage or mass effect.    Mild hydrocephalus involving the lateral and third ventricle with overall   normal appearing fourth ventricle. Callosal angle at the level the   anterior commissure is 76 degrees, suspicious for normal pressure   hydrocephalus.    Gray-white matter differentiation is preserved. Minimal areas of white   matter hypodensity are seen likely representing microvascular-type   changes.    The calvarium is intact.    The visualized portions of the paranasal sinuses and mastoid air cells   are clear.    IMPRESSION:    No acute intracranial hemorrhage.    Mild hydrocephalus involving the lateral and third ventricle with overall   normal-appearing fourth ventricle. Narrowed callosal angle at the level   the anterior commissure suspicious for normal pressure hydrocephalus.    --- End of Report ---    CHANCE BRIAN MD; Resident Radiologist  This document has been electronically signed.  BARB MCFARLAND MD; Attending Radiologist  This document has been electronically signed. Jan 30 2024  6:44AM    < end of copied text >    < from: CT Angio Neck w/ IV Cont (01.30.24 @ 20:02) >    ACC: [id number] EXAM:  CT ANGIO NECK (W)AW IC   ORDERED BY: RONNY LOPEZ     ACC: [id number] EXAM:  CT ANGIO BRAIN (W)AW IC   ORDERED BY: RONNY LOPEZ     PROCEDURE DATE:  01/30/2024          INTERPRETATION:  Two examinations were performed on this patient:  1. CT Angiography of the carotid arteries with IV contrast (and without,   if performed)  2. CT Angiography of the intracranial circulation with IV contrast (and   without, if performed)    CLINICAL INFORMATION:    CVA/STROKE   AMS  LCT  Admitting Dxs: N30.00   N30.00  found unresponsive    TECHNIQUE (both examinations):   Initial noncontrast CT was obtained to   the head.  CT angiography images at 1 mm thickness were acquired from the   skull base to the skull vertex as a single helical acquisition.   Images   were acquired during rapid bolus intravenous administration.  This data   set was reconstructed sagittal and coronal images.  3D MIP reconstruction   images were obtained.    Post processing angiographic reconstruction of   images was performed. This data set was  reconstructed  and reviewed in   multiple projections to evaluate carotid morphology and intracranial   vessels.   The magnitude of stenosis was evaluated based on the diameter   of an intact distal of the internal carotid artery using NASCET criteria.   Images were repeated through the head following contrast administration.  CONTRAST:    90 cc administered  ;  10 cc discarded  DOSE INFORMATION:   This scan was performed using automatic exposure   control (radiation dose reduction software) to obtain a diagnostic image   quality scan with patient dose as low as reasonably achievable.   Total   DLP for this examination is estimated at 1767 mGy-cm.    COMPARISON:    CT head earlier same date    FINDINGS:    CAROTID CIRCULATION    The thoracic aortic arch appears intact.  The great vessel origins remain   patent.    The right carotid circulation demonstrates an intact common carotid   artery.  The carotid bulb demonstrate mild calcified and noncalcified   atherosclerotic plaque along its medial wall without hemodynamically   significant stenosis.  The internal carotid is patent to the skull base.    The left carotid circulation demonstrates an intact common carotid   artery.  The carotid bulb demonstrates mild calcified and noncalcified   atherosclerotic plaque along its posterior wall without hemodynamically   significant stenosis.  The internal carotid is patent to the skull base.    The vertebral arteries are patent.  The degree of vertebral asymmetry is   within physiologic limits. The dominant caliber left vertebral artery   demonstrates mild luminal narrowing in its preforaminal  V1 segment at   the C7-T1 level, less than 50%. The small caliber left vertebral artery   demonstrates segmental low-grade stenosis and fusiform aneurysmal   dilatation in its distal foraminal P2 segment, with narrowing greatest at   the C3 level in the range of 50%.    INTRACRANIAL CIRCULATION    The ANTERIOR circulation demonstrates intact inflow from theascending   cervical segment to the petrous segment of each internal carotid artery.   The cavernous and clinoid segments demonstrate calcified and noncalcified   atherosclerotic plaque. This causes luminal irregularity and stenoses   that appear greatest in the clinoid segment on the right but remain less   than 50%.   The ophthalmic arteries are demonstrated as patent vessels on   each side.    The anterior cerebral arteries are patent and asymmetric,   the dominant caliber A1 segment on the left, smaller caliber on the   right.  An anterior communicating artery is present. The anterior   cerebral arterial A2 segments are patent to peripheral branching. The   right middle cerebral artery demonstrates an intact initial M1 segment   and patent peripheral anterior and posterior division sylvian branches.    The left middle cerebral artery demonstrates an intact initial M1 segment   and patent peripheral anterior and posterior division sylvian branches.    The POSTERIOR circulation demonstrates patent inflow from each vertebral   artery.   The smaller caliber right vertebral artery attenuates at the   level of the dural crossing. It has irregular stenoses up to its PICA   origin, only minimally intermittently seen distal to this location to the   vascular formation. The larger caliber left vertebral artery demonstrates   segmental narrowing up to 50% in its extracranial segment.    PICA   arteries are patent on each side.  The basilar artery is severely   attenuated throughout its middle segment with multilevel focal areas of   nonvisualization. Improved flow is noted at its most distal aspect,   likely reversed. Superior cerebellar arteries are demonstrated, likely   perfused by a reversed flow.  There is fetal origin of each posterior   cerebral artery from the ipsilateral internal carotid artery (typically   incidental developmental variant). Small caliber P1 segment is seen on   the right, not convincingly demonstrated on the left   Each posterior   cerebral artery is patent to peripheral branching.    The principal dural venous sinuses are patent.  This includes the   superior sagittal sinus anterior and posterior limbs.  Each transverse   sinus is patent to sigmoid sinuses and patent jugular veins.    No intracranial aneurysm or vascular malformation is identified.    BRAIN:   No hemorrhage mass or infarction is identified. The brain again   demonstrate mild diminished attenuation in the deep cerebral hemispheric   white matter that suggest ischemic white matter disease and could include   transependymal resorption of CSF. CSF spaces remain dilated with   differential dilatation of the lateral and third ventricles with respect   to the sulci and fourth ventricle. The cerebral aqueduct appears patent.   Following contrast administration no abnormal brain parenchymal   enhancement is found.    ADDITIONAL FINDINGS:  None.      IMPRESSION:    1.   Right carotid system:    Atherosclerotic plaque carotid bulb without    hemodynamically significant stenosis.    2.   Left carotid system:     Atherosclerotic plaque carotid bulb without    hemodynamically significant stenosis.    3.   Vertebral circulation:    Patent. Segmental narrowings and fusiform   aneurysmal dilatation right vertebral artery distal foraminal V2 segment,   nonspecific, possibly atherosclerotic. Low-grade narrowing proximal   preforaminal V1 segment left vertebral artery is nonspecific, possibly   atherosclerotic.    4.  Anterior intracranial circulation:     Intracranial atherosclerosis   cavernous and clinoid segments of the internal carotid arteries, at least   mild to moderate on the right, lower greater on the left.    5.  Posterior intracranial circulation:    Severe vertebral basilar   attenuation reflects both developmentally small caliber and superimposed   high-grade multifocal stenoses, likely atherosclerotic, involving each   vertebral artery and the basilar artery. Likely reversal of flow in the   distal basilar artery to perfuse the superior cerebellar arteries    6.  Brain:   No infarct is identified.      Preliminary report provided. At the time of preliminary interpretation   d/w David BAILEY @ 505am on 1/31/24 with readback confirmation    --- End of Report ---    NATANAEL WALTERS MD; Attending Radiologist  This document has been electronically signed. Jan 31 2024  9:45AM    < end of copied text >   Neurology Progress Note    SUBJECTIVE/OBJECTIVE/INTERVAL EVENTS: Patient seen and examined at bedside w/ neuro attending Dr Libman and daughter bedside. Daughter reports she is doing better, eyes more open, talking more and louder than prior day. Tried eating some apple sauce in AM. Reported no new neurologic symptoms. She per daughter is doing overall better than prior day.     MEDICATIONS  (STANDING):  aspirin enteric coated 81 milliGRAM(s) Oral daily  atorvastatin 40 milliGRAM(s) Oral at bedtime  calcium carbonate 1250 mG  + Vitamin D (OsCal 500 + D) 1 Tablet(s) Oral daily  carbidopa/levodopa  25/100 1.5 Tablet(s) Oral three times a day  carbidopa/levodopa CR 25/100 1 Tablet(s) Oral at bedtime  cefTRIAXone   IVPB 1000 milliGRAM(s) IV Intermittent every 24 hours  dextrose 5% + sodium chloride 0.45%. 1000 milliLiter(s) (100 mL/Hr) IV Continuous <Continuous>  dextrose 5% + sodium chloride 0.45%. 1000 milliLiter(s) (50 mL/Hr) IV Continuous <Continuous>  dextrose 5%. 1000 milliLiter(s) (50 mL/Hr) IV Continuous <Continuous>  dextrose 50% Injectable 12.5 Gram(s) IV Push once  dextrose 50% Injectable 25 Gram(s) IV Push once  fenofibrate Tablet 145 milliGRAM(s) Oral daily  ferrous    sulfate Liquid 220 milliGRAM(s) Oral daily  folic acid 1 milliGRAM(s) Oral daily  glucagon  Injectable 1 milliGRAM(s) IntraMuscular once  insulin glargine Injectable (LANTUS) 5 Unit(s) SubCutaneous at bedtime  insulin lispro (ADMELOG) corrective regimen sliding scale   SubCutaneous three times a day before meals  metoprolol succinate ER 50 milliGRAM(s) Oral daily  mirtazapine 7.5 milliGRAM(s) Oral daily  pantoprazole    Tablet 40 milliGRAM(s) Oral before breakfast  polyethylene glycol 3350 17 Gram(s) Oral daily  potassium phosphate IVPB 15 milliMole(s) IV Intermittent once  predniSONE   Tablet 20 milliGRAM(s) Oral daily  senna 2 Tablet(s) Oral at bedtime  silver sulfADIAZINE 1% Cream 1 Application(s) Topical two times a day    MEDICATIONS  (PRN):  acetaminophen     Tablet .. 650 milliGRAM(s) Oral every 6 hours PRN Temp greater or equal to 38C (100.4F), Mild Pain (1 - 3)  dextrose Oral Gel 15 Gram(s) Oral once PRN Blood Glucose LESS THAN 70 milliGRAM(s)/deciliter      VITALS & EXAMINATION:  Vital Signs Last 24 Hrs  T(C): 36.8 (01 Feb 2024 08:39), Max: 36.8 (31 Jan 2024 15:55)  T(F): 98.3 (01 Feb 2024 08:39), Max: 98.3 (31 Jan 2024 15:55)  HR: 75 (01 Feb 2024 08:39) (75 - 86)  BP: 127/69 (01 Feb 2024 08:39) (125/70 - 133/61)  BP(mean): 83 (01 Feb 2024 08:39) (83 - 83)  RR: 18 (01 Feb 2024 08:39) (17 - 18)  SpO2: 98% (01 Feb 2024 08:39) (98% - 100%)    Parameters below as of 01 Feb 2024 08:39  Patient On (Oxygen Delivery Method): room air    General:  Constitutional: Female, appears stated age, eyes spontaneously opens, does not appear in pain   Head: Normocephalic;   Eyes: clear sclera;   Resp: breathing comfortably  Neck: no nuchal rigidity  Fundoscopic exam: unable to visualize due to opaque lens b/l     Neurological (>12):  MS: Eyes spontaneously open. Able to state correctly her age, hospital, year. Said month Jan. Able to show two fingers, look towards left and right when asked.    Language: Speech is hypophonic, speaks more words at a time. Able to repeat brief statements and make small jokes.  CNs: Pupils round opaque, nonreactive b/l. Does not readily blink to threat b/l eyes. Unable to count fingers b/l eyes regardless of quadrants. EOMI. No disconjugate gaze. Slight L nasolabial fold flattening. Tongue midline and can protrude.     Motor - Reduced bulk.  Moves upper extremities b/l antigravity 4+ proximally (shoulder) and 4+ distally (). Not following for biceps/triceps exam. Does not raise either leg antigravity but able to move slightly in plane of bed equally. Appears symmetric upper and lower extremities.     Sensation: Intact to noxious stimuli b/l x4 extremities  Reflexes L/R:  Biceps(C5) 2/2  BR(C6) 1/1   Triceps(C7)  2/2 Patellar(L4)   1/1   Ankles 0/0   Toes: downgoing b/l  No ankle clonus  Neg ortiz reflex b/l  Coordination/ Gait: cannot assess given clinical status      LABORATORY:  CBC                       9.3    6.43  )-----------( 127      ( 01 Feb 2024 05:51 )             28.0     Chem 02-01    147<H>  |  114<H>  |  15  ----------------------------<  120<H>  3.6   |  22  |  0.34<L>    Ca    8.1<L>      01 Feb 2024 05:51  Phos  2.4     02-01  Mg     1.60     02-01    TPro  x   /  Alb  2.8<L>  /  TBili  x   /  DBili  x   /  AST  x   /  ALT  x   /  AlkPhos  x   02-01    LFTs LIVER FUNCTIONS - ( 01 Feb 2024 05:51 )  Alb: 2.8 g/dL / Pro: x     / ALK PHOS: x     / ALT: x     / AST: x     / GGT: x           Coagulopathy   Lipid Panel 01-31 Chol 152 LDL -- HDL 48<L> Trig 195<H>  A1c   Cardiac enzymes     U/A Urinalysis Basic - ( 01 Feb 2024 05:51 )    Color: x / Appearance: x / SG: x / pH: x  Gluc: 120 mg/dL / Ketone: x  / Bili: x / Urobili: x   Blood: x / Protein: x / Nitrite: x   Leuk Esterase: x / RBC: x / WBC x   Sq Epi: x / Non Sq Epi: x / Bacteria: x      CSF  Immunological  Other    STUDIES & IMAGING: (EEG, CT, MR, U/S, TTE/MARIANO):        < from: CT Head No Cont (01.30.24 @ 06:23) >    ACC: [id number] EXAM:  CT BRAIN   ORDERED BY: JASON CALDERON     PROCEDURE DATE:  01/30/2024          INTERPRETATION:  CLINICAL INFORMATION: Altered mental status..    TECHNIQUE: Serial axial images were obtained from the skull base to the   vertex without intravenous contrast.  Coronal and sagittal reformations   were obtained.    COMPARISON: No similar prior studies available for comparison.    FINDINGS:    There is no acute intracranial hemorrhage or mass effect.    Mild hydrocephalus involving the lateral and third ventricle with overall   normal appearing fourth ventricle. Callosal angle at the level the   anterior commissure is 76 degrees, suspicious for normal pressure   hydrocephalus.    Gray-white matter differentiation is preserved. Minimal areas of white   matter hypodensity are seen likely representing microvascular-type   changes.    The calvarium is intact.    The visualized portions of the paranasal sinuses and mastoid air cells   are clear.    IMPRESSION:    No acute intracranial hemorrhage.    Mild hydrocephalus involving the lateral and third ventricle with overall   normal-appearing fourth ventricle. Narrowed callosal angle at the level   the anterior commissure suspicious for normal pressure hydrocephalus.    --- End of Report ---    CHANCE BRIAN MD; Resident Radiologist  This document has been electronically signed.  BARB MCFARLAND MD; Attending Radiologist  This document has been electronically signed. Jan 30 2024  6:44AM    < end of copied text >    < from: CT Angio Neck w/ IV Cont (01.30.24 @ 20:02) >    ACC: [id number] EXAM:  CT ANGIO NECK (W)AW IC   ORDERED BY: RONNY LOPEZ     ACC: [id number] EXAM:  CT ANGIO BRAIN (W)AW IC   ORDERED BY: RONNY LOPEZ     PROCEDURE DATE:  01/30/2024          INTERPRETATION:  Two examinations were performed on this patient:  1. CT Angiography of the carotid arteries with IV contrast (and without,   if performed)  2. CT Angiography of the intracranial circulation with IV contrast (and   without, if performed)    CLINICAL INFORMATION:    CVA/STROKE   AMS  LCT  Admitting Dxs: N30.00   N30.00  found unresponsive    TECHNIQUE (both examinations):   Initial noncontrast CT was obtained to   the head.  CT angiography images at 1 mm thickness were acquired from the   skull base to the skull vertex as a single helical acquisition.   Images   were acquired during rapid bolus intravenous administration.  This data   set was reconstructed sagittal and coronal images.  3D MIP reconstruction   images were obtained.    Post processing angiographic reconstruction of   images was performed. This data set was  reconstructed  and reviewed in   multiple projections to evaluate carotid morphology and intracranial   vessels.   The magnitude of stenosis was evaluated based on the diameter   of an intact distal of the internal carotid artery using NASCET criteria.   Images were repeated through the head following contrast administration.  CONTRAST:    90 cc administered  ;  10 cc discarded  DOSE INFORMATION:   This scan was performed using automatic exposure   control (radiation dose reduction software) to obtain a diagnostic image   quality scan with patient dose as low as reasonably achievable.   Total   DLP for this examination is estimated at 1767 mGy-cm.    COMPARISON:    CT head earlier same date    FINDINGS:    CAROTID CIRCULATION    The thoracic aortic arch appears intact.  The great vessel origins remain   patent.    The right carotid circulation demonstrates an intact common carotid   artery.  The carotid bulb demonstrate mild calcified and noncalcified   atherosclerotic plaque along its medial wall without hemodynamically   significant stenosis.  The internal carotid is patent to the skull base.    The left carotid circulation demonstrates an intact common carotid   artery.  The carotid bulb demonstrates mild calcified and noncalcified   atherosclerotic plaque along its posterior wall without hemodynamically   significant stenosis.  The internal carotid is patent to the skull base.    The vertebral arteries are patent.  The degree of vertebral asymmetry is   within physiologic limits. The dominant caliber left vertebral artery   demonstrates mild luminal narrowing in its preforaminal  V1 segment at   the C7-T1 level, less than 50%. The small caliber left vertebral artery   demonstrates segmental low-grade stenosis and fusiform aneurysmal   dilatation in its distal foraminal P2 segment, with narrowing greatest at   the C3 level in the range of 50%.    INTRACRANIAL CIRCULATION    The ANTERIOR circulation demonstrates intact inflow from theascending   cervical segment to the petrous segment of each internal carotid artery.   The cavernous and clinoid segments demonstrate calcified and noncalcified   atherosclerotic plaque. This causes luminal irregularity and stenoses   that appear greatest in the clinoid segment on the right but remain less   than 50%.   The ophthalmic arteries are demonstrated as patent vessels on   each side.    The anterior cerebral arteries are patent and asymmetric,   the dominant caliber A1 segment on the left, smaller caliber on the   right.  An anterior communicating artery is present. The anterior   cerebral arterial A2 segments are patent to peripheral branching. The   right middle cerebral artery demonstrates an intact initial M1 segment   and patent peripheral anterior and posterior division sylvian branches.    The left middle cerebral artery demonstrates an intact initial M1 segment   and patent peripheral anterior and posterior division sylvian branches.    The POSTERIOR circulation demonstrates patent inflow from each vertebral   artery.   The smaller caliber right vertebral artery attenuates at the   level of the dural crossing. It has irregular stenoses up to its PICA   origin, only minimally intermittently seen distal to this location to the   vascular formation. The larger caliber left vertebral artery demonstrates   segmental narrowing up to 50% in its extracranial segment.    PICA   arteries are patent on each side.  The basilar artery is severely   attenuated throughout its middle segment with multilevel focal areas of   nonvisualization. Improved flow is noted at its most distal aspect,   likely reversed. Superior cerebellar arteries are demonstrated, likely   perfused by a reversed flow.  There is fetal origin of each posterior   cerebral artery from the ipsilateral internal carotid artery (typically   incidental developmental variant). Small caliber P1 segment is seen on   the right, not convincingly demonstrated on the left   Each posterior   cerebral artery is patent to peripheral branching.    The principal dural venous sinuses are patent.  This includes the   superior sagittal sinus anterior and posterior limbs.  Each transverse   sinus is patent to sigmoid sinuses and patent jugular veins.    No intracranial aneurysm or vascular malformation is identified.    BRAIN:   No hemorrhage mass or infarction is identified. The brain again   demonstrate mild diminished attenuation in the deep cerebral hemispheric   white matter that suggest ischemic white matter disease and could include   transependymal resorption of CSF. CSF spaces remain dilated with   differential dilatation of the lateral and third ventricles with respect   to the sulci and fourth ventricle. The cerebral aqueduct appears patent.   Following contrast administration no abnormal brain parenchymal   enhancement is found.    ADDITIONAL FINDINGS:  None.      IMPRESSION:    1.   Right carotid system:    Atherosclerotic plaque carotid bulb without    hemodynamically significant stenosis.    2.   Left carotid system:     Atherosclerotic plaque carotid bulb without    hemodynamically significant stenosis.    3.   Vertebral circulation:    Patent. Segmental narrowings and fusiform   aneurysmal dilatation right vertebral artery distal foraminal V2 segment,   nonspecific, possibly atherosclerotic. Low-grade narrowing proximal   preforaminal V1 segment left vertebral artery is nonspecific, possibly   atherosclerotic.    4.  Anterior intracranial circulation:     Intracranial atherosclerosis   cavernous and clinoid segments of the internal carotid arteries, at least   mild to moderate on the right, lower greater on the left.    5.  Posterior intracranial circulation:    Severe vertebral basilar   attenuation reflects both developmentally small caliber and superimposed   high-grade multifocal stenoses, likely atherosclerotic, involving each   vertebral artery and the basilar artery. Likely reversal of flow in the   distal basilar artery to perfuse the superior cerebellar arteries    6.  Brain:   No infarct is identified.      Preliminary report provided. At the time of preliminary interpretation   d/w David BAILEY @ 505am on 1/31/24 with readback confirmation    --- End of Report ---    NATANAEL WALTERS MD; Attending Radiologist  This document has been electronically signed. Jan 31 2024  9:45AM    < end of copied text >   Neurology Progress Note    SUBJECTIVE/OBJECTIVE/INTERVAL EVENTS: Patient seen and examined at bedside w/ neuro attending Dr Libman and daughter bedside. Daughter reports she is doing better, eyes more open, talking more and louder than prior day. Tried eating some apple sauce in AM. Reported no new neurologic symptoms. She per daughter is doing overall better than prior day.     MEDICATIONS  (STANDING):  aspirin enteric coated 81 milliGRAM(s) Oral daily  atorvastatin 40 milliGRAM(s) Oral at bedtime  calcium carbonate 1250 mG  + Vitamin D (OsCal 500 + D) 1 Tablet(s) Oral daily  carbidopa/levodopa  25/100 1.5 Tablet(s) Oral three times a day  carbidopa/levodopa CR 25/100 1 Tablet(s) Oral at bedtime  cefTRIAXone   IVPB 1000 milliGRAM(s) IV Intermittent every 24 hours  dextrose 5% + sodium chloride 0.45%. 1000 milliLiter(s) (100 mL/Hr) IV Continuous <Continuous>  dextrose 5% + sodium chloride 0.45%. 1000 milliLiter(s) (50 mL/Hr) IV Continuous <Continuous>  dextrose 5%. 1000 milliLiter(s) (50 mL/Hr) IV Continuous <Continuous>  dextrose 50% Injectable 12.5 Gram(s) IV Push once  dextrose 50% Injectable 25 Gram(s) IV Push once  fenofibrate Tablet 145 milliGRAM(s) Oral daily  ferrous    sulfate Liquid 220 milliGRAM(s) Oral daily  folic acid 1 milliGRAM(s) Oral daily  glucagon  Injectable 1 milliGRAM(s) IntraMuscular once  insulin glargine Injectable (LANTUS) 5 Unit(s) SubCutaneous at bedtime  insulin lispro (ADMELOG) corrective regimen sliding scale   SubCutaneous three times a day before meals  metoprolol succinate ER 50 milliGRAM(s) Oral daily  mirtazapine 7.5 milliGRAM(s) Oral daily  pantoprazole    Tablet 40 milliGRAM(s) Oral before breakfast  polyethylene glycol 3350 17 Gram(s) Oral daily  potassium phosphate IVPB 15 milliMole(s) IV Intermittent once  predniSONE   Tablet 20 milliGRAM(s) Oral daily  senna 2 Tablet(s) Oral at bedtime  silver sulfADIAZINE 1% Cream 1 Application(s) Topical two times a day    MEDICATIONS  (PRN):  acetaminophen     Tablet .. 650 milliGRAM(s) Oral every 6 hours PRN Temp greater or equal to 38C (100.4F), Mild Pain (1 - 3)  dextrose Oral Gel 15 Gram(s) Oral once PRN Blood Glucose LESS THAN 70 milliGRAM(s)/deciliter      VITALS & EXAMINATION:  Vital Signs Last 24 Hrs  T(C): 36.8 (01 Feb 2024 08:39), Max: 36.8 (31 Jan 2024 15:55)  T(F): 98.3 (01 Feb 2024 08:39), Max: 98.3 (31 Jan 2024 15:55)  HR: 75 (01 Feb 2024 08:39) (75 - 86)  BP: 127/69 (01 Feb 2024 08:39) (125/70 - 133/61)  BP(mean): 83 (01 Feb 2024 08:39) (83 - 83)  RR: 18 (01 Feb 2024 08:39) (17 - 18)  SpO2: 98% (01 Feb 2024 08:39) (98% - 100%)    Parameters below as of 01 Feb 2024 08:39  Patient On (Oxygen Delivery Method): room air    General:  Constitutional: Female, appears stated age, eyes spontaneously opens, does not appear in pain   Head: Normocephalic;   Eyes: clear sclera;   Resp: breathing comfortably  Neck: no nuchal rigidity  Fundoscopic exam: unable to visualize due to opaque lens b/l     Neurological (>12):  MS: Eyes spontaneously open. Able to state correctly her age, hospital, year. Said month Jan. Able to show two fingers, look towards left and right when asked.    Language: Speech is mild-moderately hypophonic, speaks more words at a time. Able to repeat brief statements and make small jokes.  CNs: Pupils round opaque, nonreactive b/l. Does not readily blink to threat b/l eyes. Unable to count fingers b/l eyes regardless of quadrants. EOMI. No disconjugate gaze. Slight L nasolabial fold flattening. Tongue midline and can protrude.     Motor - Reduced bulk.  Moves upper extremities b/l antigravity 4+ proximally (shoulder) and 4+ distally (). Not following for biceps/triceps exam. Does not raise either leg antigravity but able to move slightly in plane of bed equally. Appears symmetric upper and lower extremities.     Sensation: Intact to noxious stimuli b/l x4 extremities  Reflexes L/R:  Biceps(C5) 2/2  BR(C6) 1/1   Triceps(C7)  2/2 Patellar(L4)   1/1   Ankles 0/0   Toes: downgoing b/l  No ankle clonus  Neg ortiz reflex b/l  Coordination/ Gait: cannot assess given clinical status      LABORATORY:  CBC                       9.3    6.43  )-----------( 127      ( 01 Feb 2024 05:51 )             28.0     Chem 02-01    147<H>  |  114<H>  |  15  ----------------------------<  120<H>  3.6   |  22  |  0.34<L>    Ca    8.1<L>      01 Feb 2024 05:51  Phos  2.4     02-01  Mg     1.60     02-01    TPro  x   /  Alb  2.8<L>  /  TBili  x   /  DBili  x   /  AST  x   /  ALT  x   /  AlkPhos  x   02-01    LFTs LIVER FUNCTIONS - ( 01 Feb 2024 05:51 )  Alb: 2.8 g/dL / Pro: x     / ALK PHOS: x     / ALT: x     / AST: x     / GGT: x           Coagulopathy   Lipid Panel 01-31 Chol 152 LDL -- HDL 48<L> Trig 195<H>  A1c   Cardiac enzymes     U/A Urinalysis Basic - ( 01 Feb 2024 05:51 )    Color: x / Appearance: x / SG: x / pH: x  Gluc: 120 mg/dL / Ketone: x  / Bili: x / Urobili: x   Blood: x / Protein: x / Nitrite: x   Leuk Esterase: x / RBC: x / WBC x   Sq Epi: x / Non Sq Epi: x / Bacteria: x      CSF  Immunological  Other    STUDIES & IMAGING: (EEG, CT, MR, U/S, TTE/MARIANO):        < from: CT Head No Cont (01.30.24 @ 06:23) >    ACC: [id number] EXAM:  CT BRAIN   ORDERED BY: JASON CALDERON     PROCEDURE DATE:  01/30/2024          INTERPRETATION:  CLINICAL INFORMATION: Altered mental status..    TECHNIQUE: Serial axial images were obtained from the skull base to the   vertex without intravenous contrast.  Coronal and sagittal reformations   were obtained.    COMPARISON: No similar prior studies available for comparison.    FINDINGS:    There is no acute intracranial hemorrhage or mass effect.    Mild hydrocephalus involving the lateral and third ventricle with overall   normal appearing fourth ventricle. Callosal angle at the level the   anterior commissure is 76 degrees, suspicious for normal pressure   hydrocephalus.    Gray-white matter differentiation is preserved. Minimal areas of white   matter hypodensity are seen likely representing microvascular-type   changes.    The calvarium is intact.    The visualized portions of the paranasal sinuses and mastoid air cells   are clear.    IMPRESSION:    No acute intracranial hemorrhage.    Mild hydrocephalus involving the lateral and third ventricle with overall   normal-appearing fourth ventricle. Narrowed callosal angle at the level   the anterior commissure suspicious for normal pressure hydrocephalus.    --- End of Report ---    CHANCE BRIAN MD; Resident Radiologist  This document has been electronically signed.  BARB MCFARLAND MD; Attending Radiologist  This document has been electronically signed. Jan 30 2024  6:44AM    < end of copied text >    < from: CT Angio Neck w/ IV Cont (01.30.24 @ 20:02) >    ACC: [id number] EXAM:  CT ANGIO NECK (W)AW IC   ORDERED BY: RONNY LOPEZ     ACC: [id number] EXAM:  CT ANGIO BRAIN (W)AW IC   ORDERED BY: RONNY LOPEZ     PROCEDURE DATE:  01/30/2024          INTERPRETATION:  Two examinations were performed on this patient:  1. CT Angiography of the carotid arteries with IV contrast (and without,   if performed)  2. CT Angiography of the intracranial circulation with IV contrast (and   without, if performed)    CLINICAL INFORMATION:    CVA/STROKE   AMS  LCT  Admitting Dxs: N30.00   N30.00  found unresponsive    TECHNIQUE (both examinations):   Initial noncontrast CT was obtained to   the head.  CT angiography images at 1 mm thickness were acquired from the   skull base to the skull vertex as a single helical acquisition.   Images   were acquired during rapid bolus intravenous administration.  This data   set was reconstructed sagittal and coronal images.  3D MIP reconstruction   images were obtained.    Post processing angiographic reconstruction of   images was performed. This data set was  reconstructed  and reviewed in   multiple projections to evaluate carotid morphology and intracranial   vessels.   The magnitude of stenosis was evaluated based on the diameter   of an intact distal of the internal carotid artery using NASCET criteria.   Images were repeated through the head following contrast administration.  CONTRAST:    90 cc administered  ;  10 cc discarded  DOSE INFORMATION:   This scan was performed using automatic exposure   control (radiation dose reduction software) to obtain a diagnostic image   quality scan with patient dose as low as reasonably achievable.   Total   DLP for this examination is estimated at 1767 mGy-cm.    COMPARISON:    CT head earlier same date    FINDINGS:    CAROTID CIRCULATION    The thoracic aortic arch appears intact.  The great vessel origins remain   patent.    The right carotid circulation demonstrates an intact common carotid   artery.  The carotid bulb demonstrate mild calcified and noncalcified   atherosclerotic plaque along its medial wall without hemodynamically   significant stenosis.  The internal carotid is patent to the skull base.    The left carotid circulation demonstrates an intact common carotid   artery.  The carotid bulb demonstrates mild calcified and noncalcified   atherosclerotic plaque along its posterior wall without hemodynamically   significant stenosis.  The internal carotid is patent to the skull base.    The vertebral arteries are patent.  The degree of vertebral asymmetry is   within physiologic limits. The dominant caliber left vertebral artery   demonstrates mild luminal narrowing in its preforaminal  V1 segment at   the C7-T1 level, less than 50%. The small caliber left vertebral artery   demonstrates segmental low-grade stenosis and fusiform aneurysmal   dilatation in its distal foraminal P2 segment, with narrowing greatest at   the C3 level in the range of 50%.    INTRACRANIAL CIRCULATION    The ANTERIOR circulation demonstrates intact inflow from theascending   cervical segment to the petrous segment of each internal carotid artery.   The cavernous and clinoid segments demonstrate calcified and noncalcified   atherosclerotic plaque. This causes luminal irregularity and stenoses   that appear greatest in the clinoid segment on the right but remain less   than 50%.   The ophthalmic arteries are demonstrated as patent vessels on   each side.    The anterior cerebral arteries are patent and asymmetric,   the dominant caliber A1 segment on the left, smaller caliber on the   right.  An anterior communicating artery is present. The anterior   cerebral arterial A2 segments are patent to peripheral branching. The   right middle cerebral artery demonstrates an intact initial M1 segment   and patent peripheral anterior and posterior division sylvian branches.    The left middle cerebral artery demonstrates an intact initial M1 segment   and patent peripheral anterior and posterior division sylvian branches.    The POSTERIOR circulation demonstrates patent inflow from each vertebral   artery.   The smaller caliber right vertebral artery attenuates at the   level of the dural crossing. It has irregular stenoses up to its PICA   origin, only minimally intermittently seen distal to this location to the   vascular formation. The larger caliber left vertebral artery demonstrates   segmental narrowing up to 50% in its extracranial segment.    PICA   arteries are patent on each side.  The basilar artery is severely   attenuated throughout its middle segment with multilevel focal areas of   nonvisualization. Improved flow is noted at its most distal aspect,   likely reversed. Superior cerebellar arteries are demonstrated, likely   perfused by a reversed flow.  There is fetal origin of each posterior   cerebral artery from the ipsilateral internal carotid artery (typically   incidental developmental variant). Small caliber P1 segment is seen on   the right, not convincingly demonstrated on the left   Each posterior   cerebral artery is patent to peripheral branching.    The principal dural venous sinuses are patent.  This includes the   superior sagittal sinus anterior and posterior limbs.  Each transverse   sinus is patent to sigmoid sinuses and patent jugular veins.    No intracranial aneurysm or vascular malformation is identified.    BRAIN:   No hemorrhage mass or infarction is identified. The brain again   demonstrate mild diminished attenuation in the deep cerebral hemispheric   white matter that suggest ischemic white matter disease and could include   transependymal resorption of CSF. CSF spaces remain dilated with   differential dilatation of the lateral and third ventricles with respect   to the sulci and fourth ventricle. The cerebral aqueduct appears patent.   Following contrast administration no abnormal brain parenchymal   enhancement is found.    ADDITIONAL FINDINGS:  None.      IMPRESSION:    1.   Right carotid system:    Atherosclerotic plaque carotid bulb without    hemodynamically significant stenosis.    2.   Left carotid system:     Atherosclerotic plaque carotid bulb without    hemodynamically significant stenosis.    3.   Vertebral circulation:    Patent. Segmental narrowings and fusiform   aneurysmal dilatation right vertebral artery distal foraminal V2 segment,   nonspecific, possibly atherosclerotic. Low-grade narrowing proximal   preforaminal V1 segment left vertebral artery is nonspecific, possibly   atherosclerotic.    4.  Anterior intracranial circulation:     Intracranial atherosclerosis   cavernous and clinoid segments of the internal carotid arteries, at least   mild to moderate on the right, lower greater on the left.    5.  Posterior intracranial circulation:    Severe vertebral basilar   attenuation reflects both developmentally small caliber and superimposed   high-grade multifocal stenoses, likely atherosclerotic, involving each   vertebral artery and the basilar artery. Likely reversal of flow in the   distal basilar artery to perfuse the superior cerebellar arteries    6.  Brain:   No infarct is identified.      Preliminary report provided. At the time of preliminary interpretation   d/w David BAILEY @ 505am on 1/31/24 with readback confirmation    --- End of Report ---    NATANAEL WALTERS MD; Attending Radiologist  This document has been electronically signed. Jan 31 2024  9:45AM    < end of copied text >

## 2024-02-01 NOTE — PATIENT PROFILE ADULT - FALL HARM RISK - HARM RISK INTERVENTIONS

## 2024-02-01 NOTE — CONSULT NOTE ADULT - SUBJECTIVE AND OBJECTIVE BOX
[facility] DEPARTMENT OF OPHTHALMOLOGY - INITIAL ADULT CONSULT  -----------------------------------------------------------------------------------------------------------------  Brien Russ MD  PGY 3  Contact on Teams  -----------------------------------------------------------------------------------------------------------------    HPI:  80 yo F PMH IDDM, HTN, HLD, OP, Parkinson's, GCA BIBEMS from [facility]. Per documentation, patient was found on the floor after a fall. No HS or LOC. Attempted to contacted [place] to obtain additional collateral. Of note, patient has had multiple falls recently. Patient is A&Ox0 so history obtained as per daughter Bina. Per daughter, patient is A&Ox3 at baseline and conversant. In the ED, patient is hemodynamically stable. CTH was negative for bleed or infarct but showed hydrocephalus of lateral and third ventricles and narrowed callosal angle suspicious for normal pressure hydrocephalus. CT A/P showed acute emphysematous cystitis. Fluid in the vagina with marked vaginal mucosal enhancement and questionable locules of luminal air inseparable from the adjacent emphysematous bladder wall. Cannot exclude vesicovaginal fistula. Labs significant gfor Na 150, K 3.0, WBC 15.4, troponin 70->54, ->147. UA in the ED +nitrites, moderate leukocyte esterase +protein, +ketones 4k WBC, 1.2k RBC, +glucose. Patient was treated with CTX, Zosyn, and 1L NS in the ED. (2024 16:38)    Interval History: Ophtho consulted for bitemporal vision loss and hx of GCA. Patient only partially able to answer questions with , but able to follow simple command for ocular exam.     PAST MEDICAL & SURGICAL HISTORY:  HTN (hypertension)  HLD (hyperlipidemia)  DM (diabetes mellitus)  CL (cholelithiasis)  Osteoporosis  Diabetic neuropathy  Diabetes  H/O Parkinson's disease  Hypertension  Giant cell arteritis  H/O left inguinal hernia repair  History of  section      Past Ocular History: GCA w  Ophthalmic Medications: ***  FAMILY HISTORY:  Family history of diabetes mellitus in mother    FH: dementia  mother    FH: HTN (hypertension)  mother    Family history of stomach cancer  sister    Family history of oral cancer  sister    Family history of depression  mother        MEDICATIONS  (STANDING):  aspirin enteric coated 81 milliGRAM(s) Oral daily  calcium carbonate 1250 mG  + Vitamin D (OsCal 500 + D) 1 Tablet(s) Oral daily  carbidopa/levodopa  25/100 1.5 Tablet(s) Oral three times a day  carbidopa/levodopa CR 25/100 1 Tablet(s) Oral at bedtime  cefTRIAXone   IVPB 1000 milliGRAM(s) IV Intermittent every 24 hours  dextrose 5% + sodium chloride 0.45%. 1000 milliLiter(s) (100 mL/Hr) IV Continuous <Continuous>  dextrose 5%. 1000 milliLiter(s) (50 mL/Hr) IV Continuous <Continuous>  dextrose 50% Injectable 12.5 Gram(s) IV Push once  dextrose 50% Injectable 25 Gram(s) IV Push once  enoxaparin Injectable 40 milliGRAM(s) SubCutaneous every 24 hours  fenofibrate Tablet 145 milliGRAM(s) Oral daily  ferrous    sulfate Liquid 220 milliGRAM(s) Oral daily  folic acid 1 milliGRAM(s) Oral daily  glucagon  Injectable 1 milliGRAM(s) IntraMuscular once  insulin glargine Injectable (LANTUS) 5 Unit(s) SubCutaneous at bedtime  insulin lispro (ADMELOG) corrective regimen sliding scale   SubCutaneous three times a day before meals  metoprolol succinate ER 50 milliGRAM(s) Oral daily  mirtazapine 7.5 milliGRAM(s) Oral daily  pantoprazole    Tablet 40 milliGRAM(s) Oral before breakfast  polyethylene glycol 3350 17 Gram(s) Oral daily  predniSONE   Tablet 20 milliGRAM(s) Oral daily  senna 2 Tablet(s) Oral at bedtime  silver sulfADIAZINE 1% Cream 1 Application(s) Topical two times a day    MEDICATIONS  (PRN):  acetaminophen     Tablet .. 650 milliGRAM(s) Oral every 6 hours PRN Temp greater or equal to 38C (100.4F), Mild Pain (1 - 3)  dextrose Oral Gel 15 Gram(s) Oral once PRN Blood Glucose LESS THAN 70 milliGRAM(s)/deciliter    Allergies & Intolerances:   No Known Allergies    Review of Systems:  Constitutional: No fever, chills  Eyes: No blurry vision, flashes, floaters, FBS, erythema, discharge, double vision, OU  Neuro: No tremors  Cardiovascular: No chest pain, palpitations  Respiratory: No SOB, no cough  GI: No nausea, vomiting, abdominal pain  : No dysuria  Skin: no rash  Psych: no depression  Endocrine: no polyuria, polydipsia  Heme/lymph: no swelling    VITALS: T(C): 36.4 (24 @ 12:40)  T(F): 97.5 (24 @ 12:40), Max: 98.3 (24 @ 08:39)  HR: 69 (24 @ 12:40) (69 - 80)  BP: 130/69 (24 @ 12:40) (127/69 - 133/61)  RR:  (17 - 18)  SpO2:  (98% - 100%)  Wt(kg): --  General: AAO x 3, appropriate mood and affect    Ophthalmology Exam:  Visual acuity (sc): NLP OU  Pupils: non-reactive OU  Ttono: 13 ou  Extraocular movements (EOMs): Full OU, no pain,   Confrontational Visual Field (CVF): charles DUE TO VISION  Color Plates: charles DUE TO VISION    Pen Light Exam (PLE)  External: Flat OU  Lids/Lashes/Lacrimal Ducts: Flat OU    Sclera/Conjunctiva: W+Q OU  Cornea: Cl OU  Anterior Chamber: D+F OU    Iris: Flat OU  Lens: Cl OU    Fundus Exam: dilated with 1% tropicamide and 2.5% phenylephrine  Approval obtained from primary team for dilation  Patient aware that pupils can remained dilated for at least 4-6 hours  Exam performed with 20D lens    Vitreous: wnl OU  Disc, cup/disc:  0.3 OU no edema appreciated   Macula: wnl OU  Vessels: tortuous OU  Periphery: wnl OU    Labs/Imaging:  Per [place] records:  Surgical Path- temporal artery biopsy:  2023 - shows evidence of GCA   [facility] DEPARTMENT OF OPHTHALMOLOGY - INITIAL ADULT CONSULT  -----------------------------------------------------------------------------------------------------------------  Brien Russ MD  PGY 3  Contact on Teams  -----------------------------------------------------------------------------------------------------------------    HPI:  78 yo F PMH IDDM, HTN, HLD, OP, Parkinson's, GCA BIBEMS from [facility]. Per documentation, patient was found on the floor after a fall. No HS or LOC. Attempted to contacted [place] to obtain additional collateral. Of note, patient has had multiple falls recently. Patient is A&Ox0 so history obtained as per daughter Bina. Per daughter, patient is A&Ox3 at baseline and conversant. In the ED, patient is hemodynamically stable. CTH was negative for bleed or infarct but showed hydrocephalus of lateral and third ventricles and narrowed callosal angle suspicious for normal pressure hydrocephalus. CT A/P showed acute emphysematous cystitis. Fluid in the vagina with marked vaginal mucosal enhancement and questionable locules of luminal air inseparable from the adjacent emphysematous bladder wall. Cannot exclude vesicovaginal fistula. Labs significant gfor Na 150, K 3.0, WBC 15.4, troponin 70->54, ->147. UA in the ED +nitrites, moderate leukocyte esterase +protein, +ketones 4k WBC, 1.2k RBC, +glucose. Patient was treated with CTX, Zosyn, and 1L NS in the ED. (2024 16:38)    Interval History: Ophtho consulted for bitemporal vision loss and hx of GCA. Patient only partially able to answer questions with , but able to follow simple command for ocular exam.     PAST MEDICAL & SURGICAL HISTORY:  HTN (hypertension)  HLD (hyperlipidemia)  DM (diabetes mellitus)  CL (cholelithiasis)  Osteoporosis  Diabetic neuropathy  Diabetes  H/O Parkinson's disease  Hypertension  Giant cell arteritis  H/O left inguinal hernia repair  History of  section      Past Ocular History: GCA w  Ophthalmic Medications: ***  FAMILY HISTORY:  Family history of diabetes mellitus in mother    FH: dementia  mother    FH: HTN (hypertension)  mother    Family history of stomach cancer  sister    Family history of oral cancer  sister    Family history of depression  mother        MEDICATIONS  (STANDING):  aspirin enteric coated 81 milliGRAM(s) Oral daily  calcium carbonate 1250 mG  + Vitamin D (OsCal 500 + D) 1 Tablet(s) Oral daily  carbidopa/levodopa  25/100 1.5 Tablet(s) Oral three times a day  carbidopa/levodopa CR 25/100 1 Tablet(s) Oral at bedtime  cefTRIAXone   IVPB 1000 milliGRAM(s) IV Intermittent every 24 hours  dextrose 5% + sodium chloride 0.45%. 1000 milliLiter(s) (100 mL/Hr) IV Continuous <Continuous>  dextrose 5%. 1000 milliLiter(s) (50 mL/Hr) IV Continuous <Continuous>  dextrose 50% Injectable 12.5 Gram(s) IV Push once  dextrose 50% Injectable 25 Gram(s) IV Push once  enoxaparin Injectable 40 milliGRAM(s) SubCutaneous every 24 hours  fenofibrate Tablet 145 milliGRAM(s) Oral daily  ferrous    sulfate Liquid 220 milliGRAM(s) Oral daily  folic acid 1 milliGRAM(s) Oral daily  glucagon  Injectable 1 milliGRAM(s) IntraMuscular once  insulin glargine Injectable (LANTUS) 5 Unit(s) SubCutaneous at bedtime  insulin lispro (ADMELOG) corrective regimen sliding scale   SubCutaneous three times a day before meals  metoprolol succinate ER 50 milliGRAM(s) Oral daily  mirtazapine 7.5 milliGRAM(s) Oral daily  pantoprazole    Tablet 40 milliGRAM(s) Oral before breakfast  polyethylene glycol 3350 17 Gram(s) Oral daily  predniSONE   Tablet 20 milliGRAM(s) Oral daily  senna 2 Tablet(s) Oral at bedtime  silver sulfADIAZINE 1% Cream 1 Application(s) Topical two times a day    MEDICATIONS  (PRN):  acetaminophen     Tablet .. 650 milliGRAM(s) Oral every 6 hours PRN Temp greater or equal to 38C (100.4F), Mild Pain (1 - 3)  dextrose Oral Gel 15 Gram(s) Oral once PRN Blood Glucose LESS THAN 70 milliGRAM(s)/deciliter    Allergies & Intolerances:   No Known Allergies    Review of Systems:  Constitutional: No fever, chills  Eyes: No blurry vision, flashes, floaters, FBS, erythema, discharge, double vision, OU  Neuro: No tremors  Cardiovascular: No chest pain, palpitations  Respiratory: No SOB, no cough  GI: No nausea, vomiting, abdominal pain  : No dysuria  Skin: no rash  Psych: no depression  Endocrine: no polyuria, polydipsia  Heme/lymph: no swelling    VITALS: T(C): 36.4 (24 @ 12:40)  T(F): 97.5 (24 @ 12:40), Max: 98.3 (24 @ 08:39)  HR: 69 (24 @ 12:40) (69 - 80)  BP: 130/69 (24 @ 12:40) (127/69 - 133/61)  RR:  (17 - 18)  SpO2:  (98% - 100%)  Wt(kg): --  General: AAO x 3, appropriate mood and affect    Ophthalmology Exam:  Visual acuity (sc): NLP OU (Poor historian but doesn't blink to threat, doesn't blink to light)  Pupils: non-reactive OU  Ttono: 13 ou  Extraocular movements (EOMs): Full OU, no pain,   Confrontational Visual Field (CVF): charles DUE TO VISION  Color Plates: charles DUE TO VISION    Pen Light Exam (PLE)  External: Flat OU  Lids/Lashes/Lacrimal Ducts: Flat OU    Sclera/Conjunctiva: W+Q OU  Cornea: Cl OU  Anterior Chamber: D+F OU    Iris: Flat OU  Lens: PCIOL OU    Fundus Exam: dilated with 1% tropicamide and 2.5% phenylephrine  Approval obtained from primary team for dilation  Patient aware that pupils can remained dilated for at least 4-6 hours  Exam performed with 20D lens    Vitreous: wnl OU  Disc, cup/disc:  0.3 OU no edema appreciated   Macula: wnl OU  Vessels: tortuous OU  Periphery: wnl OU    Labs/Imaging:  Per [place] records:  Surgical Path- temporal artery biopsy:  2023 - shows evidence of GCA

## 2024-02-01 NOTE — DIETITIAN INITIAL EVALUATION ADULT - PERTINENT MEDS FT
MEDICATIONS  (STANDING):  aspirin enteric coated 81 milliGRAM(s) Oral daily  calcium carbonate 1250 mG  + Vitamin D (OsCal 500 + D) 1 Tablet(s) Oral daily  carbidopa/levodopa  25/100 1.5 Tablet(s) Oral three times a day  carbidopa/levodopa CR 25/100 1 Tablet(s) Oral at bedtime  cefTRIAXone   IVPB 1000 milliGRAM(s) IV Intermittent every 24 hours  dextrose 5% + sodium chloride 0.45%. 1000 milliLiter(s) (100 mL/Hr) IV Continuous <Continuous>  dextrose 5%. 1000 milliLiter(s) (50 mL/Hr) IV Continuous <Continuous>  dextrose 50% Injectable 25 Gram(s) IV Push once  dextrose 50% Injectable 12.5 Gram(s) IV Push once  enoxaparin Injectable 40 milliGRAM(s) SubCutaneous every 24 hours  fenofibrate Tablet 145 milliGRAM(s) Oral daily  ferrous    sulfate Liquid 220 milliGRAM(s) Oral daily  folic acid 1 milliGRAM(s) Oral daily  glucagon  Injectable 1 milliGRAM(s) IntraMuscular once  insulin glargine Injectable (LANTUS) 5 Unit(s) SubCutaneous at bedtime  insulin lispro (ADMELOG) corrective regimen sliding scale   SubCutaneous three times a day before meals  metoprolol succinate ER 50 milliGRAM(s) Oral daily  mirtazapine 7.5 milliGRAM(s) Oral daily  pantoprazole    Tablet 40 milliGRAM(s) Oral before breakfast  polyethylene glycol 3350 17 Gram(s) Oral daily  predniSONE   Tablet 20 milliGRAM(s) Oral daily  senna 2 Tablet(s) Oral at bedtime  silver sulfADIAZINE 1% Cream 1 Application(s) Topical two times a day    MEDICATIONS  (PRN):  acetaminophen     Tablet .. 650 milliGRAM(s) Oral every 6 hours PRN Temp greater or equal to 38C (100.4F), Mild Pain (1 - 3)  dextrose Oral Gel 15 Gram(s) Oral once PRN Blood Glucose LESS THAN 70 milliGRAM(s)/deciliter

## 2024-02-01 NOTE — SWALLOW BEDSIDE ASSESSMENT ADULT - COMMENTS
As per Hospitalist note datde 1/31/24 "78 yo F PMH IDDM, HTN, HLD, OP, Parkinson's, GCA BIBEMS from [facility] s/p Fall and AMS. found to have toxic/metabolic derangement wither hypernatremia and acute cystitis, and additionally cannot rule out stroke."    CTH 1/30/24 "IMPRESSION: No acute intracranial hemorrhage. Mild hydrocephalus involving the lateral and third ventricle with overall normal-appearing fourth ventricle. Narrowed callosal angle at the level   the anterior commissure suspicious for normal pressure hydrocephalus."    CXR 1/30/24 "IMPRESSION: Clear lungs."    Patient visited at bedside for clinical swallow evaluation. Patient's daughter arrived at bedside midway through evaluation. [employer]  #[id number] (Joey) utilized in patient's primary language of Indonesian. Patient is able to follow 1-step directives and make basic wants/needs known. Unable to adequately assess oral and pharyngeal phases of the swallow given limited PO trials, however no overt s/s of reduced airway protection on accepted trials.

## 2024-02-01 NOTE — PROGRESS NOTE ADULT - CONVERSATION DETAILS
Spoke to Patient and two daughters Ciarra and Bina. Pt stated she doesn't want to assign HCP but stated all three daughters can make a decision in an event when she is not able to. Discussed Code status, patient initially stated she would not want to be resuscitated and intubated but daughter Ciarra asked for some time to discuss it over with patient and her sisters incase if she is not able to understand given her mentation right now, provider will revisit the conversation when pt is more alert and oriented.

## 2024-02-01 NOTE — DIETITIAN INITIAL EVALUATION ADULT - ORAL NUTRITION SUPPLEMENTS
Recommend Magic Cup Reduced Sugar (provides 280kcal, 9gms protein per serving) BID to support nutrition needs, provide as medically feasible ( Food and Nutrition Department will provide )

## 2024-02-01 NOTE — DIETITIAN INITIAL EVALUATION ADULT - PERTINENT LABORATORY DATA
02-01    147<H>  |  114<H>  |  15  ----------------------------<  120<H>  3.6   |  22  |  0.34<L>    Ca    8.1<L>      01 Feb 2024 05:51  Phos  2.4     02-01  Mg     1.60     02-01    TPro  x   /  Alb  2.8<L>  /  TBili  x   /  DBili  x   /  AST  x   /  ALT  x   /  AlkPhos  x   02-01  POCT Blood Glucose.: 157 mg/dL (02-01-24 @ 12:06)  A1C with Estimated Average Glucose Result: 7.5 % (01-31-24 @ 05:57)

## 2024-02-01 NOTE — PROGRESS NOTE ADULT - ASSESSMENT
78 yo F PMH IDDM, HTN, HLD, OP, Parkinson's disease, GCA (recently diagnosed) BIBEMS from [facility] s/p Fall and AMS. found to have toxic/metabolic derangement wither hypernatremia and acute cystitis, and additionally cannot rule out stroke.

## 2024-02-02 LAB
-  AMOXICILLIN/CLAVULANIC ACID: SIGNIFICANT CHANGE UP
-  AMPICILLIN/SULBACTAM: SIGNIFICANT CHANGE UP
-  AMPICILLIN: SIGNIFICANT CHANGE UP
-  AZTREONAM: SIGNIFICANT CHANGE UP
-  CEFAZOLIN: SIGNIFICANT CHANGE UP
-  CEFEPIME: SIGNIFICANT CHANGE UP
-  CEFOXITIN: SIGNIFICANT CHANGE UP
-  CEFTRIAXONE: SIGNIFICANT CHANGE UP
-  CEFUROXIME: SIGNIFICANT CHANGE UP
-  CIPROFLOXACIN: SIGNIFICANT CHANGE UP
-  ERTAPENEM: SIGNIFICANT CHANGE UP
-  GENTAMICIN: SIGNIFICANT CHANGE UP
-  IMIPENEM: SIGNIFICANT CHANGE UP
-  LEVOFLOXACIN: SIGNIFICANT CHANGE UP
-  MEROPENEM: SIGNIFICANT CHANGE UP
-  NITROFURANTOIN: SIGNIFICANT CHANGE UP
-  PIPERACILLIN/TAZOBACTAM: SIGNIFICANT CHANGE UP
-  TOBRAMYCIN: SIGNIFICANT CHANGE UP
-  TRIMETHOPRIM/SULFAMETHOXAZOLE: SIGNIFICANT CHANGE UP
ANION GAP SERPL CALC-SCNC: 13 MMOL/L — SIGNIFICANT CHANGE UP (ref 7–14)
BUN SERPL-MCNC: 14 MG/DL — SIGNIFICANT CHANGE UP (ref 7–23)
CALCIUM SERPL-MCNC: 8.4 MG/DL — SIGNIFICANT CHANGE UP (ref 8.4–10.5)
CHLORIDE SERPL-SCNC: 110 MMOL/L — HIGH (ref 98–107)
CO2 SERPL-SCNC: 20 MMOL/L — LOW (ref 22–31)
CREAT SERPL-MCNC: 0.3 MG/DL — LOW (ref 0.5–1.3)
CRP SERPL-MCNC: 20.4 MG/L — HIGH
CULTURE RESULTS: ABNORMAL
CULTURE RESULTS: ABNORMAL
EGFR: 107 ML/MIN/1.73M2 — SIGNIFICANT CHANGE UP
ERYTHROCYTE [SEDIMENTATION RATE] IN BLOOD: 23 MM/HR — SIGNIFICANT CHANGE UP (ref 4–25)
FOLATE SERPL-MCNC: 12.1 NG/ML — SIGNIFICANT CHANGE UP (ref 3.1–17.5)
GLUCOSE BLDC GLUCOMTR-MCNC: 137 MG/DL — HIGH (ref 70–99)
GLUCOSE BLDC GLUCOMTR-MCNC: 207 MG/DL — HIGH (ref 70–99)
GLUCOSE BLDC GLUCOMTR-MCNC: 250 MG/DL — HIGH (ref 70–99)
GLUCOSE BLDC GLUCOMTR-MCNC: 259 MG/DL — HIGH (ref 70–99)
GLUCOSE SERPL-MCNC: 211 MG/DL — HIGH (ref 70–99)
HCT VFR BLD CALC: 31.1 % — LOW (ref 34.5–45)
HGB BLD-MCNC: 10.7 G/DL — LOW (ref 11.5–15.5)
MAGNESIUM SERPL-MCNC: 2.2 MG/DL — SIGNIFICANT CHANGE UP (ref 1.6–2.6)
MCHC RBC-ENTMCNC: 28.5 PG — SIGNIFICANT CHANGE UP (ref 27–34)
MCHC RBC-ENTMCNC: 34.4 GM/DL — SIGNIFICANT CHANGE UP (ref 32–36)
MCV RBC AUTO: 82.7 FL — SIGNIFICANT CHANGE UP (ref 80–100)
METHOD TYPE: SIGNIFICANT CHANGE UP
NRBC # BLD: 0 /100 WBCS — SIGNIFICANT CHANGE UP (ref 0–0)
NRBC # FLD: 0 K/UL — SIGNIFICANT CHANGE UP (ref 0–0)
ORGANISM # SPEC MICROSCOPIC CNT: ABNORMAL
ORGANISM # SPEC MICROSCOPIC CNT: ABNORMAL
PHOSPHATE SERPL-MCNC: 2.8 MG/DL — SIGNIFICANT CHANGE UP (ref 2.5–4.5)
PLATELET # BLD AUTO: 107 K/UL — LOW (ref 150–400)
POTASSIUM SERPL-MCNC: 4.8 MMOL/L — SIGNIFICANT CHANGE UP (ref 3.5–5.3)
POTASSIUM SERPL-SCNC: 4.8 MMOL/L — SIGNIFICANT CHANGE UP (ref 3.5–5.3)
RBC # BLD: 3.76 M/UL — LOW (ref 3.8–5.2)
RBC # FLD: 21.6 % — HIGH (ref 10.3–14.5)
SODIUM SERPL-SCNC: 143 MMOL/L — SIGNIFICANT CHANGE UP (ref 135–145)
VIT B12 SERPL-MCNC: >2000 PG/ML — SIGNIFICANT CHANGE UP (ref 200–900)
WBC # BLD: 6.51 K/UL — SIGNIFICANT CHANGE UP (ref 3.8–10.5)
WBC # FLD AUTO: 6.51 K/UL — SIGNIFICANT CHANGE UP (ref 3.8–10.5)

## 2024-02-02 PROCEDURE — 99222 1ST HOSP IP/OBS MODERATE 55: CPT | Mod: GC

## 2024-02-02 PROCEDURE — 99233 SBSQ HOSP IP/OBS HIGH 50: CPT

## 2024-02-02 RX ORDER — CEFTRIAXONE 500 MG/1
1000 INJECTION, POWDER, FOR SOLUTION INTRAMUSCULAR; INTRAVENOUS EVERY 24 HOURS
Refills: 0 | Status: COMPLETED | OUTPATIENT
Start: 2024-02-03 | End: 2024-02-03

## 2024-02-02 RX ORDER — INSULIN GLARGINE 100 [IU]/ML
8 INJECTION, SOLUTION SUBCUTANEOUS AT BEDTIME
Refills: 0 | Status: DISCONTINUED | OUTPATIENT
Start: 2024-02-02 | End: 2024-02-09

## 2024-02-02 RX ORDER — SODIUM CHLORIDE 9 MG/ML
1000 INJECTION, SOLUTION INTRAVENOUS
Refills: 0 | Status: DISCONTINUED | OUTPATIENT
Start: 2024-02-02 | End: 2024-02-04

## 2024-02-02 RX ADMIN — Medication 1 APPLICATION(S): at 18:28

## 2024-02-02 RX ADMIN — Medication 1 MILLIGRAM(S): at 13:43

## 2024-02-02 RX ADMIN — Medication 1 APPLICATION(S): at 05:38

## 2024-02-02 RX ADMIN — CEFTRIAXONE 100 MILLIGRAM(S): 500 INJECTION, POWDER, FOR SOLUTION INTRAMUSCULAR; INTRAVENOUS at 10:44

## 2024-02-02 RX ADMIN — CARBIDOPA AND LEVODOPA 1 TABLET(S): 25; 100 TABLET ORAL at 21:38

## 2024-02-02 RX ADMIN — INSULIN GLARGINE 8 UNIT(S): 100 INJECTION, SOLUTION SUBCUTANEOUS at 22:40

## 2024-02-02 RX ADMIN — Medication 4: at 18:27

## 2024-02-02 RX ADMIN — Medication 6: at 13:48

## 2024-02-02 RX ADMIN — CARBIDOPA AND LEVODOPA 1.5 TABLET(S): 25; 100 TABLET ORAL at 21:37

## 2024-02-02 RX ADMIN — MIRTAZAPINE 7.5 MILLIGRAM(S): 45 TABLET, ORALLY DISINTEGRATING ORAL at 13:43

## 2024-02-02 RX ADMIN — Medication 145 MILLIGRAM(S): at 13:44

## 2024-02-02 RX ADMIN — ENOXAPARIN SODIUM 40 MILLIGRAM(S): 100 INJECTION SUBCUTANEOUS at 21:36

## 2024-02-02 RX ADMIN — Medication 81 MILLIGRAM(S): at 13:45

## 2024-02-02 RX ADMIN — Medication 40 MILLIEQUIVALENT(S): at 04:31

## 2024-02-02 RX ADMIN — Medication 220 MILLIGRAM(S): at 13:44

## 2024-02-02 RX ADMIN — Medication 4: at 09:07

## 2024-02-02 RX ADMIN — SODIUM CHLORIDE 75 MILLILITER(S): 9 INJECTION, SOLUTION INTRAVENOUS at 18:27

## 2024-02-02 RX ADMIN — Medication 1 TABLET(S): at 13:43

## 2024-02-02 RX ADMIN — Medication 50 MILLIGRAM(S): at 00:39

## 2024-02-02 RX ADMIN — Medication 25 GRAM(S): at 01:57

## 2024-02-02 NOTE — CONSULT NOTE ADULT - ASSESSMENT
80 yo F PMH IDDM, HTN, HLD, OP, Parkinson's, GCA RAMON from [facility] who presents for AMS, found to have UTI. As well, complaining of bitemporal vision loss c/f GCA flare. Rheumatology consulted for GCA flare management.       #Hx of GCA  #AMS  #Bitemporal vision loss  -Dx in Dec 2023 at [facility] after a fall and acute bitemporal vision loss  -ophtho exam with optic disc edema. Elevated ESR 88 and CRP 77.8   -S/p R temporal artery that per discharge summary, demonstrated evidence of GCA  -S/p 1g solumedrol 3 day pulse, IVIG and initiated on MTX 15mg. Taper from discharge summary for prednisone: Pred 50mg until Jan 12th, switch to Pred 40mg for additional 2 weeks until follow up.   -Was on Pred 20mg daily at [facility] and did not follow up with rheumatology or ophtho   -Evaluated by ophtho here, noted to have concern for worsening vision based on exam in house compared to OSH documentation. As well, concerned that AMS could be 2/2 systemic GCA.   -Started on pulse dose steroids on 2/1- per daughter, there is some reported improvement in vision and mental status.   -However, pt also with emphysematous cystitis and has been on abx, which could also have contributed to mental status changes  -Repeat ESR and CRP inpt decreased from when pt was dx- ESR 23, CRP 20   -As well, has repeat imaging here that demonstrates ventriculomegaly. Neuro evaluated, have lower suspicion as cause for current symptoms     Recommendations  -Per repeat ophtho exam today, still no light perception on exam (which was not seen in OSH ophtho notes). Will treat as GCA ocular flare, as pt and her daughter report improved vision after 1g solumedrol.   -Overall, have lower suspicion for systemic GCA as pt has hx of recurrent UTIs that cause AMS and pt has also been improving while on abx. Pt also with improved inflammatory markers compared to when she was dx at OSH.   -As pt is elderly, with small BMI and being treated for active UTI, recommend IV 500mg Solumedrol as the next two doses and then can go to 1mg/kg after that  -Will follow up MRI/MRA brain and orbits    Discussed with Dr. Angel, primary service and ophtho  Kasey Sepulveda MD   PGY-4  Re[employer] on TEAMS  Pager [phone]  Rheumatology Fellow

## 2024-02-02 NOTE — ADVANCED PRACTICE NURSE CONSULT - RECOMMEDATIONS
Topical recommendations:     Sacrum to gluteal fold and bilateral buttock- Cleanse with skin cleanser, pat dry. Apply TRIAD paste twice a day and PRN with incontinent episodes. With episodes of incontinence only remove soiled layer of Triad, then reinforce with thin layer.     Continue low air loss bed therapy,  heel elevation with offloading boots, turn & reposition q2h with fluidized pillow, continue moisture management with barrier creams as specified above & single breathable pad, continue measures to decrease friction/shear.   Plan discussed with patients daughters - educated on topical wound therapy to optimize wound healing and importance of turning and positioning. Questions answered.     Patricia Bryant, BEN provider notified of findings and recs.       Please reconsult if any wound changes or if we can be of further assistance (ext [phone]).

## 2024-02-02 NOTE — CONSULT NOTE ADULT - ATTENDING COMMENTS
agree w/ above   clinically, does not seem that her status was related to GCA flare - many other reasons for this - fall, altered status, urosepsis, deconditioning  If the optho team strongly believes Vision status has declined and that this is 2/2 arteritis that would suggest need for HD steroids  Otherwise we favor return to 40mg qd which was the dose she was due based on taper  Will follow

## 2024-02-02 NOTE — PROGRESS NOTE ADULT - ASSESSMENT
78 yo F PMH IDDM, HTN, HLD, OP, Parkinson's disease, GCA (recently diagnosed) BIBEMS from [facility] s/p Fall and AMS. found to have toxic/metabolic derangement wither hypernatremia and acute cystitis, and additionally cannot rule out stroke.

## 2024-02-02 NOTE — CONSULT NOTE ADULT - SUBJECTIVE AND OBJECTIVE BOX
STANISLAV MACARIO  [id number]    HISTORY OF PRESENT ILLNESS:  HPI:  78 yo F PMH IDDM, HTN, HLD, OP, Parkinson's, GCA BIBEMS from [facility]. Per documentation, patient was found on the floor after a fall. No HS or LOC. Attempted to contacted [facility] to obtain additional collateral. Of note, patient has had multiple falls recently. Patient is A&Ox0 so history obtained as per daughter Bina. Per daughter, patient is A&Ox3 at baseline and conversant. In the ED, patient is hemodynamically stable. CTH was negative for bleed or infarct but showed hydrocephalus of lateral and third ventricles and narrowed callosal angle suspicious for normal pressure hydrocephalus. CT A/P showed acute emphysematous cystitis. Fluid in the vagina with marked vaginal mucosal enhancement and questionable locules of luminal air inseparable from the adjacent emphysematous bladder wall. Cannot exclude vesicovaginal fistula. Labs significant gfor Na 150, K 3.0, WBC 15.4, troponin 70->54, ->147. UA in the ED +nitrites, moderate leukocyte esterase +protein, +ketones 4k WBC, 1.2k RBC, +glucose. Patient was treated with CTX, Zosyn, and 1L NS in the ED. (30 Jan 2024 16:38)      Rheum ROS    Denies fevers/chills/weight loss/night sweats/alopecia/sinus disease/asthma history/oral ulcers/dysphagia/vision changes/Raynauds/VTE/miscarraiges/sicca symptoms/urinary changes/edema/SOB/joint pain/swelling/jaw claudication/rash/photosensitivity/morning stiffness    Endorses fevers/chills/weight loss/night sweats/alopecia/sinus disease/asthma history/oral ulcers/dysphagia/vision changes/Raynauds/VTE/miscarraiges/sicca symptoms/urinary changes/edema/SOB/joint pain/swelling/jaw claudication/rash/photosensitivity/morning stiffness      MEDICATIONS  (STANDING):  aspirin enteric coated 81 milliGRAM(s) Oral daily  calcium carbonate 1250 mG  + Vitamin D (OsCal 500 + D) 1 Tablet(s) Oral daily  carbidopa/levodopa  25/100 1.5 Tablet(s) Oral three times a day  carbidopa/levodopa CR 25/100 1 Tablet(s) Oral at bedtime  dextrose 5% + sodium chloride 0.45%. 1000 milliLiter(s) (100 mL/Hr) IV Continuous <Continuous>  dextrose 5%. 1000 milliLiter(s) (50 mL/Hr) IV Continuous <Continuous>  dextrose 50% Injectable 25 Gram(s) IV Push once  dextrose 50% Injectable 12.5 Gram(s) IV Push once  enoxaparin Injectable 40 milliGRAM(s) SubCutaneous every 24 hours  fenofibrate Tablet 145 milliGRAM(s) Oral daily  ferrous    sulfate Liquid 220 milliGRAM(s) Oral daily  folic acid 1 milliGRAM(s) Oral daily  glucagon  Injectable 1 milliGRAM(s) IntraMuscular once  insulin glargine Injectable (LANTUS) 5 Unit(s) SubCutaneous at bedtime  insulin lispro (ADMELOG) corrective regimen sliding scale   SubCutaneous three times a day before meals  metoprolol succinate ER 50 milliGRAM(s) Oral daily  mirtazapine 7.5 milliGRAM(s) Oral daily  pantoprazole    Tablet 40 milliGRAM(s) Oral before breakfast  polyethylene glycol 3350 17 Gram(s) Oral daily  senna 2 Tablet(s) Oral at bedtime  silver sulfADIAZINE 1% Cream 1 Application(s) Topical two times a day    MEDICATIONS  (PRN):  acetaminophen     Tablet .. 650 milliGRAM(s) Oral every 6 hours PRN Temp greater or equal to 38C (100.4F), Mild Pain (1 - 3)  dextrose Oral Gel 15 Gram(s) Oral once PRN Blood Glucose LESS THAN 70 milliGRAM(s)/deciliter      Allergies    No Known Allergies    Intolerances        PERTINENT MEDICATION HISTORY:    SOCIAL HISTORY:  OCCUPATION:  TRAVEL HISTORY:    FAMILY HISTORY:  Family history of diabetes mellitus in mother    FH: dementia  mother    FH: HTN (hypertension)  mother    Family history of stomach cancer  sister    Family history of oral cancer  sister    Family history of depression  mother        Vital Signs Last 24 Hrs  T(C): 36.4 (02 Feb 2024 04:50), Max: 36.9 (01 Feb 2024 18:27)  T(F): 97.5 (02 Feb 2024 04:50), Max: 98.5 (01 Feb 2024 18:27)  HR: 78 (02 Feb 2024 04:50) (69 - 91)  BP: 144/79 (02 Feb 2024 04:50) (109/49 - 144/79)  BP(mean): --  RR: 17 (02 Feb 2024 04:50) (17 - 18)  SpO2: 99% (02 Feb 2024 04:50) (99% - 100%)    Parameters below as of 02 Feb 2024 04:50  Patient On (Oxygen Delivery Method): room air        Physical Exam:  General: No apparent distress  HEENT: EOMI, MMM  CVS: +S1/S2, RRR, no murmurs/rubs/gallops  Resp: CTA b/l. No crackles/wheezing  GI: Soft, NT/ND +BS  MSK: no swelling/warmth/erythema of the joints of the UE/LE  Neuro: AAOx3  Skin: no visible rashes    LABS:                        10.7   6.51  )-----------( 107      ( 02 Feb 2024 06:08 )             31.1     02-02    143  |  110<H>  |  14  ----------------------------<  211<H>  4.8   |  20<L>  |  0.30<L>    Ca    8.4      02 Feb 2024 06:08  Phos  2.8     02-02  Mg     2.20     02-02    TPro  x   /  Alb  2.8<L>  /  TBili  x   /  DBili  x   /  AST  x   /  ALT  x   /  AlkPhos  x   02-01      Urinalysis Basic - ( 02 Feb 2024 06:08 )    Color: x / Appearance: x / SG: x / pH: x  Gluc: 211 mg/dL / Ketone: x  / Bili: x / Urobili: x   Blood: x / Protein: x / Nitrite: x   Leuk Esterase: x / RBC: x / WBC x   Sq Epi: x / Non Sq Epi: x / Bacteria: x            Rheumatology Work Up    Sedimentation Rate, Erythrocyte: 23 mm/hr [4 - 25] (02-02-24 @ 06:08)  C-Reactive Protein, Serum: 20.4 mg/L *H* (02-02-24 @ 06:08)  C-Reactive Protein, Serum: 16.0 mg/L *H* (02-01-24 @ 05:51)  Creatine Kinase, Serum: 92 U/L [25 - 170] (01-30-24 @ 03:22)            RADIOLOGY & ADDITIONAL STUDIES:     STANISLAV AMIRAH  [id number]    HISTORY OF PRESENT ILLNESS:  HPI:  80 yo F PMH IDDM, HTN, HLD, OP, Parkinson's, GCA BIBEMS from [facility]. Per documentation, patient was found on the floor after a fall. No HS or LOC. Attempted to contacted [facility] to obtain additional collateral. Of note, patient has had multiple falls recently. Patient is A&Ox0 so history obtained as per daughter Bina. Per daughter, patient is A&Ox3 at baseline and conversant. In the ED, patient is hemodynamically stable. CTH was negative for bleed or infarct but showed hydrocephalus of lateral and third ventricles and narrowed callosal angle suspicious for normal pressure hydrocephalus. CT A/P showed acute emphysematous cystitis. Fluid in the vagina with marked vaginal mucosal enhancement and questionable locules of luminal air inseparable from the adjacent emphysematous bladder wall. Cannot exclude vesicovaginal fistula. Labs significant gfor Na 150, K 3.0, WBC 15.4, troponin 70->54, ->147. UA in the ED +nitrites, moderate leukocyte esterase +protein, +ketones 4k WBC, 1.2k RBC, +glucose. Patient was treated with CTX, Zosyn, and 1L NS in the ED. (30 Jan 2024 16:38)    Rheumatology consulted for GCA management. Pt with newly dx GCA (Dec 2023)  Per [facility] inpt documentation "Has had multiple falls in the past per Neurologist as seen in Care Everywhere, thought to be 2/2 gait instability from Parkinson's. Patient reported fall on Tuesday (12/5) w/ head strike on right side, followed by blurred vision (worse on right). Again had another fall on day of presentation, unable to get up and found by building super (unknown amount of time), and brought in to the ED. Found to have Ecoli UTI s/p treatment w/ CTX. On initial day of presentation, reported blurred vision and on morning of 12/7 endorsed complete bilateral vision loss. Stroke code activated with negative CTH and CTA Head + neck. MRI Brain, orbit, and venogram without any acute findings as well. Found to have significant papilledema on ophthalmology evaluation, and elevated ESR/CRP. Started on high dose steroids to treat for possible GCA, and now pending temporal artery biopsy."  S/p R temporal artery biopsy on 12/14/23 that demonstrated evidence of GCA.   S/p Pulse dose steroids, IVIG. Prednisone taper per documentation as follows Pred 60mg until Jan 4th, then Pred 50mg until Jan 12th followed by pred 40mg daily until follow up.   Also started on MTX 15mg weekly.       Rheum ROS    Denies fevers/chills/weight loss/night sweats/alopecia/sinus disease/asthma history/oral ulcers/dysphagia/vision changes/Raynauds/VTE/miscarraiges/sicca symptoms/urinary changes/edema/SOB/joint pain/swelling/jaw claudication/rash/photosensitivity/morning stiffness    Endorses fevers/chills/weight loss/night sweats/alopecia/sinus disease/asthma history/oral ulcers/dysphagia/vision changes/Raynauds/VTE/miscarraiges/sicca symptoms/urinary changes/edema/SOB/joint pain/swelling/jaw claudication/rash/photosensitivity/morning stiffness      MEDICATIONS  (STANDING):  aspirin enteric coated 81 milliGRAM(s) Oral daily  calcium carbonate 1250 mG  + Vitamin D (OsCal 500 + D) 1 Tablet(s) Oral daily  carbidopa/levodopa  25/100 1.5 Tablet(s) Oral three times a day  carbidopa/levodopa CR 25/100 1 Tablet(s) Oral at bedtime  dextrose 5% + sodium chloride 0.45%. 1000 milliLiter(s) (100 mL/Hr) IV Continuous <Continuous>  dextrose 5%. 1000 milliLiter(s) (50 mL/Hr) IV Continuous <Continuous>  dextrose 50% Injectable 25 Gram(s) IV Push once  dextrose 50% Injectable 12.5 Gram(s) IV Push once  enoxaparin Injectable 40 milliGRAM(s) SubCutaneous every 24 hours  fenofibrate Tablet 145 milliGRAM(s) Oral daily  ferrous    sulfate Liquid 220 milliGRAM(s) Oral daily  folic acid 1 milliGRAM(s) Oral daily  glucagon  Injectable 1 milliGRAM(s) IntraMuscular once  insulin glargine Injectable (LANTUS) 5 Unit(s) SubCutaneous at bedtime  insulin lispro (ADMELOG) corrective regimen sliding scale   SubCutaneous three times a day before meals  metoprolol succinate ER 50 milliGRAM(s) Oral daily  mirtazapine 7.5 milliGRAM(s) Oral daily  pantoprazole    Tablet 40 milliGRAM(s) Oral before breakfast  polyethylene glycol 3350 17 Gram(s) Oral daily  senna 2 Tablet(s) Oral at bedtime  silver sulfADIAZINE 1% Cream 1 Application(s) Topical two times a day    MEDICATIONS  (PRN):  acetaminophen     Tablet .. 650 milliGRAM(s) Oral every 6 hours PRN Temp greater or equal to 38C (100.4F), Mild Pain (1 - 3)  dextrose Oral Gel 15 Gram(s) Oral once PRN Blood Glucose LESS THAN 70 milliGRAM(s)/deciliter      Allergies    No Known Allergies    Intolerances        PERTINENT MEDICATION HISTORY:    SOCIAL HISTORY:  OCCUPATION:  TRAVEL HISTORY:    FAMILY HISTORY:  Family history of diabetes mellitus in mother    FH: dementia  mother    FH: HTN (hypertension)  mother    Family history of stomach cancer  sister    Family history of oral cancer  sister    Family history of depression  mother        Vital Signs Last 24 Hrs  T(C): 36.4 (02 Feb 2024 04:50), Max: 36.9 (01 Feb 2024 18:27)  T(F): 97.5 (02 Feb 2024 04:50), Max: 98.5 (01 Feb 2024 18:27)  HR: 78 (02 Feb 2024 04:50) (69 - 91)  BP: 144/79 (02 Feb 2024 04:50) (109/49 - 144/79)  BP(mean): --  RR: 17 (02 Feb 2024 04:50) (17 - 18)  SpO2: 99% (02 Feb 2024 04:50) (99% - 100%)    Parameters below as of 02 Feb 2024 04:50  Patient On (Oxygen Delivery Method): room air        Physical Exam:  General: No apparent distress  HEENT: EOMI, MMM  CVS: +S1/S2, RRR, no murmurs/rubs/gallops  Resp: CTA b/l. No crackles/wheezing  GI: Soft, NT/ND +BS  MSK: no swelling/warmth/erythema of the joints of the UE/LE  Neuro: AAOx3  Skin: no visible rashes    LABS:                        10.7   6.51  )-----------( 107      ( 02 Feb 2024 06:08 )             31.1     02-02    143  |  110<H>  |  14  ----------------------------<  211<H>  4.8   |  20<L>  |  0.30<L>    Ca    8.4      02 Feb 2024 06:08  Phos  2.8     02-02  Mg     2.20     02-02    TPro  x   /  Alb  2.8<L>  /  TBili  x   /  DBili  x   /  AST  x   /  ALT  x   /  AlkPhos  x   02-01      Urinalysis Basic - ( 02 Feb 2024 06:08 )    Color: x / Appearance: x / SG: x / pH: x  Gluc: 211 mg/dL / Ketone: x  / Bili: x / Urobili: x   Blood: x / Protein: x / Nitrite: x   Leuk Esterase: x / RBC: x / WBC x   Sq Epi: x / Non Sq Epi: x / Bacteria: x            Rheumatology Work Up    Sedimentation Rate, Erythrocyte: 23 mm/hr [4 - 25] (02-02-24 @ 06:08)  C-Reactive Protein, Serum: 20.4 mg/L *H* (02-02-24 @ 06:08)  C-Reactive Protein, Serum: 16.0 mg/L *H* (02-01-24 @ 05:51)  Creatine Kinase, Serum: 92 U/L [25 - 170] (01-30-24 @ 03:22)            RADIOLOGY & ADDITIONAL STUDIES:     STANISLAV AMIRAH  [id number]    HISTORY OF PRESENT ILLNESS:    78 yo F PMH IDDM, HTN, HLD, OP, Parkinson's, GCA BIBEMS from [facility]. Per documentation, patient was found on the floor after a fall. No HS or LOC. Attempted to contacted [facility] to obtain additional collateral. Of note, patient has had multiple falls recently. Patient is A&Ox0 so history obtained as per daughter Bina. Per daughter, patient is A&Ox3 at baseline and conversant. In the ED, patient is hemodynamically stable. CTH was negative for bleed or infarct but showed hydrocephalus of lateral and third ventricles and narrowed callosal angle suspicious for normal pressure hydrocephalus. CT A/P showed acute emphysematous cystitis. Fluid in the vagina with marked vaginal mucosal enhancement and questionable locules of luminal air inseparable from the adjacent emphysematous bladder wall. Cannot exclude vesicovaginal fistula. Labs significant gfor Na 150, K 3.0, WBC 15.4, troponin 70->54, ->147. UA in the ED +nitrites, moderate leukocyte esterase +protein, +ketones 4k WBC, 1.2k RBC, +glucose. Patient was treated with CTX, Zosyn, and 1L NS in the ED. (30 Jan 2024 16:38)    Rheumatology consulted for GCA management. Pt with newly dx GCA (Dec 2023)  Majority of history obtained via pt's daughters (one in person and one over the phone) and EMR.   Per [facility] in[facility] documentation "Has had multiple falls in the past per Neurologist as seen in Care Everywhere, thought to be 2/2 gait instability from Parkinson's. Patient reported fall on Tuesday (12/5) w/ head strike on right side, followed by blurred vision (worse on right). Again had another fall on day of presentation, unable to get up and found by building super (unknown amount of time), and brought in to the ED. Found to have Ecoli UTI s/p treatment w/ CTX. On initial day of presentation, reported blurred vision and on morning of 12/7 endorsed complete bilateral vision loss. Stroke code activated with negative CTH and CTA Head + neck. MRI Brain, orbit, and venogram without any acute findings as well. Found to have significant papilledema on ophthalmology evaluation, and elevated ESR/CRP. Started on high dose steroids to treat for possible GCA, and now pending temporal artery biopsy."  MRI brain at OSH did demonstrate ventriculomegaly, s/p LP with normal opening pressure.   S/p R temporal artery biopsy on 12/14/23 that demonstrated evidence of GCA.   S/p Pulse dose steroids, IVIG. Prednisone taper per documentation as follows Pred 60mg until Jan 4th, then Pred 50mg until Jan 12th followed by pred 40mg daily until follow up.   Also started on MTX 15mg weekly.   Per discussion with hospitalist, pt's med rec showed Pred 20mg daily, not pred 40mg. Per daughter, pt did not follow up with rheumatology or ophtho while at [facility].       Evaluated by ophtho on 2/1 for bitemporal vision loss in setting of known GCA.   Compared to last documented ophtho note from 12/18 at OSH, pt's vision worsening- pt previously had hand motion in both eyes, but now appears no light perception in both eyes.   Deue to concern for worsening vision and potential AMS 2/2 systemic GCA, recommended starting pt on pulse dose steroids.   Pt seen by rheum service s/p 1g solumedrol last night.     Pt's daughter now reporting she feels her mother's vision has improved slightly in the last 24 hours, is able to tell she is wearing flowers on her dress. Also reporting that her mental status has improved significantly (by about 90%).     Pt denies HA, jaw pain, neck pain, shoulder or hip stiffness/pain. No hx of weight loss or fevers prior to this episode.   Daughter does report recurrent UTIs where pt becomes altered.       Rheum ROS  See above       MEDICATIONS  (STANDING):  aspirin enteric coated 81 milliGRAM(s) Oral daily  calcium carbonate 1250 mG  + Vitamin D (OsCal 500 + D) 1 Tablet(s) Oral daily  carbidopa/levodopa  25/100 1.5 Tablet(s) Oral three times a day  carbidopa/levodopa CR 25/100 1 Tablet(s) Oral at bedtime  dextrose 5% + sodium chloride 0.45%. 1000 milliLiter(s) (100 mL/Hr) IV Continuous <Continuous>  dextrose 5%. 1000 milliLiter(s) (50 mL/Hr) IV Continuous <Continuous>  dextrose 50% Injectable 25 Gram(s) IV Push once  dextrose 50% Injectable 12.5 Gram(s) IV Push once  enoxaparin Injectable 40 milliGRAM(s) SubCutaneous every 24 hours  fenofibrate Tablet 145 milliGRAM(s) Oral daily  ferrous    sulfate Liquid 220 milliGRAM(s) Oral daily  folic acid 1 milliGRAM(s) Oral daily  glucagon  Injectable 1 milliGRAM(s) IntraMuscular once  insulin glargine Injectable (LANTUS) 5 Unit(s) SubCutaneous at bedtime  insulin lispro (ADMELOG) corrective regimen sliding scale   SubCutaneous three times a day before meals  metoprolol succinate ER 50 milliGRAM(s) Oral daily  mirtazapine 7.5 milliGRAM(s) Oral daily  pantoprazole    Tablet 40 milliGRAM(s) Oral before breakfast  polyethylene glycol 3350 17 Gram(s) Oral daily  senna 2 Tablet(s) Oral at bedtime  silver sulfADIAZINE 1% Cream 1 Application(s) Topical two times a day    MEDICATIONS  (PRN):  acetaminophen     Tablet .. 650 milliGRAM(s) Oral every 6 hours PRN Temp greater or equal to 38C (100.4F), Mild Pain (1 - 3)  dextrose Oral Gel 15 Gram(s) Oral once PRN Blood Glucose LESS THAN 70 milliGRAM(s)/deciliter      Allergies    No Known Allergies    Intolerances        PERTINENT MEDICATION HISTORY:    SOCIAL HISTORY:  OCCUPATION:  TRAVEL HISTORY:    FAMILY HISTORY:  Family history of diabetes mellitus in mother    FH: dementia  mother    FH: HTN (hypertension)  mother    Family history of stomach cancer  sister    Family history of oral cancer  sister    Family history of depression  mother        Vital Signs Last 24 Hrs  T(C): 36.4 (02 Feb 2024 04:50), Max: 36.9 (01 Feb 2024 18:27)  T(F): 97.5 (02 Feb 2024 04:50), Max: 98.5 (01 Feb 2024 18:27)  HR: 78 (02 Feb 2024 04:50) (69 - 91)  BP: 144/79 (02 Feb 2024 04:50) (109/49 - 144/79)  BP(mean): --  RR: 17 (02 Feb 2024 04:50) (17 - 18)  SpO2: 99% (02 Feb 2024 04:50) (99% - 100%)    Parameters below as of 02 Feb 2024 04:50  Patient On (Oxygen Delivery Method): room air        Physical Exam:  General: No apparent distress  HEENT: sclera clear, difficult to palpate pulse for temporal arteries   CVS: +S1/S2, RRR, no carotid bruits   Resp: CTAB, no increased WOB  GI: non-tender   MSK: no synovitis   Neuro: AAOx3  Skin: no visible rashes    LABS:                        10.7   6.51  )-----------( 107      ( 02 Feb 2024 06:08 )             31.1     02-02    143  |  110<H>  |  14  ----------------------------<  211<H>  4.8   |  20<L>  |  0.30<L>    Ca    8.4      02 Feb 2024 06:08  Phos  2.8     02-02  Mg     2.20     02-02    TPro  x   /  Alb  2.8<L>  /  TBili  x   /  DBili  x   /  AST  x   /  ALT  x   /  AlkPhos  x   02-01      Urinalysis Basic - ( 02 Feb 2024 06:08 )    Color: x / Appearance: x / SG: x / pH: x  Gluc: 211 mg/dL / Ketone: x  / Bili: x / Urobili: x   Blood: x / Protein: x / Nitrite: x   Leuk Esterase: x / RBC: x / WBC x   Sq Epi: x / Non Sq Epi: x / Bacteria: x      Rheumatology Work Up    Sedimentation Rate, Erythrocyte: 23 mm/hr [4 - 25] (02-02-24 @ 06:08)  C-Reactive Protein, Serum: 20.4 mg/L *H* (02-02-24 @ 06:08)  C-Reactive Protein, Serum: 16.0 mg/L *H* (02-01-24 @ 05:51)  Creatine Kinase, Serum: 92 U/L [25 - 170] (01-30-24 @ 03:22)      RADIOLOGY & ADDITIONAL STUDIES:  < from: CT Angio Head w/ IV Cont (01.30.24 @ 20:01) >  IMPRESSION:    1.   Right carotid system:    Atherosclerotic plaque carotid bulb without    hemodynamically significant stenosis.    2.   Left carotid system:     Atherosclerotic plaque carotid bulb without    hemodynamically significant stenosis.    3.   Vertebral circulation:    Patent. Segmental narrowings and fusiform   aneurysmal dilatation right vertebral artery distal foraminal V2 segment,   nonspecific, possibly atherosclerotic. Low-grade narrowing proximal   preforaminal V1 segment left vertebral artery is nonspecific, possibly   atherosclerotic.    4.  Anterior intracranial circulation:     Intracranial atherosclerosis   cavernous and clinoid segments of the internal carotid arteries, at least   mild to moderate on the right, lower greater on the left.    5.  Posterior intracranial circulation:    Severe vertebral basilar   attenuation reflects both developmentally small caliber and superimposed   high-grade multifocal stenoses, likely atherosclerotic, involving each   vertebral artery and the basilar artery. Likely reversal of flow in the   distal basilar artery to perfuse the superior cerebellar arteries    6.  Brain:   No infarct is identified.    < end of copied text >

## 2024-02-02 NOTE — PROGRESS NOTE ADULT - PROBLEM SELECTOR PLAN 10
IDDM2  Home regimen: glargine 15u qHS, admelog 7u TID qAC  hold lispro given poor po  increase lantus to 8   Monitor FSG, maintain FITZ, hypoglycemia protocol

## 2024-02-02 NOTE — PROGRESS NOTE ADULT - PROBLEM SELECTOR PLAN 2
Patient is A&Ox0 on admission, currently improved to ANO2-3. AMS likely 2/2 toxic metabolic derangements as below, but per neuro less likely neurovascular etiology. Also with known parkinson's disease for the past 8 months.  Per daughter at baseline patient is A&Ox3 and conversant but has been deteriorating since mid-jan.     CTH, CTA H/N with "No acute intracranial hemorrhage, mass, or large vessel occlusion. No aneurysm. prominent lateral and third ventricles; severely diminutive veribrobasilar system with small areas of minimal filling in   the basilar artery. Prominent b/l PCOMs"  fu MRI Brain and MRA  per neuro: start aspirin 81mg  optho eval as below  Management for electrolyte abnormalities as below and treat UTI as below  S/S eval- pt refused to participate with SLP. pt is alert but refuses to eat, discussed with patient and daughter, who wants to try pureed diet, Understands aspiration risk, wants to hold off ng tube rn.    Neuro checks PRINCIPAL DISCHARGE DIAGNOSIS  Diagnosis: NICOLETTE (acute kidney injury)  Assessment and Plan of Treatment: We noted elevated creatinine which may indicate some renal dysfunction. You were seen by nephrology and renal function has been stable. They recommend further outpatient follow up.      SECONDARY DISCHARGE DIAGNOSES  Diagnosis: Right inguinal hernia  Assessment and Plan of Treatment: You were seen by surgery and they recommend outpatient follow up.    Diagnosis: History of BPH  Assessment and Plan of Treatment: Outpatient urology follow up    Diagnosis: Acute UTI  Assessment and Plan of Treatment: Please complete antibiotics as prescribed & directed.

## 2024-02-02 NOTE — ADVANCED PRACTICE NURSE CONSULT - ASSESSMENT
General: A&Ox2-3, Lithuanian speaking daughters at bedside participating in care translating as per patient preference, bedbound, two person turn, incontinent of urine and stool- external female urinary  in place. Skin warm, dry with increased moisture in intertriginous folds, scattered areas of hyperpigmentation and hypopigmentation. Blanchable erythema on bilateral heels.     Sacrum- evolving Deep tissue pressure injury measuring [id number] exposing 15% deep purple maroon discoloration and 85% pink moist dermis. Small serosaginous drainage, no odor. Periwound skin moist with scattered erosions to gluteal fold secondary to IAD/MAD. Hypo/hyperpigmentation to bilateral buttocks. No s/s of infection. Goals of care: Monitor for tissue type changes, continue to offload, moisture management.

## 2024-02-02 NOTE — ADVANCED PRACTICE NURSE CONSULT - REASON FOR CONSULT
Patient seen on skin care rounds after wound care referral received for assessment of skin impairment and recommendations of topical management. Patient H/O of IDDM, HTN, HLD, OP, Parkinson's disease, GCA (recently diagnosed) BIBEMS from [facility] s/p Fall and AMS. found to have toxic/metabolic derangement wither hypernatremia and acute cystitis, and additionally cannot rule out stroke.   Chart reviewed: WBC 6.51, H/H 10.7/31.1, platelets 107, Serum albumin 2.8, A1C 7.5, [place] 16.

## 2024-02-02 NOTE — PROGRESS NOTE ADULT - SUBJECTIVE AND OBJECTIVE BOX
[facility] Department of Hospital Medicine    Patient is a 79y old  Female who presents with a chief complaint of ams, fall (31 Jan 2024 17:46)    OVERNIGHT EVENTS: No acute events overnight    SUBJECTIVE: VSS. afebrile. improved mentation. able to tolerate po with assist feed.     MEDICATIONS  (STANDING):  aspirin enteric coated 81 milliGRAM(s) Oral daily  calcium carbonate 1250 mG  + Vitamin D (OsCal 500 + D) 1 Tablet(s) Oral daily  carbidopa/levodopa  25/100 1.5 Tablet(s) Oral three times a day  carbidopa/levodopa CR 25/100 1 Tablet(s) Oral at bedtime  dextrose 5%. 1000 milliLiter(s) (50 mL/Hr) IV Continuous <Continuous>  dextrose 50% Injectable 25 Gram(s) IV Push once  dextrose 50% Injectable 12.5 Gram(s) IV Push once  enoxaparin Injectable 40 milliGRAM(s) SubCutaneous every 24 hours  fenofibrate Tablet 145 milliGRAM(s) Oral daily  ferrous    sulfate Liquid 220 milliGRAM(s) Oral daily  folic acid 1 milliGRAM(s) Oral daily  glucagon  Injectable 1 milliGRAM(s) IntraMuscular once  insulin glargine Injectable (LANTUS) 5 Unit(s) SubCutaneous at bedtime  insulin lispro (ADMELOG) corrective regimen sliding scale   SubCutaneous three times a day before meals  metoprolol succinate ER 50 milliGRAM(s) Oral daily  mirtazapine 7.5 milliGRAM(s) Oral daily  pantoprazole    Tablet 40 milliGRAM(s) Oral before breakfast  polyethylene glycol 3350 17 Gram(s) Oral daily  senna 2 Tablet(s) Oral at bedtime  silver sulfADIAZINE 1% Cream 1 Application(s) Topical two times a day  sodium chloride 0.45%. 1000 milliLiter(s) (75 mL/Hr) IV Continuous <Continuous>    MEDICATIONS  (PRN):  acetaminophen     Tablet .. 650 milliGRAM(s) Oral every 6 hours PRN Temp greater or equal to 38C (100.4F), Mild Pain (1 - 3)  dextrose Oral Gel 15 Gram(s) Oral once PRN Blood Glucose LESS THAN 70 milliGRAM(s)/deciliter      PHYSICAL EXAM:  Vital Signs Last 24 Hrs  T(C): 36.4 (02 Feb 2024 15:16), Max: 36.9 (01 Feb 2024 18:27)  T(F): 97.5 (02 Feb 2024 15:16), Max: 98.5 (01 Feb 2024 18:27)  HR: 74 (02 Feb 2024 15:16) (74 - 91)  BP: 119/62 (02 Feb 2024 15:16) (109/49 - 144/79)  BP(mean): --  RR: 18 (02 Feb 2024 15:16) (17 - 18)  SpO2: 100% (02 Feb 2024 15:16) (99% - 100%)    Parameters below as of 02 Feb 2024 15:16  Patient On (Oxygen Delivery Method): room air    CONSTITUTIONAL: NAD  RESPIRATORY: No increased work of breathing, CTAB, no wheezes or crackles appreciated  CARDIOVASCULAR: RRR, S1 and S2 present, no m/r/g  ABDOMEN: soft, NT, ND, bowel sounds present  EXTREMITIES: No LE edema  MUSCULOSKELETAL: no joint swelling, no tenderness to palpation  NEURO: A&Ox2, moving all extremities   segura with clear urine    LABS:                        10.7   6.51  )-----------( 107      ( 02 Feb 2024 06:08 )             31.1     02-02    143  |  110<H>  |  14  ----------------------------<  211<H>  4.8   |  20<L>  |  0.30<L>    Ca    8.4      02 Feb 2024 06:08  Phos  2.8     02-02  Mg     2.20     02-02    TPro  x   /  Alb  2.8<L>  /  TBili  x   /  DBili  x   /  AST  x   /  ALT  x   /  AlkPhos  x   02-01      Urinalysis Basic - ( 02 Feb 2024 06:08 )    Color: x / Appearance: x / SG: x / pH: x  Gluc: 211 mg/dL / Ketone: x  / Bili: x / Urobili: x   Blood: x / Protein: x / Nitrite: x   Leuk Esterase: x / RBC: x / WBC x   Sq Epi: x / Non Sq Epi: x / Bacteria: x      CAPILLARY BLOOD GLUCOSE      POCT Blood Glucose.: 259 mg/dL (02 Feb 2024 12:36)      RADIOLOGY & ADDITIONAL TESTS: Reviewed.        RADIOLOGY & ADDITIONAL TESTS:    Results Reviewed:   Imaging Personally Reviewed:  Electrocardiogram Personally Reviewed:    COORDINATION OF CARE:  Care Discussed with Consultants/Other Providers [Y/N]:  Prior or Outpatient Records Reviewed [Y/N]:

## 2024-02-03 LAB
ANION GAP SERPL CALC-SCNC: 11 MMOL/L — SIGNIFICANT CHANGE UP (ref 7–14)
BUN SERPL-MCNC: 17 MG/DL — SIGNIFICANT CHANGE UP (ref 7–23)
CALCIUM SERPL-MCNC: 8.3 MG/DL — LOW (ref 8.4–10.5)
CHLORIDE SERPL-SCNC: 107 MMOL/L — SIGNIFICANT CHANGE UP (ref 98–107)
CO2 SERPL-SCNC: 23 MMOL/L — SIGNIFICANT CHANGE UP (ref 22–31)
CREAT SERPL-MCNC: 0.33 MG/DL — LOW (ref 0.5–1.3)
EGFR: 105 ML/MIN/1.73M2 — SIGNIFICANT CHANGE UP
GLUCOSE BLDC GLUCOMTR-MCNC: 106 MG/DL — HIGH (ref 70–99)
GLUCOSE BLDC GLUCOMTR-MCNC: 142 MG/DL — HIGH (ref 70–99)
GLUCOSE BLDC GLUCOMTR-MCNC: 146 MG/DL — HIGH (ref 70–99)
GLUCOSE BLDC GLUCOMTR-MCNC: 252 MG/DL — HIGH (ref 70–99)
GLUCOSE SERPL-MCNC: 139 MG/DL — HIGH (ref 70–99)
HCT VFR BLD CALC: 27.2 % — LOW (ref 34.5–45)
HGB BLD-MCNC: 9.4 G/DL — LOW (ref 11.5–15.5)
MAGNESIUM SERPL-MCNC: 1.8 MG/DL — SIGNIFICANT CHANGE UP (ref 1.6–2.6)
MCHC RBC-ENTMCNC: 28.3 PG — SIGNIFICANT CHANGE UP (ref 27–34)
MCHC RBC-ENTMCNC: 34.6 GM/DL — SIGNIFICANT CHANGE UP (ref 32–36)
MCV RBC AUTO: 81.9 FL — SIGNIFICANT CHANGE UP (ref 80–100)
NRBC # BLD: 0 /100 WBCS — SIGNIFICANT CHANGE UP (ref 0–0)
NRBC # FLD: 0.02 K/UL — HIGH (ref 0–0)
PHOSPHATE SERPL-MCNC: 2.2 MG/DL — LOW (ref 2.5–4.5)
PLATELET # BLD AUTO: 117 K/UL — LOW (ref 150–400)
POTASSIUM SERPL-MCNC: 3.6 MMOL/L — SIGNIFICANT CHANGE UP (ref 3.5–5.3)
POTASSIUM SERPL-SCNC: 3.6 MMOL/L — SIGNIFICANT CHANGE UP (ref 3.5–5.3)
RBC # BLD: 3.32 M/UL — LOW (ref 3.8–5.2)
RBC # FLD: 21.3 % — HIGH (ref 10.3–14.5)
SODIUM SERPL-SCNC: 141 MMOL/L — SIGNIFICANT CHANGE UP (ref 135–145)
WBC # BLD: 9.55 K/UL — SIGNIFICANT CHANGE UP (ref 3.8–10.5)
WBC # FLD AUTO: 9.55 K/UL — SIGNIFICANT CHANGE UP (ref 3.8–10.5)

## 2024-02-03 PROCEDURE — 99233 SBSQ HOSP IP/OBS HIGH 50: CPT

## 2024-02-03 RX ADMIN — Medication 50 MILLIGRAM(S): at 12:30

## 2024-02-03 RX ADMIN — Medication 220 MILLIGRAM(S): at 12:30

## 2024-02-03 RX ADMIN — Medication 1 APPLICATION(S): at 17:41

## 2024-02-03 RX ADMIN — CARBIDOPA AND LEVODOPA 1.5 TABLET(S): 25; 100 TABLET ORAL at 15:26

## 2024-02-03 RX ADMIN — CARBIDOPA AND LEVODOPA 1.5 TABLET(S): 25; 100 TABLET ORAL at 12:31

## 2024-02-03 RX ADMIN — Medication 100 MILLIGRAM(S): at 06:32

## 2024-02-03 RX ADMIN — ENOXAPARIN SODIUM 40 MILLIGRAM(S): 100 INJECTION SUBCUTANEOUS at 21:14

## 2024-02-03 RX ADMIN — Medication 6: at 12:27

## 2024-02-03 RX ADMIN — Medication 1 TABLET(S): at 12:30

## 2024-02-03 RX ADMIN — POLYETHYLENE GLYCOL 3350 17 GRAM(S): 17 POWDER, FOR SOLUTION ORAL at 12:34

## 2024-02-03 RX ADMIN — Medication 1 APPLICATION(S): at 06:32

## 2024-02-03 RX ADMIN — CEFTRIAXONE 100 MILLIGRAM(S): 500 INJECTION, POWDER, FOR SOLUTION INTRAMUSCULAR; INTRAVENOUS at 07:04

## 2024-02-03 RX ADMIN — SODIUM CHLORIDE 75 MILLILITER(S): 9 INJECTION, SOLUTION INTRAVENOUS at 12:35

## 2024-02-03 RX ADMIN — Medication 81 MILLIGRAM(S): at 12:31

## 2024-02-03 RX ADMIN — Medication 145 MILLIGRAM(S): at 12:30

## 2024-02-03 RX ADMIN — MIRTAZAPINE 7.5 MILLIGRAM(S): 45 TABLET, ORALLY DISINTEGRATING ORAL at 12:30

## 2024-02-03 RX ADMIN — Medication 1 MILLIGRAM(S): at 12:34

## 2024-02-03 RX ADMIN — INSULIN GLARGINE 8 UNIT(S): 100 INJECTION, SOLUTION SUBCUTANEOUS at 22:55

## 2024-02-03 NOTE — PROGRESS NOTE ADULT - PROBLEM SELECTOR PLAN 10
IDDM2  Home regimen: glargine 15u qHS, admelog 7u TID qAC  Cont lantus 8 qhs and iss  Monitor FSG, maintain FITZ, hypoglycemia protocol

## 2024-02-03 NOTE — PROGRESS NOTE ADULT - NSPROGADDITIONALINFOA_GEN_ALL_CORE
dvt ppx: lovenox sq    Dispo: pending MRI and MRA. PT recs but pt's family wants to take patient home when medically stable for dc.

## 2024-02-03 NOTE — PROGRESS NOTE ADULT - PROBLEM SELECTOR PLAN 2
Patient is A&Ox0 on admission, currently improved to ANO2-3. AMS likely 2/2 toxic metabolic derangements as below, but per neuro less likely neurovascular etiology. Also with known parkinson's disease for the past 8 months.  Per daughter at baseline patient is A&Ox3 and conversant but has been deteriorating since mid-jan.     CTH, CTA H/N with "No acute intracranial hemorrhage, mass, or large vessel occlusion. No aneurysm. prominent lateral and third ventricles; severely diminutive veribrobasilar system with small areas of minimal filling in   the basilar artery. Prominent b/l PCOMs"  fu MRI Brain and MRA H  per neuro: start aspirin 81mg  optho eval as below  Management for electrolyte abnormalities as below and treat UTI as below  S/S eval- pt refused to participate with SLP. pt is alert but refuses to eat, discussed with patient and daughter, who wants to try pureed diet, Understands aspiration risk, wants to hold off ng tube   Neuro checks

## 2024-02-03 NOTE — PROGRESS NOTE ADULT - SUBJECTIVE AND OBJECTIVE BOX
[facility] Department of Hospital Medicine    Patient is a 79y old  Female who presents with a chief complaint of ams, fall.  OVERNIGHT EVENTS: No acute events overnight    SUBJECTIVE:     MEDICATIONS  (STANDING):  aspirin enteric coated 81 milliGRAM(s) Oral daily  calcium carbonate 1250 mG  + Vitamin D (OsCal 500 + D) 1 Tablet(s) Oral daily  carbidopa/levodopa  25/100 1.5 Tablet(s) Oral three times a day  carbidopa/levodopa CR 25/100 1 Tablet(s) Oral at bedtime  dextrose 5%. 1000 milliLiter(s) (50 mL/Hr) IV Continuous <Continuous>  dextrose 50% Injectable 25 Gram(s) IV Push once  dextrose 50% Injectable 12.5 Gram(s) IV Push once  enoxaparin Injectable 40 milliGRAM(s) SubCutaneous every 24 hours  fenofibrate Tablet 145 milliGRAM(s) Oral daily  ferrous    sulfate Liquid 220 milliGRAM(s) Oral daily  folic acid 1 milliGRAM(s) Oral daily  glucagon  Injectable 1 milliGRAM(s) IntraMuscular once  insulin glargine Injectable (LANTUS) 8 Unit(s) SubCutaneous at bedtime  insulin lispro (ADMELOG) corrective regimen sliding scale   SubCutaneous three times a day before meals  methylPREDNISolone sodium succinate IVPB 500 milliGRAM(s) IV Intermittent daily  metoprolol succinate ER 50 milliGRAM(s) Oral daily  mirtazapine 7.5 milliGRAM(s) Oral daily  pantoprazole    Tablet 40 milliGRAM(s) Oral before breakfast  polyethylene glycol 3350 17 Gram(s) Oral daily  senna 2 Tablet(s) Oral at bedtime  silver sulfADIAZINE 1% Cream 1 Application(s) Topical two times a day  sodium chloride 0.45%. 1000 milliLiter(s) (75 mL/Hr) IV Continuous <Continuous>    MEDICATIONS  (PRN):  acetaminophen     Tablet .. 650 milliGRAM(s) Oral every 6 hours PRN Temp greater or equal to 38C (100.4F), Mild Pain (1 - 3)  dextrose Oral Gel 15 Gram(s) Oral once PRN Blood Glucose LESS THAN 70 milliGRAM(s)/deciliter      PHYSICAL EXAM:  Vital Signs Last 24 Hrs  T(C): 36.4 (03 Feb 2024 05:15), Max: 36.4 (02 Feb 2024 15:16)  T(F): 97.5 (03 Feb 2024 05:15), Max: 97.5 (02 Feb 2024 15:16)  HR: 71 (03 Feb 2024 05:15) (71 - 74)  BP: 141/70 (03 Feb 2024 05:15) (118/69 - 141/70)  BP(mean): --  RR: 18 (03 Feb 2024 05:15) (17 - 18)  SpO2: 100% (03 Feb 2024 05:15) (99% - 100%)    Parameters below as of 03 Feb 2024 05:15  Patient On (Oxygen Delivery Method): room air    CONSTITUTIONAL: NAD  EYE: dilated pupil, vision loss  RESPIRATORY: No increased work of breathing, CTAB, no wheezes or crackles appreciated  CARDIOVASCULAR: RRR, S1 and S2 present, no m/r/g  ABDOMEN: soft, NT, ND, bowel sounds present  EXTREMITIES: No LE edema  MUSCULOSKELETAL: no joint swelling, no tenderness to palpation  NEURO: A&Ox2, moving all extremities   segura with clear urine    LABS:                          10.7   6.51  )-----------( 107      ( 02 Feb 2024 06:08 )             31.1     02-02    143  |  110<H>  |  14  ----------------------------<  211<H>  4.8   |  20<L>  |  0.30<L>    Ca    8.4      02 Feb 2024 06:08  Phos  2.8     02-02  Mg     2.20     02-02        Urinalysis Basic - ( 02 Feb 2024 06:08 )    Color: x / Appearance: x / SG: x / pH: x  Gluc: 211 mg/dL / Ketone: x  / Bili: x / Urobili: x   Blood: x / Protein: x / Nitrite: x   Leuk Esterase: x / RBC: x / WBC x   Sq Epi: x / Non Sq Epi: x / Bacteria: x      CAPILLARY BLOOD GLUCOSE      POCT Blood Glucose.: 106 mg/dL (03 Feb 2024 08:16)      RADIOLOGY & ADDITIONAL TESTS: Reviewed.    POCT Blood Glucose.: 259 mg/dL (02 Feb 2024 12:36)      RADIOLOGY & ADDITIONAL TESTS:    Results Reviewed:   Imaging Personally Reviewed:  Electrocardiogram Personally Reviewed:    COORDINATION OF CARE:  Care Discussed with Consultants/Other Providers [Y/N]:  Prior or Outpatient Records Reviewed [Y/N]:   [facility] Department of Hospital Medicine    Patient is a 79y old  Female who presents with a chief complaint of ams, fall.    OVERNIGHT EVENTS: No acute events overnight    SUBJECTIVE: NAEO. improved mentation.     MEDICATIONS  (STANDING):  aspirin enteric coated 81 milliGRAM(s) Oral daily  calcium carbonate 1250 mG  + Vitamin D (OsCal 500 + D) 1 Tablet(s) Oral daily  carbidopa/levodopa  25/100 1.5 Tablet(s) Oral three times a day  carbidopa/levodopa CR 25/100 1 Tablet(s) Oral at bedtime  dextrose 5%. 1000 milliLiter(s) (50 mL/Hr) IV Continuous <Continuous>  dextrose 50% Injectable 25 Gram(s) IV Push once  dextrose 50% Injectable 12.5 Gram(s) IV Push once  enoxaparin Injectable 40 milliGRAM(s) SubCutaneous every 24 hours  fenofibrate Tablet 145 milliGRAM(s) Oral daily  ferrous    sulfate Liquid 220 milliGRAM(s) Oral daily  folic acid 1 milliGRAM(s) Oral daily  glucagon  Injectable 1 milliGRAM(s) IntraMuscular once  insulin glargine Injectable (LANTUS) 8 Unit(s) SubCutaneous at bedtime  insulin lispro (ADMELOG) corrective regimen sliding scale   SubCutaneous three times a day before meals  methylPREDNISolone sodium succinate IVPB 500 milliGRAM(s) IV Intermittent daily  metoprolol succinate ER 50 milliGRAM(s) Oral daily  mirtazapine 7.5 milliGRAM(s) Oral daily  pantoprazole    Tablet 40 milliGRAM(s) Oral before breakfast  polyethylene glycol 3350 17 Gram(s) Oral daily  senna 2 Tablet(s) Oral at bedtime  silver sulfADIAZINE 1% Cream 1 Application(s) Topical two times a day  sodium chloride 0.45%. 1000 milliLiter(s) (75 mL/Hr) IV Continuous <Continuous>    MEDICATIONS  (PRN):  acetaminophen     Tablet .. 650 milliGRAM(s) Oral every 6 hours PRN Temp greater or equal to 38C (100.4F), Mild Pain (1 - 3)  dextrose Oral Gel 15 Gram(s) Oral once PRN Blood Glucose LESS THAN 70 milliGRAM(s)/deciliter      PHYSICAL EXAM:  Vital Signs Last 24 Hrs  T(C): 36.4 (03 Feb 2024 05:15), Max: 36.4 (02 Feb 2024 15:16)  T(F): 97.5 (03 Feb 2024 05:15), Max: 97.5 (02 Feb 2024 15:16)  HR: 71 (03 Feb 2024 05:15) (71 - 74)  BP: 141/70 (03 Feb 2024 05:15) (118/69 - 141/70)  BP(mean): --  RR: 18 (03 Feb 2024 05:15) (17 - 18)  SpO2: 100% (03 Feb 2024 05:15) (99% - 100%)    Parameters below as of 03 Feb 2024 05:15  Patient On (Oxygen Delivery Method): room air    CONSTITUTIONAL: NAD  EYE: dilated pupil, vision loss  RESPIRATORY: No increased work of breathing, CTAB, no wheezes or crackles appreciated  CARDIOVASCULAR: RRR, S1 and S2 present, no m/r/g  ABDOMEN: soft, NT, ND, bowel sounds present  EXTREMITIES: No LE edema  MUSCULOSKELETAL: no joint swelling, no tenderness to palpation  NEURO: A&Ox2, moving all extremities   segura with clear urine    LABS:                          10.7   6.51  )-----------( 107      ( 02 Feb 2024 06:08 )             31.1     02-02    143  |  110<H>  |  14  ----------------------------<  211<H>  4.8   |  20<L>  |  0.30<L>    Ca    8.4      02 Feb 2024 06:08  Phos  2.8     02-02  Mg     2.20     02-02        Urinalysis Basic - ( 02 Feb 2024 06:08 )    Color: x / Appearance: x / SG: x / pH: x  Gluc: 211 mg/dL / Ketone: x  / Bili: x / Urobili: x   Blood: x / Protein: x / Nitrite: x   Leuk Esterase: x / RBC: x / WBC x   Sq Epi: x / Non Sq Epi: x / Bacteria: x      CAPILLARY BLOOD GLUCOSE      POCT Blood Glucose.: 106 mg/dL (03 Feb 2024 08:16)      RADIOLOGY & ADDITIONAL TESTS: Reviewed.    POCT Blood Glucose.: 259 mg/dL (02 Feb 2024 12:36)      RADIOLOGY & ADDITIONAL TESTS:    Results Reviewed:   Imaging Personally Reviewed:  Electrocardiogram Personally Reviewed:    COORDINATION OF CARE:  Care Discussed with Consultants/Other Providers [Y/N]:  Prior or Outpatient Records Reviewed [Y/N]:

## 2024-02-04 ENCOUNTER — TRANSCRIPTION ENCOUNTER (OUTPATIENT)
Age: 79
End: 2024-02-04

## 2024-02-04 LAB
ANION GAP SERPL CALC-SCNC: 10 MMOL/L — SIGNIFICANT CHANGE UP (ref 7–14)
ANION GAP SERPL CALC-SCNC: 9 MMOL/L — SIGNIFICANT CHANGE UP (ref 7–14)
BUN SERPL-MCNC: 11 MG/DL — SIGNIFICANT CHANGE UP (ref 7–23)
BUN SERPL-MCNC: 17 MG/DL — SIGNIFICANT CHANGE UP (ref 7–23)
CALCIUM SERPL-MCNC: 6.1 MG/DL — CRITICAL LOW (ref 8.4–10.5)
CALCIUM SERPL-MCNC: 7.9 MG/DL — LOW (ref 8.4–10.5)
CHLORIDE SERPL-SCNC: 106 MMOL/L — SIGNIFICANT CHANGE UP (ref 98–107)
CHLORIDE SERPL-SCNC: 96 MMOL/L — LOW (ref 98–107)
CO2 SERPL-SCNC: 19 MMOL/L — LOW (ref 22–31)
CO2 SERPL-SCNC: 25 MMOL/L — SIGNIFICANT CHANGE UP (ref 22–31)
CREAT SERPL-MCNC: 0.27 MG/DL — LOW (ref 0.5–1.3)
CREAT SERPL-MCNC: 0.49 MG/DL — LOW (ref 0.5–1.3)
EGFR: 110 ML/MIN/1.73M2 — SIGNIFICANT CHANGE UP
EGFR: 95 ML/MIN/1.73M2 — SIGNIFICANT CHANGE UP
GLUCOSE BLDC GLUCOMTR-MCNC: 187 MG/DL — HIGH (ref 70–99)
GLUCOSE BLDC GLUCOMTR-MCNC: 211 MG/DL — HIGH (ref 70–99)
GLUCOSE BLDC GLUCOMTR-MCNC: 96 MG/DL — SIGNIFICANT CHANGE UP (ref 70–99)
GLUCOSE SERPL-MCNC: 178 MG/DL — HIGH (ref 70–99)
GLUCOSE SERPL-MCNC: 99 MG/DL — SIGNIFICANT CHANGE UP (ref 70–99)
HCT VFR BLD CALC: 23.3 % — LOW (ref 34.5–45)
HGB BLD-MCNC: 8.2 G/DL — LOW (ref 11.5–15.5)
MCHC RBC-ENTMCNC: 28.5 PG — SIGNIFICANT CHANGE UP (ref 27–34)
MCHC RBC-ENTMCNC: 35.2 GM/DL — SIGNIFICANT CHANGE UP (ref 32–36)
MCV RBC AUTO: 80.9 FL — SIGNIFICANT CHANGE UP (ref 80–100)
NRBC # BLD: 0 /100 WBCS — SIGNIFICANT CHANGE UP (ref 0–0)
NRBC # FLD: 0 K/UL — SIGNIFICANT CHANGE UP (ref 0–0)
PLATELET # BLD AUTO: 105 K/UL — LOW (ref 150–400)
POTASSIUM SERPL-MCNC: 2.5 MMOL/L — CRITICAL LOW (ref 3.5–5.3)
POTASSIUM SERPL-MCNC: 3.5 MMOL/L — SIGNIFICANT CHANGE UP (ref 3.5–5.3)
POTASSIUM SERPL-SCNC: 2.5 MMOL/L — CRITICAL LOW (ref 3.5–5.3)
POTASSIUM SERPL-SCNC: 3.5 MMOL/L — SIGNIFICANT CHANGE UP (ref 3.5–5.3)
RBC # BLD: 2.88 M/UL — LOW (ref 3.8–5.2)
RBC # FLD: 21.1 % — HIGH (ref 10.3–14.5)
SODIUM SERPL-SCNC: 124 MMOL/L — LOW (ref 135–145)
SODIUM SERPL-SCNC: 141 MMOL/L — SIGNIFICANT CHANGE UP (ref 135–145)
WBC # BLD: 8.06 K/UL — SIGNIFICANT CHANGE UP (ref 3.8–10.5)
WBC # FLD AUTO: 8.06 K/UL — SIGNIFICANT CHANGE UP (ref 3.8–10.5)

## 2024-02-04 PROCEDURE — 99233 SBSQ HOSP IP/OBS HIGH 50: CPT

## 2024-02-04 RX ORDER — POTASSIUM CHLORIDE 20 MEQ
40 PACKET (EA) ORAL ONCE
Refills: 0 | Status: COMPLETED | OUTPATIENT
Start: 2024-02-04 | End: 2024-02-04

## 2024-02-04 RX ADMIN — Medication 100 MILLIGRAM(S): at 05:39

## 2024-02-04 RX ADMIN — Medication 50 MILLIGRAM(S): at 09:31

## 2024-02-04 RX ADMIN — CARBIDOPA AND LEVODOPA 1.5 TABLET(S): 25; 100 TABLET ORAL at 09:30

## 2024-02-04 RX ADMIN — Medication 1 MILLIGRAM(S): at 13:47

## 2024-02-04 RX ADMIN — Medication 4: at 13:04

## 2024-02-04 RX ADMIN — Medication 1 APPLICATION(S): at 05:39

## 2024-02-04 RX ADMIN — Medication 1 TABLET(S): at 13:50

## 2024-02-04 RX ADMIN — ENOXAPARIN SODIUM 40 MILLIGRAM(S): 100 INJECTION SUBCUTANEOUS at 21:11

## 2024-02-04 RX ADMIN — MIRTAZAPINE 7.5 MILLIGRAM(S): 45 TABLET, ORALLY DISINTEGRATING ORAL at 13:49

## 2024-02-04 RX ADMIN — SENNA PLUS 2 TABLET(S): 8.6 TABLET ORAL at 21:11

## 2024-02-04 RX ADMIN — CARBIDOPA AND LEVODOPA 1.5 TABLET(S): 25; 100 TABLET ORAL at 21:11

## 2024-02-04 RX ADMIN — INSULIN GLARGINE 8 UNIT(S): 100 INJECTION, SOLUTION SUBCUTANEOUS at 22:19

## 2024-02-04 RX ADMIN — Medication 81 MILLIGRAM(S): at 13:47

## 2024-02-04 RX ADMIN — POLYETHYLENE GLYCOL 3350 17 GRAM(S): 17 POWDER, FOR SOLUTION ORAL at 13:49

## 2024-02-04 RX ADMIN — PANTOPRAZOLE SODIUM 40 MILLIGRAM(S): 20 TABLET, DELAYED RELEASE ORAL at 09:29

## 2024-02-04 RX ADMIN — Medication 220 MILLIGRAM(S): at 13:46

## 2024-02-04 RX ADMIN — CARBIDOPA AND LEVODOPA 1.5 TABLET(S): 25; 100 TABLET ORAL at 15:47

## 2024-02-04 RX ADMIN — Medication 145 MILLIGRAM(S): at 13:47

## 2024-02-04 NOTE — DISCHARGE NOTE PROVIDER - NSDCFUADDAPPT_GEN_ALL_CORE_FT
Please follow-up with his/her ophthalmologist at [facility] in one week    [facility] Department of Ophthalmology  [address]  [place] [address]  [phone]   Please call primary care provider within 1-2 weeks for follow up appointment.     Please follow-up with opthalmologist at [facility] in one week at:  [facility] Department of Ophthalmology  [address]  [place] [address]  [phone].  Please call for an appointment.    Follow up with Dr. Escobar rheumatology within 2 weeks, please call for an appointment.     follow-up with Neurology with any of the following: Dr. Smooth Andrade MD. [address] [place] [place], [place]. [phone]. Dr. Liza Mccain MD. [address] [place] [place], [place]. [phone].  Please call for an appointment.     If you would like to establish care with outpatient psychiatric care, daughter can call outpatient geriatric psychiatry at [phone] x ext- 2    [facility]   [phone]  [address] [place] [address]  Please call for an appointment within 1 week    [facility]   [phone]  Fax: [phone]  [address] [place] [phone] Please call primary care provider within 1-2 weeks for follow up appointment.     Please follow-up with Ophthalmologist at [facility] in one week at:  [facility] Department of Ophthalmology  [address]  [place] [address]  [phone].  Please call for an appointment.    Follow up with Dr. Escobar rheumatology within 2 weeks, please call for an appointment.     follow-up with Neurology with any of the following: Dr. Smooth Andrade MD. [address] [place], [place]. [phone]. Dr. Liza Mccain MD. [address] [place], [place]. [phone].  Please call for an appointment.     If you would like to establish care with outpatient psychiatric care, daughter can call outpatient geriatric psychiatry at [phone] x ext- 2    [facility]   [phone]  [address] [place] [address]  Please call for an appointment within 1 week    [facility]   [phone]  Fax: [phone]  [address] [place] [address]

## 2024-02-04 NOTE — DISCHARGE NOTE PROVIDER - CARE PROVIDER_API CALL
Xin Escobar  Rheumatology  [address] [address]  Phone: [phone]  Fax: [phone]  Follow Up Time: 2 weeks

## 2024-02-04 NOTE — PROGRESS NOTE ADULT - ASSESSMENT
78 yo F PMH IDDM, HTN, HLD, OP, Parkinson's disease, GCA (recently diagnosed) BIBEMS from [facility] s/p Fall and AMS. found to have toxic/metabolic derangement with hypernatremia and acute cystitis, and additionally with cf GCA flare with worsening vision.

## 2024-02-04 NOTE — DISCHARGE NOTE PROVIDER - NSDCFUADDINST_GEN_ALL_CORE_FT
Wound care: Sacrum to gluteal fold and bilateral buttock- Cleanse with skin cleanser, pat dry. Apply TRIAD paste twice a day and PRN with incontinent episodes. With episodes of incontinence only remove soiled layer of Triad, then reinforce with thin layer.     Your hemoglobin was mildly decreased during your hospital stay. Please have repeat CBC checked within 1 week of discharge and follow up with your primary care provider.   Your phosphorus level was mildly decreased and was repleted before you were discharged. Please have repeat basic metabolic panel with phosphorus level checked within 1 week and follow up with your primary care provider.     Your CTA showed:  Vertebral circulation:    Patent. Segmental narrowings and fusiform   aneurysmal dilatation right vertebral artery distal foraminal V2 segment,   nonspecific, possibly atherosclerotic. Low-grade narrowing proximal   preforaminal V1 segment left vertebral artery is nonspecific, possibly   atherosclerotic.  Please follow up with neurology as outpatient as directed for further care and management of these results.

## 2024-02-04 NOTE — DISCHARGE NOTE PROVIDER - NSDCCPCAREPLAN_GEN_ALL_CORE_FT
PRINCIPAL DISCHARGE DIAGNOSIS  Diagnosis: AMS (altered mental status)  Assessment and Plan of Treatment: You were admitted for evaluation of Altered mental status. You were found to have urinary tract infection and severe dehydration leading to electrolyte disturbances. You were given antibiotics and IV fluids. You were also evalauted by Neurologist. MRI Brain showed __________. Please take your medication as prescribed and follow up in the clinic.      SECONDARY DISCHARGE DIAGNOSES  Diagnosis: Acute cystitis  Assessment and Plan of Treatment: You recevied IV antibiotics for Urinary tract infection.    Diagnosis: GCA (giant cell arteritis)  Assessment and Plan of Treatment: You were treated with high dose steroids for suspected flare of your giant cell arteritis. Please continue taking prednisone as advise and follow up with your rheumatologist.     PRINCIPAL DISCHARGE DIAGNOSIS  Diagnosis: AMS (altered mental status)  Assessment and Plan of Treatment: you were treated for urinary tract infection and dehydration      SECONDARY DISCHARGE DIAGNOSES  Diagnosis: Acute cystitis  Assessment and Plan of Treatment: You recevied IV antibiotics for Urinary tract infection.    Diagnosis: GCA (giant cell arteritis)  Assessment and Plan of Treatment: You were treated with high dose steroids for suspected flare of your giant cell arteritis. Please continue taking prednisone 50 mg for 2 weeks and then 40 mg once a day for one month as instructed, please f/up with rheumatology and ophthalmology as instructed     PRINCIPAL DISCHARGE DIAGNOSIS  Diagnosis: AMS (altered mental status)  Assessment and Plan of Treatment: you were treated for urinary tract infection and dehydration      SECONDARY DISCHARGE DIAGNOSES  Diagnosis: Acute cystitis  Assessment and Plan of Treatment: You recevied IV antibiotics for Urinary tract infection.    Diagnosis: Fall  Assessment and Plan of Treatment: PT recommended Rehab  Family decline and wishes to go home    Diagnosis: Hyperlipidemia  Assessment and Plan of Treatment: Continue cholesterol control medications. Continue DASH diet. Follow up with your PCP within 1 week of discharge for further management and monitoring of lipid and cholesterol panels.      Diagnosis: GCA (giant cell arteritis)  Assessment and Plan of Treatment: You were treated with high dose steroids for suspected flare of your giant cell arteritis. Please continue taking prednisone 50 mg for 2 weeks and then 40 mg once a day for one month as instructed, please f/up with rheumatology and ophthalmology as instructed    Diagnosis: Hypertension  Assessment and Plan of Treatment: Continue blood pressure medication regimen as directed. Monitor for any visual changes, headaches or dizziness.  Monitor blood pressure regularly.  Follow up with your PCP for further management for high blood pressure.       PRINCIPAL DISCHARGE DIAGNOSIS  Diagnosis: AMS (altered mental status)  Assessment and Plan of Treatment: Your CT head, CT angio head and neck showed "No acute intracranial hemorrhage, mass, or large vessel occlusion. No aneurysm. prominent lateral and third ventricles; severely diminutive veribrobasilar system with small areas of minimal filling in the basilar artery. Prominent b/l PCOMs" Your MRI/MRA brain/orbit: Unremarkable MR of the orbits. No acute infarct. intracranial atherosclerosis cavernous and clinoid segments of the internal carotid arteries, at least mild-to-moderate on the right, more mild on the left. Continue your ASA and cholesterol medicine. You were also found to have urinary tract infection which was likely contributory to your confusion and recieved antibiotics.  Outpatietn follow up with primary care and neurologist recommended.         SECONDARY DISCHARGE DIAGNOSES  Diagnosis: Acute cystitis  Assessment and Plan of Treatment: You completed a course of antibiotics for a urinary infection. To help prevent future infections maintain healthy hygiene and avoid taking long baths. Wipe from front to back and empty your bladder frequently by keeping yourself properly hydrated. Monitor for signs/symptoms of infection, such as, fever/chills, burning/pain with urination, urinary frequency/hesitancy, cloudy urine, or blood in urine and follow-up with your primary care provider as outpatient for further care as well as urology.    Diagnosis: GCA (giant cell arteritis)  Assessment and Plan of Treatment: You were treated with high dose steroids for suspected flare of your giant cell arteritis. Please continue taking prednisone 50 mg for 2 weeks and then 40 mg once a day for one month as instructed, please f/up with rheumatology and ophthalmology as instructed. Outpt f/u with Dr. Xin Escobar rheumatology. outpt follow up with [facility] Department of Ophthalmology. [address] [facility]  [place] [address] [phone].    Diagnosis: Fall  Assessment and Plan of Treatment: PT recommended Rehab  Your Family declined and wishes to go home with home services.    Diagnosis: Hypertension  Assessment and Plan of Treatment: Continue blood pressure medication regimen as directed. Monitor for any visual changes, headaches or dizziness.  Monitor blood pressure regularly.  Follow up with your PCP for further management for high blood pressure.      Diagnosis: Hypernatremia  Assessment and Plan of Treatment: Your sodium level was elevated and resolved to normal after hydration. Please have repeat basic metabolic panel drawn within 1 week and follow up with your primary care doctor for further management.    Diagnosis: Elevated troponin  Assessment and Plan of Treatment: Your heart enzymes were elevated and you were seen by cardiology.   given functional status you were deemed not a candidate for invasive evaluation and ischemic evaluation deferred. Follow up as outpatient with cardiologist.    Diagnosis: Vesicovaginal fistula  Assessment and Plan of Treatment: Your CT scan showed acute emphysematous cystitis. Fluid in the vagina with marked vaginal mucosal enhancement and questionable locules of luminal air inseparable from the adjacent emphysematous bladder wall. Cannot exclude vesicovaginal fistula. You were evaluated by urology and no surgical treatment was recommended. Please follow up as outpatient with urology upon discharge within 1-2 weeks.    Diagnosis: Hyperlipidemia  Assessment and Plan of Treatment: Continue cholesterol control medications. Continue DASH diet. Follow up with your PCP within 1 week of discharge for further management and monitoring of lipid and cholesterol panels.       PRINCIPAL DISCHARGE DIAGNOSIS  Diagnosis: AMS (altered mental status)  Assessment and Plan of Treatment: You had a CT head and neck as well as MRI brain and orbits which were reassuring. Continue your ASA and cholesterol medicine. Please follow up with neurology as an outpatient and geriatric psych You were also found to have urinary tract infection which was likely contributory to your confusion and recieved antibiotics.  Outpatietn follow up with primary care and neurologist recommended.         SECONDARY DISCHARGE DIAGNOSES  Diagnosis: Acute cystitis  Assessment and Plan of Treatment: You completed a course of antibiotics for a urinary infection. To help prevent future infections maintain healthy hygiene and avoid taking long baths. Wipe from front to back and empty your bladder frequently by keeping yourself properly hydrated. Monitor for signs/symptoms of infection, such as, fever/chills, burning/pain with urination, urinary frequency/hesitancy, cloudy urine, or blood in urine and follow-up with your primary care provider as outpatient for further care as well as urology.    Diagnosis: GCA (giant cell arteritis)  Assessment and Plan of Treatment: You were treated with high dose steroids for suspected flare of your giant cell arteritis. Please continue taking prednisone 50 mg for 2 weeks and then 40 mg once a day for one month as instructed, please f/up with rheumatology and ophthalmology as instructed. Outpt f/u with Dr. Xin Escobar rheumatology. outpt follow up with [facility] Department of Ophthalmology. [address] [place] [address]. [address]  [place] [address] [phone].    Diagnosis: Fall  Assessment and Plan of Treatment: PT recommended Rehab  Your Family declined and wishes to go home with home services.    Diagnosis: Hypertension  Assessment and Plan of Treatment: Continue blood pressure medication regimen as directed. Monitor for any visual changes, headaches or dizziness.  Monitor blood pressure regularly.  Follow up with your PCP for further management for high blood pressure.      Diagnosis: Hyperlipidemia  Assessment and Plan of Treatment: Continue cholesterol control medications. Continue DASH diet. Follow up with your PCP within 1 week of discharge for further management and monitoring of lipid and cholesterol panels.      Diagnosis: Vesicovaginal fistula  Assessment and Plan of Treatment: Your CT scan showed acute emphysematous cystitis and you might have vesicovaginal fistula. You were evaluated by urology and no surgical treatment was recommended. Please follow up as outpatient with urology upon discharge within 1-2 weeks.    Diagnosis: Elevated troponin  Assessment and Plan of Treatment: Your heart enzymes were elevated and you were seen by cardiology.   given functional status you were deemed not a candidate for invasive evaluation and ischemic evaluation deferred. Follow up as outpatient with cardiologist.    Diagnosis: Hypernatremia  Assessment and Plan of Treatment: Your sodium level was elevated and resolved to normal after hydration. Please have repeat basic metabolic panel drawn within 1 week and follow up with your primary care doctor for further management.

## 2024-02-04 NOTE — PROGRESS NOTE ADULT - SUBJECTIVE AND OBJECTIVE BOX
[facility] Department of Hospital Medicine    Patient is a 79y old  Female who presents with a chief complaint of ams, fall.    : [id number]    OVERNIGHT EVENTS: No acute events overnight    SUBJECTIVE: VSS. reports overall doing well. alert and oriented today. still with LE weakness but denies fever/chills, ate good breakfast per staff at bedside. Labs this morning spurious stat labs ordered.     MEDICATIONS  (STANDING):  aspirin enteric coated 81 milliGRAM(s) Oral daily  calcium carbonate 1250 mG  + Vitamin D (OsCal 500 + D) 1 Tablet(s) Oral daily  carbidopa/levodopa  25/100 1.5 Tablet(s) Oral three times a day  carbidopa/levodopa CR 25/100 1 Tablet(s) Oral at bedtime  dextrose 5%. 1000 milliLiter(s) (50 mL/Hr) IV Continuous <Continuous>  dextrose 50% Injectable 25 Gram(s) IV Push once  dextrose 50% Injectable 12.5 Gram(s) IV Push once  enoxaparin Injectable 40 milliGRAM(s) SubCutaneous every 24 hours  fenofibrate Tablet 145 milliGRAM(s) Oral daily  ferrous    sulfate Liquid 220 milliGRAM(s) Oral daily  folic acid 1 milliGRAM(s) Oral daily  glucagon  Injectable 1 milliGRAM(s) IntraMuscular once  insulin glargine Injectable (LANTUS) 8 Unit(s) SubCutaneous at bedtime  insulin lispro (ADMELOG) corrective regimen sliding scale   SubCutaneous three times a day before meals  metoprolol succinate ER 50 milliGRAM(s) Oral daily  mirtazapine 7.5 milliGRAM(s) Oral daily  pantoprazole    Tablet 40 milliGRAM(s) Oral before breakfast  polyethylene glycol 3350 17 Gram(s) Oral daily  senna 2 Tablet(s) Oral at bedtime  silver sulfADIAZINE 1% Cream 1 Application(s) Topical two times a day  sodium chloride 0.45%. 1000 milliLiter(s) (75 mL/Hr) IV Continuous <Continuous>    MEDICATIONS  (PRN):  acetaminophen     Tablet .. 650 milliGRAM(s) Oral every 6 hours PRN Temp greater or equal to 38C (100.4F), Mild Pain (1 - 3)  dextrose Oral Gel 15 Gram(s) Oral once PRN Blood Glucose LESS THAN 70 milliGRAM(s)/deciliter      PHYSICAL EXAM:  Vital Signs Last 24 Hrs  T(C): 36.1 (04 Feb 2024 14:30), Max: 36.6 (03 Feb 2024 19:00)  T(F): 97 (04 Feb 2024 14:30), Max: 97.9 (04 Feb 2024 09:30)  HR: 78 (04 Feb 2024 14:30) (62 - 100)  BP: 115/60 (04 Feb 2024 14:30) (101/60 - 138/62)  BP(mean): --  RR: 18 (04 Feb 2024 14:30) (16 - 18)  SpO2: 100% (04 Feb 2024 14:30) (98% - 100%)    Parameters below as of 04 Feb 2024 14:30  Patient On (Oxygen Delivery Method): room air    CONSTITUTIONAL: NAD  EYE: dilated pupil, vision loss  RESPIRATORY: No increased work of breathing, CTAB, no wheezes or crackles appreciated  CARDIOVASCULAR: RRR, S1 and S2 present, no m/r/g  ABDOMEN: soft, NT, ND, bowel sounds present  EXTREMITIES: No LE edema  MUSCULOSKELETAL: no joint swelling, no tenderness to palpation  NEURO: A&Ox3, nl LE weakness    LABS:                8.2    8.06  )-----------( 105      ( 04 Feb 2024 09:56 )             23.3     02-04    124<L>  |  96<L>  |  11  ----------------------------<  99  2.5<LL>   |  19<L>  |  0.27<L>    Ca    6.1<LL>      04 Feb 2024 09:56  Phos  2.2     02-03  Mg     1.80     02-03        Urinalysis Basic - ( 04 Feb 2024 09:56 )    Color: x / Appearance: x / SG: x / pH: x  Gluc: 99 mg/dL / Ketone: x  / Bili: x / Urobili: x   Blood: x / Protein: x / Nitrite: x   Leuk Esterase: x / RBC: x / WBC x   Sq Epi: x / Non Sq Epi: x / Bacteria: x      CAPILLARY BLOOD GLUCOSE      POCT Blood Glucose.: 211 mg/dL (04 Feb 2024 12:23)      RADIOLOGY & ADDITIONAL TESTS: Reviewed.    Color: x / Appearance: x / SG: x / pH: x  Gluc: 211 mg/dL / Ketone: x  / Bili: x / Urobili: x   Blood: x / Protein: x / Nitrite: x   Leuk Esterase: x / RBC: x / WBC x   Sq Epi: x / Non Sq Epi: x / Bacteria: x      CAPILLARY BLOOD GLUCOSE      POCT Blood Glucose.: 106 mg/dL (03 Feb 2024 08:16)      RADIOLOGY & ADDITIONAL TESTS: Reviewed.    POCT Blood Glucose.: 259 mg/dL (02 Feb 2024 12:36)      RADIOLOGY & ADDITIONAL TESTS:    Results Reviewed:   Imaging Personally Reviewed:  Electrocardiogram Personally Reviewed:    COORDINATION OF CARE:  Care Discussed with Consultants/Other Providers [Y/N]:  Prior or Outpatient Records Reviewed [Y/N]:

## 2024-02-04 NOTE — DISCHARGE NOTE PROVIDER - ATTENDING DISCHARGE PHYSICAL EXAMINATION:
PHYSICAL EXAM:  Vital Signs Last 24 Hrs  T(C): 36.4 (09 Feb 2024 12:30), Max: 36.4 (09 Feb 2024 12:30)  T(F): 97.6 (09 Feb 2024 12:30), Max: 97.6 (09 Feb 2024 12:30)  HR: 91 (09 Feb 2024 12:30) (79 - 91)  BP: 104/47 (09 Feb 2024 12:30) (104/47 - 113/60)  BP(mean): --  RR: 18 (09 Feb 2024 12:30) (18 - 18)  SpO2: 100% (09 Feb 2024 12:30) (100% - 100%)    Parameters below as of 09 Feb 2024 12:30  Patient On (Oxygen Delivery Method): room air      CONSTITUTIONAL: NAD, well-groomed  EYES: PERRLA; conjunctiva and sclera clear  ENMT: Moist oral mucosa, no pharyngeal injection or exudates; normal dentition  NECK: Supple, no palpable masses; no thyromegaly  RESPIRATORY: Normal respiratory effort; lungs are clear to auscultation bilaterally  CARDIOVASCULAR: Regular rate and rhythm, normal S1 and S2, no murmur/rub/gallop; No lower extremity edema; Peripheral pulses are 2+ bilaterally  ABDOMEN: Nontender to palpation, normoactive bowel sounds, no rebound/guarding; No hepatosplenomegaly  MUSCULOSKELETAL:  no clubbing or cyanosis of digits; no joint swelling or tenderness to palpation  PSYCH: A+O to person, place, and time; affect appropriate  NEUROLOGY: CN 2-12 are intact and symmetric; no gross sensory deficits   SKIN: No rashes; no palpable lesions

## 2024-02-04 NOTE — DISCHARGE NOTE PROVIDER - NSFOLLOWUPCLINICS_GEN_ALL_ED_FT
Cardiology at [facility]  Cardiology  [address] [place] [address]  Phone: [phone]  Fax:     [facility]  Internal Medicine  [address] [place] [address]  Phone: [phone]  Fax: [phone]

## 2024-02-04 NOTE — DISCHARGE NOTE PROVIDER - HOSPITAL COURSE
80 yo F PMH IDDM, HTN, HLD, OP, Parkinson's disease, GCA (recently diagnosed) BIBEMS from [facility] s/p Fall and AMS. found to have toxic/metabolic derangement with hypernatremia and acute cystitis, and additionally with cf GCA flare with worsening vision. Evaluated by neurology, CT Head showed no acute infarct, noted with hydrocephalus of lateral and third ventricles and narrowed callosal angle suspicious for normal pressure hydrocephalus. per neuro likely mild NPH likely insignificant, recommends MR Brain which showed ______. Additionally treated with ceftriaxone for acute cystitis and IVF for hypernatremia with improvement in Mental status. Given concern for worsening vision, ophthalmology suspected possible GCA flare, started on high dose steroids. Rheumatology also saw the patient. MRI Orbit also ordered which showed ____________. Pt evaluated by Physical therapy recommends ___________ but family opted to take patient home.    78 yo F PMH IDDM, HTN, HLD, OP, Parkinson's disease, GCA (recently diagnosed) BIBEMS from [facility] s/p Fall and AMS. found to have toxic/metabolic derangement with hypernatremia and acute cystitis, and additionally with cf GCA flare with worsening vision.      Problem/Plan - 1:  ·  Problem: Fall.   ·  Plan: Patient had fall at [place] found on the floor and AMS. per daughter, hasn't been eating at the rehab and also with multiple falls recently due to vision impairment.   CTH was negative for bleed or infarct but showed hydrocephalus of lateral and third ventricles and narrowed callosal angle suspicious for normal pressure hydrocephalus.   Neuro consult recs, per neuro likely mild NPH likely insignificant, recommends MR Brain  TTE- LVEF 75%, no wall motion abn  Cont pureed diet, not cooperating with SLP eval  Maintain neuro checks  Fall precautions  Dispo: cleared by optho and rheum for DC, medically cleared for DC home with home care, daughter declined rehab, f/u CM/SW.  Outpt f/up ophtho and rheum.     Problem/Plan - 2:  ·  Problem: AMS  CTH, CTA H/N with "No acute intracranial hemorrhage, mass, or large vessel occlusion. No aneurysm. prominent lateral and third ventricles; severely diminutive veribrobasilar system with small areas of minimal filling in   the basilar artery. Prominent b/l PCOMs"  MRI/MRA brain/orbit: Unremarkable MR of the orbits. No acute infarct.   intracranial atherosclerosis cavernous and clinoid segments of the internal carotid arteries, at least mild-to-moderate on the right, more mild on the left  per neuro: c/w ASA 81mg, atorvastatin 40mg hs  optho following, will reexamine pt tomorrow   Management for electrolyte abnormalities as below and treat UTI as below  S/S eval- pt refused to participate with SLP. pt is alert but refuses to eat, discussed with patient and daughter, who wants to try pureed diet, Understands aspiration risk, wants to hold off ng tube   Neuro checks.     Problem/Plan - 3:  ·  Problem: Urinary tract infection.   ·  Plan: UA in the ED +nitrites, moderate leukocyte esterase +protein, +ketones 4k WBC, 1.2k RBC, +glucose  s/p [id number].  Ucx with E.coli pan sensitive  passed TOV.     Problem/Plan - 4:  ·  Problem: GCA (giant cell arteritis) flare with vision loss   ·  Plan: Patient with residual vision loss since last hospitalization at [facility] in January, previously treated with pulse streoids and IVIG. cf possible too fast prednisone taper at rehab, supposed to be on 40mg qd but on 20mg qd  - Evaluated by Optho- cf possible GCA flare s/p 1g solumedrol.   - Rheum consulted  -Unremarkable MR of the orbits. 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 Xin Escobar   - start mepron for pjp prophylaxis   - fu rheum recs   - currently methotrexate on hold in s.o infection   - ESR 23 and CRP 20.4  - per optho f/u on 2/6, no light perception in both eyes, pupils nonreactive 2/2 GCA flare, ocular exam otherwise wnl  - outpt f/up optho   [facility] Department of Ophthalmology  [address]  [place] [address]  [phone].     Problem/Plan - 5:  ·  Problem: Vesicovaginal fistula.   ·  Plan: CT A/P with acute emphysematous cystitis. Fluid in the vagina with marked vaginal mucosal enhancement and questionable locules of luminal air inseparable from the adjacent emphysematous bladder wall. Cannot exclude vesicovaginal fistula.  Appreciate urology eval, no acute surgical intervention per urology'  s/p UTI treatement as above. Patient is a 78 yo F PMH IDDM, HTN, HLD, OP, Parkinson's disease, GCA (recently diagnosed) BIBEMS from [facility] s/p Fall and AMS. found to have toxic/metabolic derangement with hypernatremia and acute cystitis, and additionally with cf GCA flare with worsening vision.     Fall.   Patient had fall at [place] found on the floor and AMS. per daughter, hasn't been eating at the rehab and also with multiple falls recently due to vision impairment.   CTH was negative for bleed or infarct but showed hydrocephalus of lateral and third ventricles and narrowed callosal angle suspicious for normal pressure hydrocephalus.   Neuro consult recs, per neuro likely mild NPH likely insignificant, recommends MR Brain  TTE- LVEF 75%, no wall motion abn  Cont pureed diet, not cooperating with SLP eval  Dispo: cleared by optho and rheum for DC, medically cleared for DC home with home care, daughter declined rehab, f/u CM/SW.  Outpt f/up ophtho and rheum.    AMS  CTH, CTA H/N with "No acute intracranial hemorrhage, mass, or large vessel occlusion. No aneurysm. prominent lateral and third ventricles; severely diminutive veribrobasilar system with small areas of minimal filling in   the basilar artery. Prominent b/l PCOMs"  MRI/MRA brain/orbit: Unremarkable MR of the orbits. No acute infarct.   intracranial atherosclerosis cavernous and clinoid segments of the internal carotid arteries, at least mild-to-moderate on the right, more mild on the left  per neuro: c/w ASA 81mg, atorvastatin 40mg hs  Management for electrolyte abnormalities as below and treat UTI as below  S/S eval- pt refused to participate with SLP. pt is alert but refuses to eat, discussed with patient and daughter, who wants to try pureed diet, Understands aspiration risk, wants to hold off ng tube       Urinary tract infection.   UA in the ED +nitrites, moderate leukocyte esterase +protein, +ketones 4k WBC, 1.2k RBC, +glucose  s/p ceftriaxone x5d.  Ucx with E.coli pan sensitive  passed TOV.      GCA (giant cell arteritis) flare with vision loss   Patient with residual vision loss since last hospitalization at [place] in January, previously treated with pulse steroids and IVIG.   Concern for possible too fast prednisone taper at rehab, supposed to be on 40mg qd but on 20mg qd  Patient Evaluated by Optho- cf possible GCA flare s/p 1g solumedrol.   Rheum consulted  Unremarkable MR of the orbits. MRA brain intracranial atherosclerosis cavernous and clinoid segments of the internal carotid arteries  s/p 1g solumedrol on 2/2, followed by 500mg qd on 2/3 and 2/4  continue on prednisone 1mg/kg (50mg) qd per rheum starting 2/5, d/w rheum fellow  keep her on prednisone 50 mg qd x 2 weeks then switch to prednisone 40mg qd for 1 month  outpt f/u with Dr. Xin Escobar   start mepron for pjp prophylaxis   currently methotrexate on hold in s.o infection   ESR 23 and CRP 20.4  per optho f/u on 2/6, no light perception in both eyes, pupils nonreactive 2/2 GCA flare, ocular exam otherwise wnl  outpt f/up optho   [facility] Department of Ophthalmology  [address]  [place] [address]  [phone].    Vesicovaginal fistula.   CT A/P with acute emphysematous cystitis. Fluid in the vagina with marked vaginal mucosal enhancement and questionable locules of luminal air inseparable from the adjacent emphysematous bladder wall. Cannot exclude vesicovaginal fistula.  Appreciate urology eval, no acute surgical intervention per urology'  s/p UTI treatement as above. Patient is a 78 yo F PMH IDDM, HTN, HLD, OP, Parkinson's disease, GCA (recently diagnosed) BIBEMS from [facility] s/p Fall and AMS. found to have toxic/metabolic derangement with hypernatremia and acute cystitis, and additionally with cf GCA flare with worsening vision.     Fall.    Patient had fall at [place] found on the floor and AMS. per daughter, hasn't been eating at the rehab and also with multiple falls recently due to vision impairment.   CTH was negative for bleed or infarct but showed hydrocephalus of lateral and third ventricles and narrowed callosal angle suspicious for normal pressure hydrocephalus.   Neuro consult recs, per neuro likely mild NPH likely insignificant, recommends MR Brain  TTE- LVEF 75%, no wall motion abn  Cont pureed diet, not cooperating with SLP eval  Dispo: DC plan home with home care 2/9. Cleared by optho and rheum for DC, medically cleared for DC home with home care, daughter declined rehab.  Outpt f/up ophtho and rheum. Will get delivery of wheelchair and hospital bed to home.     Altered mental status.   Patient is A&Ox0 on admission, currently improved to ANO2-3. AMS likely 2/2 toxic metabolic derangements as below, but per neuro less likely neurovascular etiology. Also with known parkinson's disease for the past 8 months.  Per daughter at baseline patient is A&Ox3 and conversant but has been deteriorating since mid-jan.   CTH, CTA H/N with "No acute intracranial hemorrhage, mass, or large vessel occlusion. No aneurysm. prominent lateral and third ventricles; severely diminutive veribrobasilar system with small areas of minimal filling in the basilar artery. Prominent b/l PCOMs"  MRI/MRA brain/orbit: Unremarkable MR of the orbits. No acute infarct.   intracranial atherosclerosis cavernous and clinoid segments of the internal carotid arteries, at least mild-to-moderate on the right, more mild on the left  per neuro: c/w ASA 81mg, atorvastatin 40mg hs  Management for electrolyte abnormalities as below and treat UTI as below  S/S eval- pt refused to participate with SLP. pt is alert but refuses to eat, discussed with patient and daughter, who wants to try pureed diet, Understands aspiration risk  Outpatient follow up with neurology     Urinary tract infection.   UA in the ED +nitrites, moderate leukocyte esterase +protein, +ketones 4k WBC, 1.2k RBC, +glucose  s/p [id number].  Ucx with E.coli pan sensitive  passed TOV.  Outpatient follow up with urology     GCA (giant cell arteritis).   - Patient with residual vision loss since last hospitalization at [place] in January, previously treated with pulse streoids and IVIG. cf possible too fast prednisone taper at rehab, supposed to be on 40mg qd but on 20mg qd  - Evaluated by Optho- cf possible GCA flare s/p 1g solumedrol.   - Rheum consulted  -Unremarkable MR of the orbits. 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 Xin Escobar rheumatology  - started mepron for pjp prophylaxis , continue PPI   - currently methotrexate on hold in s.o infection - will hold on discharge   - ESR 23 and CRP 20.4  - per optho f/u on 2/6, no light perception in both eyes, pupils nonreactive 2/2 GCA flare, ocular exam otherwise wnl  - outpt f/up optho   [facility] Department of Ophthalmology  [address]  [place] [address]  [phone].    Vesicovaginal fistula.   - CT A/P with acute emphysematous cystitis. Fluid in the vagina with marked vaginal mucosal enhancement and questionable locules of luminal air inseparable from the adjacent emphysematous bladder wall. Cannot exclude vesicovaginal fistula.  Appreciate urology eval, no acute surgical intervention per urology'  s/p UTI treatement as above.  - outpatient follow up with urology     Hypernatremia.   resolved   Likely 2/2 decreased PO intake and severe dehydration.   outpt follow up with PMD     Elevated troponin.   Troponin 70->54  EKG NSR TWI V3-V6, I, II, aVF c/f anterolateral and inferior ischemia  TTE LVEF >75%, no wall motion abn  Cardiology consulted: mild troponin elevation, unlikely ACS and more likely due to underlying medical issues  given functional status not a candidate for invasive evaluation and would defer ischemic eval .    Anemia.    Hgb 11.7 - stable    iron, ferritin, TIBC-doesn't suggest ANTHONY, FOLATE/B12 WNL  Monitor CBC, maintain active type and screen.  outpatient follow up     Diabetes.   IDDM2  Home regimen: glargine 15u qHS, admelog 7u TID qAC  Cont lantus 8 qhs and iss  Monitor FSG, maintain FITZ, hypoglycemia protocol.    History of Parkinson's disease.   Continue home sinemet.    Hypertension.   Continue home toprol XL    On 2/9/24 this case was reviewed with Dr. Roy, the patient is medically stable and optimized for discharge. All medications were reviewed and prescriptions were sent to mutually agreed upon pharmacy.        Patient is a 78 yo F PMH IDDM, HTN, HLD, OP, Parkinson's disease, GCA (recently diagnosed) BIBEMS from [facility] s/p Fall and AMS. found to have toxic/metabolic derangement with hypernatremia and acute cystitis, and additionally with cf GCA flare with worsening vision.     Fall.    Patient had fall at [place] found on the floor and AMS. per daughter, hasn't been eating at the rehab and also with multiple falls recently due to vision impairment.   CTH was negative for bleed or infarct but showed hydrocephalus of lateral and third ventricles and narrowed callosal angle suspicious for normal pressure hydrocephalus.   Neuro consult recs, per neuro likely mild NPH likely insignificant, recommends MR Brain  TTE- LVEF 75%, no wall motion abn  Cont pureed diet, not cooperating with SLP eval  Dispo: DC plan home with home care 2/9. Cleared by optho and rheum for DC, medically cleared for DC home with home care, daughter declined rehab.  Outpt f/up ophtho and rheum. Will get delivery of wheelchair and hospital bed to home.     Altered mental status.   Patient is A&Ox0 on admission, currently improved to ANO2-3. AMS likely 2/2 toxic metabolic derangements as below, but per neuro less likely neurovascular etiology. Also with known parkinson's disease for the past 8 months.  Per daughter at baseline patient is A&Ox3 and conversant but has been deteriorating since mid-jan.   CTH, CTA H/N with "No acute intracranial hemorrhage, mass, or large vessel occlusion. No aneurysm. prominent lateral and third ventricles; severely diminutive veribrobasilar system with small areas of minimal filling in the basilar artery. Prominent b/l PCOMs"  MRI/MRA brain/orbit: Unremarkable MR of the orbits. No acute infarct.   intracranial atherosclerosis cavernous and clinoid segments of the internal carotid arteries, at least mild-to-moderate on the right, more mild on the left  per neuro: c/w ASA 81mg, atorvastatin 40mg hs  Management for electrolyte abnormalities as below and treat UTI as below  S/S eval- pt refused to participate with SLP. pt is alert but refuses to eat, discussed with patient and daughter, who wants to try pureed diet, Understands aspiration risk  Outpatient follow up with neurology     Urinary tract infection.   UA in the ED +nitrites, moderate leukocyte esterase +protein, +ketones 4k WBC, 1.2k RBC, +glucose  s/p [id number].  Ucx with E.coli pan sensitive  passed TOV.  Outpatient follow up with urology     GCA (giant cell arteritis).   - Patient with residual vision loss since last hospitalization at [place] in January, previously treated with pulse streoids and IVIG. cf possible too fast prednisone taper at rehab, supposed to be on 40mg qd but on 20mg qd  - Evaluated by Optho- cf possible GCA flare s/p 1g solumedrol.   - Rheum consulted  -Unremarkable MR of the orbits. 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 Xin Escobar rheumatology  - started mepron for pjp prophylaxis , continue PPI   - currently methotrexate on hold in s.o infection - will hold on discharge   - ESR 23 and CRP 20.4  - per optho f/u on 2/6, no light perception in both eyes, pupils nonreactive 2/2 GCA flare, ocular exam otherwise wnl  - outpt f/up optho   [facility] Department of Ophthalmology  [address]  [place] [address]  [phone].    Vesicovaginal fistula.   - CT A/P with acute emphysematous cystitis. Fluid in the vagina with marked vaginal mucosal enhancement and questionable locules of luminal air inseparable from the adjacent emphysematous bladder wall. Cannot exclude vesicovaginal fistula.  Appreciate urology eval, no acute surgical intervention per urology'  s/p UTI treatement as above.  - outpatient follow up with urology     Hypernatremia.   resolved   Likely 2/2 decreased PO intake and severe dehydration.   outpt follow up with PMD     Elevated troponin.   Troponin 70->54  EKG NSR TWI V3-V6, I, II, aVF c/f anterolateral and inferior ischemia  TTE LVEF >75%, no wall motion abn  Cardiology consulted: mild troponin elevation, unlikely ACS and more likely due to underlying medical issues  given functional status not a candidate for invasive evaluation and would defer ischemic eval .    Anemia.    Hgb 11.7 - stable    iron, ferritin, TIBC-doesn't suggest ANTHONY, FOLATE/B12 WNL  Monitor CBC, maintain active type and screen.  outpatient follow up     Diabetes.   IDDM2  Home regimen: glargine 15u qHS, admelog 7u TID qAC  Cont lantus 8 qhs and iss  Monitor FSG, maintain FITZ, hypoglycemia protocol.    History of Parkinson's disease.   Continue home sinemet.    Hypertension.   Continue home toprol XL    On 2/9/24 this case was reviewed with Dr. Galvez, the patient is medically stable and optimized for discharge. All medications were reviewed and prescriptions were sent to mutually agreed upon pharmacy.        Patient is a 80 yo F PMH IDDM, HTN, HLD, OP, Parkinson's disease, GCA (recently diagnosed) BIBEMS from [facility] s/p Fall and AMS. found to have toxic/metabolic derangement with hypernatremia and acute cystitis, and additionally with cf GCA flare with worsening vision.      Problem/Plan - 1:  ·  Problem: Fall.   ·  Plan: Patient had fall at [place] found on the floor and AMS. per daughter, hasn't been eating at the rehab and also with multiple falls recently due to vision impairment.   CTH was negative for bleed or infarct but showed hydrocephalus of lateral and third ventricles and narrowed callosal angle suspicious for normal pressure hydrocephalus.   Neuro consult recs, per neuro likely mild NPH likely insignificant, recommends MR Brain  TTE- LVEF 75%, no wall motion abn  pt is not eating or taking meds, d/w daughter, pt wouldn't eat or take meds until she gets regular diet, will try regular diet, please help patient with feeding as she's legally blind, aspiration precaution, daughter denies hx of aspiration or choking on food  Fall and aspiration precaution  Dispo: DC home once she's eating and taking her meds consistently , updated daughter on 2/11  Medically cleared by rheum and ophtho for DC, but pt refusing meds.  DC home with home care, daughter declined rehab, f/u CM/SW.  Outpt f/up ophtho and rheum. Per CM, wheelchair and hospital bed have been delivered. Family confirmed delivery.     Problem/Plan - 2:  ·  Problem: Altered mental status.   ·  Plan: Patient is A&Ox0 on admission, currently improved to ANO2-3. AMS likely 2/2 toxic metabolic derangements as below, but per neuro less likely neurovascular etiology. Also with known parkinson's disease for the past 8 months.  Per daughter at baseline patient is A&Ox3 and conversant but has been deteriorating since mid-jan.     CTH, CTA H/N with "No acute intracranial hemorrhage, mass, or large vessel occlusion. No aneurysm. prominent lateral and third ventricles; severely diminutive vertebrobasilar system with small areas of minimal filling in the basilar artery. Prominent b/l PCOMs"  MRI/MRA brain/orbit: Unremarkable MR of the orbits. No acute infarct.   intracranial atherosclerosis cavernous and clinoid segments of the internal carotid arteries, at least mild-to-moderate on the right, more mild on the left  per neuro: c/w ASA 81mg, atorvastatin 40mg hs  optho following, will reexamine pt tomorrow   Management for electrolyte abnormalities as below and treat UTI as below  S/S eval- pt refused to participate with SLP. pt is alert but refuses to eat, discussed with patient and daughter, who wants to try pureed diet, Understands aspiration risk, wants to hold off ng tube   Neuro checks    Outpt [facility] follow up.     Problem/Plan - 3:  ·  Problem: Urinary tract infection.   ·  Plan: UA in the ED +nitrites, moderate leukocyte esterase +protein, +ketones 4k WBC, 1.2k RBC, +glucose  s/p [id number].  Ucx with E.coli pan sensitive  passed TOV.     Problem/Plan - 4:  ·  Problem: GCA (giant cell arteritis).   ·  Plan: Patient with residual vision loss since last hospitalization at [place] in January, previously treated with pulse streoids and IVIG. cf possible too fast prednisone taper at rehab, supposed to be on 40mg qd but on 20mg qd  - Evaluated by Optho- cf possible GCA flare s/p 1g solumedrol.   - Rheum consulted  -Unremarkable MR of the orbits. 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 Pt has been refusing prednisone and also refusing IV.   - assuming she's taking steroid, cont prednisone 50 mg until 2/20, then switch to prednisone 40mg qd   - outpt f/u with Dr. Xin Escobar   - start mepron for pjp prophylaxis   - fu rheum recs   - currently methotrexate on hold in s.o infection   - ESR 23 and CRP 20.4  - per optho f/u on 2/6, no light perception in both eyes, pupils nonreactive 2/2 GCA flare, ocular exam otherwise wnl  - outpt f/up optho   [facility] Department of Ophthalmology  [address]  [place], [place] [address] [phone].     Problem/Plan - 5:  ·  Problem: Vesicovaginal fistula.   ·  Plan: CT A/P with acute emphysematous cystitis. Fluid in the vagina with marked vaginal mucosal enhancement and questionable locules of luminal air inseparable from the adjacent emphysematous bladder wall. Cannot exclude vesicovaginal fistula.  Appreciate urology eval, no acute surgical intervention per urology'  s/p UTI treatement as above.       Patient is a 78 yo F PMH IDDM, HTN, HLD, OP, Parkinson's disease, GCA (recently diagnosed) BIBEMS from [facility] s/p Fall and AMS. found to have toxic/metabolic derangement with hypernatremia and acute cystitis, and additionally with cf GCA flare with worsening vision.     # Problem: Fall.   ·  Plan: Patient had fall at [place] found on the floor and AMS. per daughter, hasn't been eating at the rehab and also with multiple falls recently due to vision impairment.   CTH was negative for bleed or infarct but showed hydrocephalus of lateral and third ventricles and narrowed callosal angle suspicious for normal pressure hydrocephalus.   Neuro consult recs, per neuro likely mild NPH likely insignificant, MRI brain notes: Unremarkable MR of the orbits. No acute infarct.   intracranial atherosclerosis cavernous and clinoid segments of the internal carotid arteries, at least mild-to-moderate on the right, more mild on the left  per neuro will be discharged on c/w ASA 81mg, atorvastatin 40mg hs and f/u outpatient  TTE- LVEF 75%, no wall motion abn    # Problem: Altered mental status.   ·  Plan: Patient is A&Ox0 on admission, currently improved to ANO2-3. AMS likely 2/2 toxic metabolic derangements as below, but per neuro less likely neurovascular etiology. Also with known parkinson's disease for the past 8 months.  Per daughter at baseline patient is A&Ox3 and conversant but has been deteriorating since mid-jan.     CTH, CTA H/N with "No acute intracranial hemorrhage, mass, or large vessel occlusion. No aneurysm. prominent lateral and third ventricles; severely diminutive vertebrobasilar system with small areas of minimal filling in the basilar artery. Prominent b/l PCOMs"  MRI/MRA brain/orbit: Unremarkable MR of the orbits. No acute infarct.   intracranial atherosclerosis cavernous and clinoid segments of the internal carotid arteries, at least mild-to-moderate on the right, more mild on the left  per neuro: c/w ASA 81mg, atorvastatin 40mg hs  Outpt [facility] psych follow up.     Problem/Plan - 3:  ·  Problem: Urinary tract infection 2/2 e.coli and kleb  s/p [id number].       Problem/Plan - 4:  ·  Problem: GCA (giant cell arteritis).   ·  Plan: Patient with residual vision loss since last hospitalization at [facility] in January, previously treated with pulse streoids and IVIG. cf possible too fast prednisone taper at rehab, supposed to be on 40mg qd but on 20mg qd  - Evaluated by Optho and rheum- cf possible GCA flare s/p 1g solumedrol.   -Unremarkable MR of the orbits. 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 Xin Escobar   -mepron started for pjp prophylaxis   - currently methotrexate on hold in s.o infection   - ESR 23 and CRP 20.4  - per optho f/u on 2/6, no light perception in both eyes, pupils nonreactive 2/2 GCA flare, ocular exam otherwise wnl  - outpt f/up optho   [facility] Department of Ophthalmology  [address]  [place] [address]  [phone].     Problem/Plan - 5:  ·  Problem: Vesicovaginal fistula.   ·  Plan: CT A/P with acute emphysematous cystitis. Fluid in the vagina with marked vaginal mucosal enhancement and questionable locules of luminal air inseparable from the adjacent emphysematous bladder wall. Cannot exclude vesicovaginal fistula.  Appreciate urology eval, no acute surgical intervention per urology'  s/p UTI treatement as above.       Patient is a 78 yo F PMH IDDM, HTN, HLD, OP, Parkinson's disease, GCA (recently diagnosed) BIBEMS from [facility] s/p Fall and AMS. found to have toxic/metabolic derangement with hypernatremia and acute cystitis, and additionally with cf GCA flare with worsening vision.     # Problem: Fall.   ·  Plan: Patient had fall at [facility] found on the floor and AMS. per daughter, hasn't been eating at the rehab and also with multiple falls recently due to vision impairment.   CTH was negative for bleed or infarct but showed hydrocephalus of lateral and third ventricles and narrowed callosal angle suspicious for normal pressure hydrocephalus.   Neuro consult recs, per neuro likely mild NPH likely insignificant, MRI brain notes: Unremarkable MR of the orbits. No acute infarct.   intracranial atherosclerosis cavernous and clinoid segments of the internal carotid arteries, at least mild-to-moderate on the right, more mild on the left  per neuro will be discharged on c/w ASA 81mg, atorvastatin 40mg hs and f/u outpatient  TTE- LVEF 75%, no wall motion abn    # Problem: Altered mental status.   ·  Plan: Patient is A&Ox0 on admission, currently improved to ANO2-3. AMS likely 2/2 toxic metabolic derangements as below, but per neuro less likely neurovascular etiology. Also with known parkinson's disease for the past 8 months.  Per daughter at baseline patient is A&Ox3 and conversant but has been deteriorating since mid-jan.     CTH, CTA H/N with "No acute intracranial hemorrhage, mass, or large vessel occlusion. No aneurysm. prominent lateral and third ventricles; severely diminutive vertebrobasilar system with small areas of minimal filling in the basilar artery. Prominent b/l PCOMs"  MRI/MRA brain/orbit: Unremarkable MR of the orbits. No acute infarct.   intracranial atherosclerosis cavernous and clinoid segments of the internal carotid arteries, at least mild-to-moderate on the right, more mild on the left  per neuro: c/w ASA 81mg, atorvastatin 40mg hs  Outpt [facility] psych follow up.     Problem/Plan - 3:  ·  Problem: Urinary tract infection 2/2 e.coli and kleb  s/p [id number].       Problem/Plan - 4:  ·  Problem: GCA (giant cell arteritis).   ·  Plan: Patient with residual vision loss since last hospitalization at [facility] in January, previously treated with pulse streoids and IVIG. cf possible too fast prednisone taper at rehab, supposed to be on 40mg qd but on 20mg qd  - Evaluated by Optho and rheum- cf possible GCA flare s/p 1g solumedrol.   -Unremarkable MR of the orbits. 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 Xin Escobar   -mepron started for pjp prophylaxis   - currently methotrexate on hold in s.o infection   - ESR 23 and CRP 20.4  - per optho f/u on 2/6, no light perception in both eyes, pupils nonreactive 2/2 GCA flare, ocular exam otherwise wnl  - outpt f/up optho   [facility] Department of Ophthalmology  [address]  [place] [address]  [phone].     Problem/Plan - 5:  ·  Problem: Vesicovaginal fistula.   ·  Plan: CT A/P with acute emphysematous cystitis. Fluid in the vagina with marked vaginal mucosal enhancement and questionable locules of luminal air inseparable from the adjacent emphysematous bladder wall. Cannot exclude vesicovaginal fistula.  Appreciate urology eval, no acute surgical intervention per urology'  s/p UTI treatement as above.    exam from 2/14  GEN: Appears in no acute distress  HEENT: tracking neck supple, no lymphadenopathy, no JVD  MOUTH: moist mucous membranes, no exudates or lesions   PULM: Clear to auscultation bilaterally, no wheezes, rales, rhonchi  CV: RRR, S1S2, no murmurs, rubs or gallops  Abdominal: Soft, nontender to palpation, non-distended, normoactive bowel sounds  Extremities: WWP, No edema,   NEURO: AAOx3, moving all four extremities spontaneously, increased tone  Skin: No rashes, lesions, hematomas, ecchymosis    >33 min have been spent in the care and coordination of this patient's care

## 2024-02-05 LAB
ANION GAP SERPL CALC-SCNC: 10 MMOL/L — SIGNIFICANT CHANGE UP (ref 7–14)
B PERT DNA SPEC QL NAA+PROBE: SIGNIFICANT CHANGE UP
B PERT+PARAPERT DNA PNL SPEC NAA+PROBE: SIGNIFICANT CHANGE UP
BORDETELLA PARAPERTUSSIS (RAPRVP): SIGNIFICANT CHANGE UP
BUN SERPL-MCNC: 20 MG/DL — SIGNIFICANT CHANGE UP (ref 7–23)
C PNEUM DNA SPEC QL NAA+PROBE: SIGNIFICANT CHANGE UP
CALCIUM SERPL-MCNC: 8.4 MG/DL — SIGNIFICANT CHANGE UP (ref 8.4–10.5)
CHLORIDE SERPL-SCNC: 106 MMOL/L — SIGNIFICANT CHANGE UP (ref 98–107)
CO2 SERPL-SCNC: 24 MMOL/L — SIGNIFICANT CHANGE UP (ref 22–31)
CREAT SERPL-MCNC: 0.41 MG/DL — LOW (ref 0.5–1.3)
EGFR: 99 ML/MIN/1.73M2 — SIGNIFICANT CHANGE UP
FLUAV SUBTYP SPEC NAA+PROBE: SIGNIFICANT CHANGE UP
FLUBV RNA SPEC QL NAA+PROBE: SIGNIFICANT CHANGE UP
GLUCOSE BLDC GLUCOMTR-MCNC: 114 MG/DL — HIGH (ref 70–99)
GLUCOSE BLDC GLUCOMTR-MCNC: 91 MG/DL — SIGNIFICANT CHANGE UP (ref 70–99)
GLUCOSE SERPL-MCNC: 81 MG/DL — SIGNIFICANT CHANGE UP (ref 70–99)
HADV DNA SPEC QL NAA+PROBE: SIGNIFICANT CHANGE UP
HCOV 229E RNA SPEC QL NAA+PROBE: SIGNIFICANT CHANGE UP
HCOV HKU1 RNA SPEC QL NAA+PROBE: SIGNIFICANT CHANGE UP
HCOV NL63 RNA SPEC QL NAA+PROBE: SIGNIFICANT CHANGE UP
HCOV OC43 RNA SPEC QL NAA+PROBE: SIGNIFICANT CHANGE UP
HCT VFR BLD CALC: 29.5 % — LOW (ref 34.5–45)
HGB BLD-MCNC: 9.9 G/DL — LOW (ref 11.5–15.5)
HMPV RNA SPEC QL NAA+PROBE: SIGNIFICANT CHANGE UP
HPIV1 RNA SPEC QL NAA+PROBE: SIGNIFICANT CHANGE UP
HPIV2 RNA SPEC QL NAA+PROBE: SIGNIFICANT CHANGE UP
HPIV3 RNA SPEC QL NAA+PROBE: SIGNIFICANT CHANGE UP
HPIV4 RNA SPEC QL NAA+PROBE: SIGNIFICANT CHANGE UP
M PNEUMO DNA SPEC QL NAA+PROBE: SIGNIFICANT CHANGE UP
MAGNESIUM SERPL-MCNC: 1.8 MG/DL — SIGNIFICANT CHANGE UP (ref 1.6–2.6)
MCHC RBC-ENTMCNC: 28 PG — SIGNIFICANT CHANGE UP (ref 27–34)
MCHC RBC-ENTMCNC: 33.6 GM/DL — SIGNIFICANT CHANGE UP (ref 32–36)
MCV RBC AUTO: 83.3 FL — SIGNIFICANT CHANGE UP (ref 80–100)
NRBC # BLD: 0 /100 WBCS — SIGNIFICANT CHANGE UP (ref 0–0)
NRBC # FLD: 0 K/UL — SIGNIFICANT CHANGE UP (ref 0–0)
PHOSPHATE SERPL-MCNC: 1.7 MG/DL — LOW (ref 2.5–4.5)
PLATELET # BLD AUTO: 164 K/UL — SIGNIFICANT CHANGE UP (ref 150–400)
POTASSIUM SERPL-MCNC: 3.8 MMOL/L — SIGNIFICANT CHANGE UP (ref 3.5–5.3)
POTASSIUM SERPL-SCNC: 3.8 MMOL/L — SIGNIFICANT CHANGE UP (ref 3.5–5.3)
RAPID RVP RESULT: SIGNIFICANT CHANGE UP
RBC # BLD: 3.54 M/UL — LOW (ref 3.8–5.2)
RBC # FLD: 21.9 % — HIGH (ref 10.3–14.5)
RSV RNA SPEC QL NAA+PROBE: SIGNIFICANT CHANGE UP
RV+EV RNA SPEC QL NAA+PROBE: SIGNIFICANT CHANGE UP
SARS-COV-2 RNA SPEC QL NAA+PROBE: SIGNIFICANT CHANGE UP
SODIUM SERPL-SCNC: 140 MMOL/L — SIGNIFICANT CHANGE UP (ref 135–145)
WBC # BLD: 11.74 K/UL — HIGH (ref 3.8–10.5)
WBC # FLD AUTO: 11.74 K/UL — HIGH (ref 3.8–10.5)

## 2024-02-05 PROCEDURE — 70543 MRI ORBT/FAC/NCK W/O &W/DYE: CPT | Mod: 26

## 2024-02-05 PROCEDURE — 99233 SBSQ HOSP IP/OBS HIGH 50: CPT

## 2024-02-05 PROCEDURE — 70544 MR ANGIOGRAPHY HEAD W/O DYE: CPT | Mod: 26,XU

## 2024-02-05 PROCEDURE — 70553 MRI BRAIN STEM W/O & W/DYE: CPT | Mod: 26

## 2024-02-05 PROCEDURE — 99233 SBSQ HOSP IP/OBS HIGH 50: CPT | Mod: GC

## 2024-02-05 RX ORDER — POTASSIUM PHOSPHATE, MONOBASIC POTASSIUM PHOSPHATE, DIBASIC 236; 224 MG/ML; MG/ML
30 INJECTION, SOLUTION INTRAVENOUS ONCE
Refills: 0 | Status: COMPLETED | OUTPATIENT
Start: 2024-02-05 | End: 2024-02-05

## 2024-02-05 RX ORDER — ATOVAQUONE 750 MG/5ML
1500 SUSPENSION ORAL DAILY
Refills: 0 | Status: DISCONTINUED | OUTPATIENT
Start: 2024-02-05 | End: 2024-02-14

## 2024-02-05 RX ADMIN — MIRTAZAPINE 7.5 MILLIGRAM(S): 45 TABLET, ORALLY DISINTEGRATING ORAL at 11:20

## 2024-02-05 RX ADMIN — CARBIDOPA AND LEVODOPA 1.5 TABLET(S): 25; 100 TABLET ORAL at 05:20

## 2024-02-05 RX ADMIN — Medication 40 MILLIEQUIVALENT(S): at 05:19

## 2024-02-05 RX ADMIN — Medication 1 TABLET(S): at 11:21

## 2024-02-05 RX ADMIN — Medication 50 MILLIGRAM(S): at 05:20

## 2024-02-05 RX ADMIN — Medication 1 APPLICATION(S): at 18:01

## 2024-02-05 RX ADMIN — Medication 1 MILLIGRAM(S): at 11:21

## 2024-02-05 RX ADMIN — Medication 145 MILLIGRAM(S): at 11:20

## 2024-02-05 RX ADMIN — POTASSIUM PHOSPHATE, MONOBASIC POTASSIUM PHOSPHATE, DIBASIC 83.33 MILLIMOLE(S): 236; 224 INJECTION, SOLUTION INTRAVENOUS at 12:29

## 2024-02-05 RX ADMIN — Medication 220 MILLIGRAM(S): at 11:21

## 2024-02-05 RX ADMIN — Medication 1 APPLICATION(S): at 05:23

## 2024-02-05 RX ADMIN — POLYETHYLENE GLYCOL 3350 17 GRAM(S): 17 POWDER, FOR SOLUTION ORAL at 11:22

## 2024-02-05 RX ADMIN — Medication 81 MILLIGRAM(S): at 11:21

## 2024-02-05 NOTE — PROGRESS NOTE ADULT - ASSESSMENT
78 yo F PMH IDDM, HTN, HLD, OP, Parkinson's disease, GCA (recently diagnosed) BIBEMS from [facility] s/p Fall and AMS. found to have toxic/metabolic derangement with hypernatremia and acute cystitis, and additionally with cf GCA flare with worsening vision.

## 2024-02-05 NOTE — SWALLOW BEDSIDE ASSESSMENT ADULT - NS SPL SWALLOW CLINIC TRIAL FT
unable to adequately assess given limited acceptance of PO trials, patient declining additional trials stating "I want to lay down" no additional trials given
Unable to adequately assess oral and pharyngeal phases of the swallow given limited PO trials

## 2024-02-05 NOTE — PROGRESS NOTE ADULT - ASSESSMENT
80 yo F PMH IDDM, HTN, HLD, OP, Parkinson's, GCA RAMON from [facility] who presents for AMS, found to have UTI. As well, complaining of bitemporal vision loss c/f GCA flare. Rheumatology consulted for GCA flare management.     #Hx of GCA  #AMS   #Bitemporal vision loss  -Dx in Dec 2023 at [facility] after a fall and acute bitemporal vision loss  -ophtho exam with optic disc edema. Elevated ESR 88 and CRP 77.8   -S/p R temporal artery that per discharge summary, demonstrated evidence of GCA  -S/p 1g solumedrol 3 day pulse, IVIG and initiated on MTX 15mg. Taper from discharge summary for prednisone: Pred 50mg until Jan 12th, switch to Pred 40mg for additional 2 weeks until follow up.   -Was on Pred 20mg daily at [facility] and did not follow up with rheumatology or ophtho   -Evaluated by ophtho here, noted to have concern for worsening vision based on exam in house compared to OSH documentation. As well, concerned that AMS could be 2/2 systemic GCA.   -S/p pulse dose steroids 2/1-2/3, still with poor vision   -However, pt also with emphysematous cystitis and has been on abx, which could also have contributed to mental status changes  -Repeat ESR and CRP inpt decreased from when pt was dx- ESR 23, CRP 20   -As well, has repeat imaging here that demonstrates ventriculomegaly. Neuro evaluated, have lower suspicion as cause for current symptoms. MRI noted as above     Recommendations:  -Advise slow prednisone taper: c/w prednisone 50 mg daily for 2 weeks, then transition to 40 mg daily x1 month (with further taper instructions to be given outpatient)  -Overall, have lower suspicion for systemic GCA as pt has hx of recurrent UTIs that cause AMS and pt has also been improving while on abx. Pt also with improved inflammatory markers compared to when she was dx at OSH  -C/w GI PPX, advised starting mepron as well   -Pt can f/u outpatient with Dr. Escobar at [address] on discharge   -No rheumatologic contraindication to discharge    Discussed with daughters  Discussed with hospitalist   Discussed with Dr. Shawn Harris MD   Rheumatology Fellow  Available on TEAMS   80 yo F PMH IDDM, HTN, HLD, OP, Parkinson's, GCA RAMON from [facility] who presents for AMS, found to have UTI. As well, complaining of bitemporal vision loss c/f GCA flare. Rheumatology consulted for GCA flare management.     #Hx of GCA  #AMS   #Bitemporal vision loss  -Dx in Dec 2023 at [facility] after a fall and acute bitemporal vision loss  -ophtho exam with optic disc edema. Elevated ESR 88 and CRP 77.8   -S/p R temporal artery biopsy that per discharge summary, demonstrated evidence of GCA  -S/p 1g solumedrol 3 day pulse, IVIG and initiated on MTX 15mg. Taper from discharge summary for prednisone: Pred 50mg until Jan 12th, switch to Pred 40mg for additional 2 weeks until follow up.   -Was on Pred 20mg daily at [facility] and did not follow up with rheumatology or ophtho   -Evaluated by ophtho here, noted to have concern for worsening vision based on exam in house compared to OSH documentation. As well, concerned that AMS could be 2/2 systemic GCA.   -S/p pulse dose steroids 2/1-2/3, still with poor vision   -However, pt also with emphysematous cystitis and has been on abx, which could also have contributed to mental status changes  -Repeat ESR and CRP inpt decreased from when pt was dx- ESR 23, CRP 20   -As well, has repeat imaging here that demonstrates ventriculomegaly. Neuro evaluated, have lower suspicion as cause for current symptoms. MRI noted as above     Recommendations:  -Advise slow prednisone taper: c/w prednisone 50 mg daily for 2 weeks, then transition to 40 mg daily x1 month (with further taper instructions to be given outpatient)  -Overall, have lower suspicion for systemic GCA as pt has hx of recurrent UTIs that cause AMS and pt has also been improving while on abx. Pt also with improved inflammatory markers compared to when she was dx at OSH  -C/w GI PPX, advised starting mepron as well   -Pt can f/u outpatient with Dr. Escboar at [address] on discharge   -No rheumatologic contraindication to discharge    Discussed with daughters  Discussed with hospitalist   Discussed with Dr. Shawn Harris MD   Rheumatology Fellow  Available on TEAMS   78 yo F PMH IDDM, HTN, HLD, OP, Parkinson's, GCA RAMON from [facility] who presents for AMS, found to have UTI. As well, complaining of bitemporal vision loss c/f GCA flare. Rheumatology consulted for GCA flare management.     #Hx of GCA  #AMS   #Bitemporal vision loss  -Dx in Dec 2023 at [facility] after a fall and acute bitemporal vision loss  -ophtho exam with optic disc edema. Elevated ESR 88 and CRP 77.8   -S/p R temporal artery biopsy that per discharge summary, demonstrated evidence of GCA  -S/p 1g solumedrol 3 day pulse, IVIG and initiated on MTX 15mg. Taper from discharge summary for prednisone: Pred 50mg until Jan 12th, switch to Pred 40mg for additional 2 weeks until follow up.   -Was on Pred 20mg daily at [facility] and did not follow up with rheumatology or ophtho   -Evaluated by ophtho here, noted to have concern for worsening vision based on exam in house compared to OSH documentation. As well, concerned that AMS could be 2/2 systemic GCA.   -S/p pulse dose steroids 2/1-2/3, still with poor vision   -However, pt also with emphysematous cystitis and has been on abx, which could also have contributed to mental status changes  -Repeat ESR and CRP inpt decreased from when pt was dx- ESR 23, CRP 20   -As well, has repeat imaging here that demonstrates ventriculomegaly. Neuro evaluated, have lower suspicion as cause for current symptoms. MRI noted as above     Recommendations:  -Advise slow prednisone taper: c/w prednisone 50 mg daily for 2 weeks, then transition to 40 mg daily x1 month (with further taper instructions to be given outpatient)  -C/w GI PPX, advised starting mepron as well   -Pt can f/u outpatient with Dr. Escobar at [address] on discharge   -No rheumatologic contraindication to discharge    Discussed with daughters  Discussed with hospitalist   Discussed with Dr. Shawn Harris MD   Rheumatology Fellow  Available on TEAMS

## 2024-02-05 NOTE — PROGRESS NOTE ADULT - PROBLEM SELECTOR PLAN 2
Patient is A&Ox0 on admission, currently improved to ANO2-3. AMS likely 2/2 toxic metabolic derangements as below, but per neuro less likely neurovascular etiology. Also with known parkinson's disease for the past 8 months.  Per daughter at baseline patient is A&Ox3 and conversant but has been deteriorating since mid-jan.     CTH, CTA H/N with "No acute intracranial hemorrhage, mass, or large vessel occlusion. No aneurysm. prominent lateral and third ventricles; severely diminutive veribrobasilar system with small areas of minimal filling in   the basilar artery. Prominent b/l PCOMs"  MRI/MRA brain/orbit: Unremarkable MR of the orbits.  intracranial atherosclerosis cavernous and clinoid segments of the internal carotid arteries, at least mild-to-moderate on the right, more mild on the left  per neuro: c/w ASA 81mg, atorvastatin 40mg hs  optho following, will reexamine pt tomorrow   Management for electrolyte abnormalities as below and treat UTI as below  S/S eval- pt refused to participate with SLP. pt is alert but refuses to eat, discussed with patient and daughter, who wants to try pureed diet, Understands aspiration risk, wants to hold off ng tube   Neuro checks Patient is A&Ox0 on admission, currently improved to ANO2-3. AMS likely 2/2 toxic metabolic derangements as below, but per neuro less likely neurovascular etiology. Also with known parkinson's disease for the past 8 months.  Per daughter at baseline patient is A&Ox3 and conversant but has been deteriorating since mid-jan.     CTH, CTA H/N with "No acute intracranial hemorrhage, mass, or large vessel occlusion. No aneurysm. prominent lateral and third ventricles; severely diminutive veribrobasilar system with small areas of minimal filling in   the basilar artery. Prominent b/l PCOMs"  MRI/MRA brain/orbit: Unremarkable MR of the orbits. No acute infarct.   intracranial atherosclerosis cavernous and clinoid segments of the internal carotid arteries, at least mild-to-moderate on the right, more mild on the left  per neuro: c/w ASA 81mg, atorvastatin 40mg hs  optho following, will reexamine pt tomorrow   Management for electrolyte abnormalities as below and treat UTI as below  S/S eval- pt refused to participate with SLP. pt is alert but refuses to eat, discussed with patient and daughter, who wants to try pureed diet, Understands aspiration risk, wants to hold off ng tube   Neuro checks

## 2024-02-05 NOTE — CHART NOTE - NSCHARTNOTEFT_GEN_A_CORE
IMAGING   MR BRAIN and Orbits w/wo IV Cont    1.  ORBITS:   Unremarkable MR of the orbits.    2.  BRAIN:  Ischemic white matter disease upper range typical for age.   Diffuse brain volume loss overall typical for age with pattern of lateral   ventricular dilatation that likely reflects differential Central cerebral   hemispheric volume loss. Communicating hydrocephalus could be   superimposed but is felt to be less likely.    3. ANTERIOR CIRCULATION:   Intracranial atherosclerosis cavernous and   clinoid segments of the internal carotid arteries, at least   mild-to-moderate on the right, more mild on the left, and each middle   cerebral arterial peripheral branching, at least mild to moderate on each   side.    4. POSTERIOR CIRCULATION:  Attenuated on developmental basis with   superimposed multifocal moderate to severe stenoses primarily within the   left vertebral and basilar arteries. Probable reversal flow from anterior   to posterior collateral circulation to the superior cerebellar and distal   basilar arteries via fetal origin of each posterior cerebral artery    IMPRESSION   Likely altered mental status i/s/o infectious etiology, now improving. Orbital symptoms considering flare of preexisting GCA     RECOMMENDATION   GCA management as per rheum and optho   Aspirin 81 mg daily  Atorvastatin as per 10-year ASCVD risk calculation IMAGING   MR BRAIN and Orbits w/wo IV Cont    1.  ORBITS:   Unremarkable MR of the orbits.    2.  BRAIN:  Ischemic white matter disease upper range typical for age.   Diffuse brain volume loss overall typical for age with pattern of lateral   ventricular dilatation that likely reflects differential Central cerebral   hemispheric volume loss. Communicating hydrocephalus could be   superimposed but is felt to be less likely.    3. ANTERIOR CIRCULATION:   Intracranial atherosclerosis cavernous and   clinoid segments of the internal carotid arteries, at least   mild-to-moderate on the right, more mild on the left, and each middle     cerebral arterial peripheral branching, at least mild to moderate on each   side.    4. POSTERIOR CIRCULATION:  Attenuated on developmental basis with   superimposed multifocal moderate to severe stenoses primarily within the   left vertebral and basilar arteries. Probable reversal flow from anterior   to posterior collateral circulation to the superior cerebellar and distal   basilar arteries via fetal origin of each posterior cerebral artery    IMPRESSION   Likely altered mental status i/s/o infectious etiology, now improving. Orbital symptoms considering flare of preexisting GCA     RECOMMENDATION   GCA management as per rheum and optho   Aspirin 81 mg daily  Atorvastatin as per 10-year ASCVD risk calculation

## 2024-02-05 NOTE — PROGRESS NOTE ADULT - SUBJECTIVE AND OBJECTIVE BOX
STANISLAV MACARIO  [id number]    INTERVAL HPI/OVERNIGHT EVENTS: No acute events. Pt still reporting poor vision. Denies headache, jaw claudication, or PMR symptoms.     MEDICATIONS  (STANDING):  aspirin enteric coated 81 milliGRAM(s) Oral daily  atovaquone  Suspension 1500 milliGRAM(s) Oral daily  calcium carbonate 1250 mG  + Vitamin D (OsCal 500 + D) 1 Tablet(s) Oral daily  carbidopa/levodopa  25/100 1.5 Tablet(s) Oral three times a day  carbidopa/levodopa CR 25/100 1 Tablet(s) Oral at bedtime  dextrose 5%. 1000 milliLiter(s) (50 mL/Hr) IV Continuous <Continuous>  dextrose 50% Injectable 25 Gram(s) IV Push once  dextrose 50% Injectable 12.5 Gram(s) IV Push once  enoxaparin Injectable 40 milliGRAM(s) SubCutaneous every 24 hours  fenofibrate Tablet 145 milliGRAM(s) Oral daily  ferrous    sulfate Liquid 220 milliGRAM(s) Oral daily  folic acid 1 milliGRAM(s) Oral daily  glucagon  Injectable 1 milliGRAM(s) IntraMuscular once  insulin glargine Injectable (LANTUS) 8 Unit(s) SubCutaneous at bedtime  insulin lispro (ADMELOG) corrective regimen sliding scale   SubCutaneous three times a day before meals  metoprolol succinate ER 50 milliGRAM(s) Oral daily  mirtazapine 7.5 milliGRAM(s) Oral daily  pantoprazole    Tablet 40 milliGRAM(s) Oral before breakfast  polyethylene glycol 3350 17 Gram(s) Oral daily  predniSONE   Tablet 50 milliGRAM(s) Oral daily  senna 2 Tablet(s) Oral at bedtime  silver sulfADIAZINE 1% Cream 1 Application(s) Topical two times a day    MEDICATIONS  (PRN):  acetaminophen     Tablet .. 650 milliGRAM(s) Oral every 6 hours PRN Temp greater or equal to 38C (100.4F), Mild Pain (1 - 3)  dextrose Oral Gel 15 Gram(s) Oral once PRN Blood Glucose LESS THAN 70 milliGRAM(s)/deciliter      Allergies    No Known Allergies    Intolerances        Review of Systems:   General: No fevers/chills  HEENT: +vision changes   CVS: No CP  Resp: No SOB  GI: No abdominal pain  MSK: no joint pain   Skin: No new rashes  Neuro: No headaches      Vital Signs Last 24 Hrs  T(C): 36.3 (05 Feb 2024 16:47), Max: 36.4 (05 Feb 2024 05:59)  T(F): 97.4 (05 Feb 2024 16:47), Max: 97.5 (05 Feb 2024 05:59)  HR: 66 (05 Feb 2024 16:47) (64 - 69)  BP: 98/51 (05 Feb 2024 16:47) (98/51 - 143/55)  BP(mean): 56 (05 Feb 2024 05:59) (56 - 56)  RR: 18 (05 Feb 2024 16:47) (16 - 18)  SpO2: 96% (05 Feb 2024 16:47) (95% - 96%)    Parameters below as of 05 Feb 2024 16:47  Patient On (Oxygen Delivery Method): room air        Physical Exam:  General: No apparent distress  HEENT: unable to palpate pulse for temporal arteries   CVS: +S1/S2, RRR, no carotid bruits   Resp: CTAB, no increased WOB  GI: non-tender   MSK: no synovitis   Neuro: AAOx2  Skin: no visible rashes      LABS:                        9.9    11.74 )-----------( 164      ( 05 Feb 2024 06:20 )             29.5     02-05    140  |  106  |  20  ----------------------------<  81  3.8   |  24  |  0.41<L>    Ca    8.4      05 Feb 2024 06:20  Phos  1.7     02-05  Mg     1.80     02-05        Urinalysis Basic - ( 05 Feb 2024 06:20 )    Color: x / Appearance: x / SG: x / pH: x  Gluc: 81 mg/dL / Ketone: x  / Bili: x / Urobili: x   Blood: x / Protein: x / Nitrite: x   Leuk Esterase: x / RBC: x / WBC x   Sq Epi: x / Non Sq Epi: x / Bacteria: x          RADIOLOGY & ADDITIONAL TESTS:  < from: MR Orbits w/wo IV Cont (02.05.24 @ 09:10) >  IMPRESSION:    1.  ORBITS:   Unremarkable MR of the orbits.    2.  BRAIN:  Ischemic white matter disease upper range typical for age.   Diffuse brain volume loss overall typical for age with pattern of lateral   ventricular dilatation that likely reflects differential Central cerebral   hemispheric volume loss. Communicating hydrocephalus could be   superimposed but is felt to be less likely.    3. ANTERIOR CIRCULATION:   Intracranial atherosclerosis cavernous and   clinoid segments of the internal carotid arteries, at least   mild-to-moderate on the right, more mild on the left, and each middle   cerebral arterial peripheral branching, at least mild to moderate on each   side.    4. POSTERIOR CIRCULATION:  Attenuated on developmental basis with   superimposed multifocal moderate to severe stenoses primarily within the   left vertebral and basilar arteries. Probable reversal flow from anterior   to posterior collateral circulation to the superior cerebellar and distal   basilar arteries via fetal origin of each posterior cerebral artery.    --- End of Report ---    < end of copied text >

## 2024-02-05 NOTE — PROGRESS NOTE ADULT - ATTENDING COMMENTS
Patient seen and examined at bedside. Agree with assessment and plan as above. H/o biopsy proven GCA inadequately treated. Advised family to get us the biopsy report. Consider slow steroid taper for now. Not a good candidate for either long term steroids or Actemra injections given debility but will need to determine appropriate therapy if she is able to establish outpatient care. Continue steroid taper as mentioned above for now.

## 2024-02-05 NOTE — PROGRESS NOTE ADULT - SUBJECTIVE AND OBJECTIVE BOX
Dr. Tiana Roy  Pager [phone]    PROGRESS NOTE:     Patient is a 79y old  Female who presents with a chief complaint of AMS, Fall (04 Feb 2024 17:14)      SUBJECTIVE / OVERNIGHT EVENTS: pt reports still poor vision   ADDITIONAL REVIEW OF SYSTEMS: afebrile, no chest pain    MEDICATIONS  (STANDING):  aspirin enteric coated 81 milliGRAM(s) Oral daily  atovaquone  Suspension 1500 milliGRAM(s) Oral daily  calcium carbonate 1250 mG  + Vitamin D (OsCal 500 + D) 1 Tablet(s) Oral daily  carbidopa/levodopa  25/100 1.5 Tablet(s) Oral three times a day  carbidopa/levodopa CR 25/100 1 Tablet(s) Oral at bedtime  dextrose 5%. 1000 milliLiter(s) (50 mL/Hr) IV Continuous <Continuous>  dextrose 50% Injectable 25 Gram(s) IV Push once  dextrose 50% Injectable 12.5 Gram(s) IV Push once  enoxaparin Injectable 40 milliGRAM(s) SubCutaneous every 24 hours  fenofibrate Tablet 145 milliGRAM(s) Oral daily  ferrous    sulfate Liquid 220 milliGRAM(s) Oral daily  folic acid 1 milliGRAM(s) Oral daily  glucagon  Injectable 1 milliGRAM(s) IntraMuscular once  insulin glargine Injectable (LANTUS) 8 Unit(s) SubCutaneous at bedtime  insulin lispro (ADMELOG) corrective regimen sliding scale   SubCutaneous three times a day before meals  metoprolol succinate ER 50 milliGRAM(s) Oral daily  mirtazapine 7.5 milliGRAM(s) Oral daily  pantoprazole    Tablet 40 milliGRAM(s) Oral before breakfast  polyethylene glycol 3350 17 Gram(s) Oral daily  predniSONE   Tablet 50 milliGRAM(s) Oral daily  senna 2 Tablet(s) Oral at bedtime  silver sulfADIAZINE 1% Cream 1 Application(s) Topical two times a day    MEDICATIONS  (PRN):  acetaminophen     Tablet .. 650 milliGRAM(s) Oral every 6 hours PRN Temp greater or equal to 38C (100.4F), Mild Pain (1 - 3)  dextrose Oral Gel 15 Gram(s) Oral once PRN Blood Glucose LESS THAN 70 milliGRAM(s)/deciliter      CAPILLARY BLOOD GLUCOSE      POCT Blood Glucose.: 91 mg/dL (05 Feb 2024 06:20)  POCT Blood Glucose.: 187 mg/dL (04 Feb 2024 21:59)    I&O's Summary    04 Feb 2024 07:01  -  05 Feb 2024 07:00  --------------------------------------------------------  IN: 0 mL / OUT: 1 mL / NET: -1 mL        PHYSICAL EXAM:  Vital Signs Last 24 Hrs  T(C): 36.4 (05 Feb 2024 05:59), Max: 36.4 (05 Feb 2024 05:59)  T(F): 97.5 (05 Feb 2024 05:59), Max: 97.5 (05 Feb 2024 05:59)  HR: 69 (05 Feb 2024 05:59) (64 - 69)  BP: 103/43 (05 Feb 2024 05:59) (103/43 - 143/55)  BP(mean): 56 (05 Feb 2024 05:59) (56 - 56)  RR: 16 (05 Feb 2024 05:59) (16 - 17)  SpO2: 96% (05 Feb 2024 05:59) (95% - 96%)    Parameters below as of 05 Feb 2024 05:59  Patient On (Oxygen Delivery Method): room air      CONSTITUTIONAL:   Heent: poor vision/vision loss b/l  RESPIRATORY: Normal respiratory effort; lungs are clear to auscultation bilaterally  CARDIOVASCULAR: Regular rate and rhythm, normal S1 and S2, no murmur/rub/gallop; No lower extremity edema; Peripheral pulses are 2+ bilaterally  ABDOMEN: Nontender to palpation, normoactive bowel sounds, no rebound/guarding; No hepatosplenomegaly  MUSCULOSKELETAL: no clubbing or cyanosis of digits; no joint swelling or tenderness to palpation  PSYCH: A+O to person, place    LABS:                        9.9    11.74 )-----------( 164      ( 05 Feb 2024 06:20 )             29.5     02-05    140  |  106  |  20  ----------------------------<  81  3.8   |  24  |  0.41<L>    Ca    8.4      05 Feb 2024 06:20  Phos  1.7     02-05  Mg     1.80     02-05            Urinalysis Basic - ( 05 Feb 2024 06:20 )    Color: x / Appearance: x / SG: x / pH: x  Gluc: 81 mg/dL / Ketone: x  / Bili: x / Urobili: x   Blood: x / Protein: x / Nitrite: x   Leuk Esterase: x / RBC: x / WBC x   Sq Epi: x / Non Sq Epi: x / Bacteria: x          RADIOLOGY & ADDITIONAL TESTS:  Results Reviewed:   Imaging Personally Reviewed:  < from: MR Orbits w/wo IV Cont (02.05.24 @ 09:10) >  1.  ORBITS:   Unremarkable MR of the orbits.    2.  BRAIN:  Ischemic white matter disease upper range typical for age.   Diffuse brain volume loss overall typical for age with pattern of lateral   ventricular dilatation that likely reflects differential Central cerebral   hemispheric volume loss. Communicating hydrocephalus could be   superimposed but is felt to be less likely.    3. ANTERIOR CIRCULATION:   Intracranial atherosclerosis cavernous and   clinoid segments of the internal carotid arteries, at least   mild-to-moderate on the right, more mild on the left, and each middle   cerebral arterial peripheral branching, at least mild to moderate on each   side.    4. POSTERIOR CIRCULATION:  Attenuated on developmental basis with   superimposed multifocal moderate to severe stenoses primarily within the   left vertebral and basilar arteries. Probable reversal flow from anterior   to posterior collateral circulation to the superior cerebellar and distal   basilar arteries via fetal origin of each posterior cerebral artery.        Electrocardiogram Personally Reviewed:    COORDINATION OF CARE:  Care Discussed with Consultants/Other Providers [Y/N]: rheum fellow Dr. Contreras, c/w prednisone 50 mg qd,   Prior or Outpatient Records Reviewed [Y/N]:

## 2024-02-05 NOTE — SWALLOW BEDSIDE ASSESSMENT ADULT - DIET PRIOR TO ADMI
Puree with Thin Liquids per transfer paperwork
Puree with Thin Liquids per [facility] paperwork

## 2024-02-05 NOTE — SWALLOW BEDSIDE ASSESSMENT ADULT - COMMENTS
Hospitalist 2/4, "78 yo F PMH IDDM, HTN, HLD, OP, Parkinson's disease, GCA (recently diagnosed) BIBEMS from [facility] s/p Fall and AMS. found to have toxic/metabolic derangement with hypernatremia and acute cystitis, and additionally with cf GCA flare with worsening vision."    CXR 1/30: IMPRESSION: Clear lungs.    Patient received upright awake/ alert, Setswana speaking ID #[id number], able to make basic wants/ needs known and follow simple directives.

## 2024-02-06 LAB
ANION GAP SERPL CALC-SCNC: 9 MMOL/L — SIGNIFICANT CHANGE UP (ref 7–14)
BUN SERPL-MCNC: 17 MG/DL — SIGNIFICANT CHANGE UP (ref 7–23)
CALCIUM SERPL-MCNC: 7.8 MG/DL — LOW (ref 8.4–10.5)
CHLORIDE SERPL-SCNC: 108 MMOL/L — HIGH (ref 98–107)
CO2 SERPL-SCNC: 25 MMOL/L — SIGNIFICANT CHANGE UP (ref 22–31)
CREAT SERPL-MCNC: 0.37 MG/DL — LOW (ref 0.5–1.3)
EGFR: 102 ML/MIN/1.73M2 — SIGNIFICANT CHANGE UP
GLUCOSE BLDC GLUCOMTR-MCNC: 146 MG/DL — HIGH (ref 70–99)
GLUCOSE SERPL-MCNC: 96 MG/DL — SIGNIFICANT CHANGE UP (ref 70–99)
HCT VFR BLD CALC: 28.8 % — LOW (ref 34.5–45)
HGB BLD-MCNC: 9.6 G/DL — LOW (ref 11.5–15.5)
MAGNESIUM SERPL-MCNC: 1.6 MG/DL — SIGNIFICANT CHANGE UP (ref 1.6–2.6)
MCHC RBC-ENTMCNC: 27.9 PG — SIGNIFICANT CHANGE UP (ref 27–34)
MCHC RBC-ENTMCNC: 33.3 GM/DL — SIGNIFICANT CHANGE UP (ref 32–36)
MCV RBC AUTO: 83.7 FL — SIGNIFICANT CHANGE UP (ref 80–100)
NRBC # BLD: 0 /100 WBCS — SIGNIFICANT CHANGE UP (ref 0–0)
NRBC # FLD: 0.02 K/UL — HIGH (ref 0–0)
PHOSPHATE SERPL-MCNC: 2.6 MG/DL — SIGNIFICANT CHANGE UP (ref 2.5–4.5)
PLATELET # BLD AUTO: 130 K/UL — LOW (ref 150–400)
POTASSIUM SERPL-MCNC: 3.7 MMOL/L — SIGNIFICANT CHANGE UP (ref 3.5–5.3)
POTASSIUM SERPL-SCNC: 3.7 MMOL/L — SIGNIFICANT CHANGE UP (ref 3.5–5.3)
RBC # BLD: 3.44 M/UL — LOW (ref 3.8–5.2)
RBC # FLD: 22.6 % — HIGH (ref 10.3–14.5)
SODIUM SERPL-SCNC: 142 MMOL/L — SIGNIFICANT CHANGE UP (ref 135–145)
WBC # BLD: 5.49 K/UL — SIGNIFICANT CHANGE UP (ref 3.8–10.5)
WBC # FLD AUTO: 5.49 K/UL — SIGNIFICANT CHANGE UP (ref 3.8–10.5)

## 2024-02-06 PROCEDURE — 99233 SBSQ HOSP IP/OBS HIGH 50: CPT

## 2024-02-06 PROCEDURE — 99232 SBSQ HOSP IP/OBS MODERATE 35: CPT

## 2024-02-06 RX ADMIN — Medication 1 APPLICATION(S): at 07:06

## 2024-02-06 NOTE — PROGRESS NOTE ADULT - SUBJECTIVE AND OBJECTIVE BOX
[facility] DEPARTMENT OF OPHTHALMOLOGY  ------------------------------------------------------------------------------  Fernando Peace MD, PGY-2  Contact: TEAMS  ------------------------------------------------------------------------------    Interval History: No acute events overnight. Patient without any improvement in vision. S/p course of IV steroids.     MEDICATIONS  (STANDING):  aspirin enteric coated 81 milliGRAM(s) Oral daily  atovaquone  Suspension 1500 milliGRAM(s) Oral daily  calcium carbonate 1250 mG  + Vitamin D (OsCal 500 + D) 1 Tablet(s) Oral daily  carbidopa/levodopa  25/100 1.5 Tablet(s) Oral three times a day  carbidopa/levodopa CR 25/100 1 Tablet(s) Oral at bedtime  dextrose 5%. 1000 milliLiter(s) (50 mL/Hr) IV Continuous <Continuous>  dextrose 50% Injectable 25 Gram(s) IV Push once  dextrose 50% Injectable 12.5 Gram(s) IV Push once  enoxaparin Injectable 40 milliGRAM(s) SubCutaneous every 24 hours  fenofibrate Tablet 145 milliGRAM(s) Oral daily  ferrous    sulfate Liquid 220 milliGRAM(s) Oral daily  folic acid 1 milliGRAM(s) Oral daily  glucagon  Injectable 1 milliGRAM(s) IntraMuscular once  insulin glargine Injectable (LANTUS) 8 Unit(s) SubCutaneous at bedtime  insulin lispro (ADMELOG) corrective regimen sliding scale   SubCutaneous three times a day before meals  metoprolol succinate ER 50 milliGRAM(s) Oral daily  mirtazapine 7.5 milliGRAM(s) Oral daily  pantoprazole    Tablet 40 milliGRAM(s) Oral before breakfast  polyethylene glycol 3350 17 Gram(s) Oral daily  predniSONE   Tablet 50 milliGRAM(s) Oral daily  senna 2 Tablet(s) Oral at bedtime  silver sulfADIAZINE 1% Cream 1 Application(s) Topical two times a day    MEDICATIONS  (PRN):  acetaminophen     Tablet .. 650 milliGRAM(s) Oral every 6 hours PRN Temp greater or equal to 38C (100.4F), Mild Pain (1 - 3)  dextrose Oral Gel 15 Gram(s) Oral once PRN Blood Glucose LESS THAN 70 milliGRAM(s)/deciliter      VITALS: T(C): 36.5 (02-06-24 @ 04:48)  T(F): 97.7 (02-06-24 @ 04:48), Max: 97.7 (02-06-24 @ 04:48)  HR: 68 (02-06-24 @ 04:48) (65 - 68)  BP: 121/62 (02-06-24 @ 04:48) (98/51 - 121/62)  RR:  (16 - 18)  SpO2:  (96% - 99%)  Wt(kg): --  General: AAO x 3, appropriate mood and affect    Ophthalmology Exam:  Visual acuity (sc): NLP OU (Poor historian but doesn't blink to threat, doesn't blink to light)  Pupils: non-reactive OU  Extraocular movements (EOMs): Full OU, no pain,   Confrontational Visual Field (CVF): charles DUE TO VISION  Color Plates: charles DUE TO VISION    Pen Light Exam (PLE)  External: Flat OU  Lids/Lashes/Lacrimal Ducts: Flat OU    Sclera/Conjunctiva: W+Q OU  Cornea: Cl OU  Anterior Chamber: D+F OU    Iris: Flat OU  Lens: PCIOL OU    Fundus Exam: dilated with 1% tropicamide and 2.5% phenylephrine  Approval obtained from primary team for dilation  Patient aware that pupils can remained dilated for at least 4-6 hours  Exam performed with 20D lens    Vitreous: wnl OU  Disc, cup/disc:  0.3 OU no edema appreciated   Macula: wnl OU  Vessels: tortuous OU  Periphery: wnl OU    Labs/Imaging:  Per [facility] records:  Surgical Path- temporal artery biopsy:  12/14/2023 - shows evidence of GCA

## 2024-02-06 NOTE — PROGRESS NOTE ADULT - ASSESSMENT
80 yo F PMH IDDM, HTN, HLD, OP, Parkinson's, GCA brought in by ems for being found down after a fall in the nursing home. Patient not able to provide much hx, however chart review down with access to [facility] medical record. Patient was recently admitted and treated with IV Solu-Medrol and trial of IVIG for biopsy confirmed giant cell arteritis. Per last ophthalmology note patient was hand motion in both eyes with non-reactive pupils. Patient was discharged in this state on 1/3/2024.     #GCA flare OU, now with NLP vision   -Patient's last documented vision was Hand motion in both eyes as of 12/18/2023.   - did not follow up with outpatient rheum or ophtho following discharge    -Now appears No light perception (NLP) in both eyes, pupils non-reactive secondary to GCA flare  - Ocular exam otherwise within normal limits.  - s/p treatment with IV steroids as per rheum, to be transitioned to oral course of steroids   - rest of care as per primary team and rheumatology   - Patient can follow up with ophtho following discharge at address below. Patient would also benefit from follow up with low-vision centers.     SDW Dr. Lambert     Outpatient follow-up: Patient should follow-up with his/her ophthalmologist or with [facility] Department of Ophthalmology upon discharge at the address below     [facility] Department of Ophthalmology  [address] [place] [address]. [address]  [place] [address]  [phone]

## 2024-02-06 NOTE — PROGRESS NOTE ADULT - ASSESSMENT
78 yo F PMH IDDM, HTN, HLD, OP, Parkinson's disease, GCA (recently diagnosed) BIBEMS from [facility] s/p Fall and AMS. found to have toxic/metabolic derangement with hypernatremia and acute cystitis, and additionally with cf GCA flare with worsening vision.

## 2024-02-06 NOTE — PROGRESS NOTE ADULT - SUBJECTIVE AND OBJECTIVE BOX
Dr. Tiana Roy  Pager [phone]    PROGRESS NOTE:     Patient is a 79y old  Female who presents with a chief complaint of GCA flare (05 Feb 2024 16:29)      SUBJECTIVE / OVERNIGHT EVENTS: reports no improvement in vision  ADDITIONAL REVIEW OF SYSTEMS: no headache, denies chest pain/sob    MEDICATIONS  (STANDING):  aspirin enteric coated 81 milliGRAM(s) Oral daily  atovaquone  Suspension 1500 milliGRAM(s) Oral daily  calcium carbonate 1250 mG  + Vitamin D (OsCal 500 + D) 1 Tablet(s) Oral daily  carbidopa/levodopa  25/100 1.5 Tablet(s) Oral three times a day  carbidopa/levodopa CR 25/100 1 Tablet(s) Oral at bedtime  dextrose 5%. 1000 milliLiter(s) (50 mL/Hr) IV Continuous <Continuous>  dextrose 50% Injectable 25 Gram(s) IV Push once  dextrose 50% Injectable 12.5 Gram(s) IV Push once  enoxaparin Injectable 40 milliGRAM(s) SubCutaneous every 24 hours  fenofibrate Tablet 145 milliGRAM(s) Oral daily  ferrous    sulfate Liquid 220 milliGRAM(s) Oral daily  folic acid 1 milliGRAM(s) Oral daily  glucagon  Injectable 1 milliGRAM(s) IntraMuscular once  insulin glargine Injectable (LANTUS) 8 Unit(s) SubCutaneous at bedtime  insulin lispro (ADMELOG) corrective regimen sliding scale   SubCutaneous three times a day before meals  metoprolol succinate ER 50 milliGRAM(s) Oral daily  mirtazapine 7.5 milliGRAM(s) Oral daily  pantoprazole    Tablet 40 milliGRAM(s) Oral before breakfast  polyethylene glycol 3350 17 Gram(s) Oral daily  predniSONE   Tablet 50 milliGRAM(s) Oral daily  senna 2 Tablet(s) Oral at bedtime  silver sulfADIAZINE 1% Cream 1 Application(s) Topical two times a day    MEDICATIONS  (PRN):  acetaminophen     Tablet .. 650 milliGRAM(s) Oral every 6 hours PRN Temp greater or equal to 38C (100.4F), Mild Pain (1 - 3)  dextrose Oral Gel 15 Gram(s) Oral once PRN Blood Glucose LESS THAN 70 milliGRAM(s)/deciliter      CAPILLARY BLOOD GLUCOSE      POCT Blood Glucose.: 146 mg/dL (06 Feb 2024 12:04)  POCT Blood Glucose.: 114 mg/dL (05 Feb 2024 17:25)    I&O's Summary    05 Feb 2024 07:01  -  06 Feb 2024 07:00  --------------------------------------------------------  IN: 0 mL / OUT: 1300 mL / NET: -1300 mL        PHYSICAL EXAM:  Vital Signs Last 24 Hrs  T(C): 36.9 (06 Feb 2024 13:25), Max: 36.9 (06 Feb 2024 13:25)  T(F): 98.4 (06 Feb 2024 13:25), Max: 98.4 (06 Feb 2024 13:25)  HR: 77 (06 Feb 2024 13:25) (65 - 77)  BP: 120/60 (06 Feb 2024 13:25) (98/51 - 121/62)  BP(mean): --  RR: 18 (06 Feb 2024 13:25) (16 - 18)  SpO2: 98% (06 Feb 2024 13:25) (96% - 99%)    Parameters below as of 06 Feb 2024 13:25  Patient On (Oxygen Delivery Method): room air      CONSTITUTIONAL:   RESPIRATORY: Normal respiratory effort; lungs are clear to auscultation bilaterally  CARDIOVASCULAR: Regular rate and rhythm, normal S1 and S2, no murmur/rub/gallop; No lower extremity edema; Peripheral pulses are 2+ bilaterally  ABDOMEN: Nontender to palpation, normoactive bowel sounds, no rebound/guarding; No hepatosplenomegaly  MUSCULOSKELETAL: no clubbing or cyanosis of digits; no joint swelling or tenderness to palpation  PSYCH: A+O to person, place, and time; affect appropriate    LABS:                        9.6    5.49  )-----------( 130      ( 06 Feb 2024 07:00 )             28.8     02-06    142  |  108<H>  |  17  ----------------------------<  96  3.7   |  25  |  0.37<L>    Ca    7.8<L>      06 Feb 2024 07:00  Phos  2.6     02-06  Mg     1.60     02-06            Urinalysis Basic - ( 06 Feb 2024 07:00 )    Color: x / Appearance: x / SG: x / pH: x  Gluc: 96 mg/dL / Ketone: x  / Bili: x / Urobili: x   Blood: x / Protein: x / Nitrite: x   Leuk Esterase: x / RBC: x / WBC x   Sq Epi: x / Non Sq Epi: x / Bacteria: x          RADIOLOGY & ADDITIONAL TESTS:  Results Reviewed:   Imaging Personally Reviewed:  Electrocardiogram Personally Reviewed:    COORDINATION OF CARE:  Care Discussed with Consultants/Other Providers [Y/N]:  Prior or Outpatient Records Reviewed [Y/N]:   Dr. Tiana Roy  Pager [phone]    PROGRESS NOTE:     Patient is a 79y old  Female who presents with a chief complaint of GCA flare (05 Feb 2024 16:29)      SUBJECTIVE / OVERNIGHT EVENTS: reports no improvement in vision  ADDITIONAL REVIEW OF SYSTEMS: no headache, denies chest pain/sob    MEDICATIONS  (STANDING):  aspirin enteric coated 81 milliGRAM(s) Oral daily  atovaquone  Suspension 1500 milliGRAM(s) Oral daily  calcium carbonate 1250 mG  + Vitamin D (OsCal 500 + D) 1 Tablet(s) Oral daily  carbidopa/levodopa  25/100 1.5 Tablet(s) Oral three times a day  carbidopa/levodopa CR 25/100 1 Tablet(s) Oral at bedtime  dextrose 5%. 1000 milliLiter(s) (50 mL/Hr) IV Continuous <Continuous>  dextrose 50% Injectable 25 Gram(s) IV Push once  dextrose 50% Injectable 12.5 Gram(s) IV Push once  enoxaparin Injectable 40 milliGRAM(s) SubCutaneous every 24 hours  fenofibrate Tablet 145 milliGRAM(s) Oral daily  ferrous    sulfate Liquid 220 milliGRAM(s) Oral daily  folic acid 1 milliGRAM(s) Oral daily  glucagon  Injectable 1 milliGRAM(s) IntraMuscular once  insulin glargine Injectable (LANTUS) 8 Unit(s) SubCutaneous at bedtime  insulin lispro (ADMELOG) corrective regimen sliding scale   SubCutaneous three times a day before meals  metoprolol succinate ER 50 milliGRAM(s) Oral daily  mirtazapine 7.5 milliGRAM(s) Oral daily  pantoprazole    Tablet 40 milliGRAM(s) Oral before breakfast  polyethylene glycol 3350 17 Gram(s) Oral daily  predniSONE   Tablet 50 milliGRAM(s) Oral daily  senna 2 Tablet(s) Oral at bedtime  silver sulfADIAZINE 1% Cream 1 Application(s) Topical two times a day    MEDICATIONS  (PRN):  acetaminophen     Tablet .. 650 milliGRAM(s) Oral every 6 hours PRN Temp greater or equal to 38C (100.4F), Mild Pain (1 - 3)  dextrose Oral Gel 15 Gram(s) Oral once PRN Blood Glucose LESS THAN 70 milliGRAM(s)/deciliter      CAPILLARY BLOOD GLUCOSE      POCT Blood Glucose.: 146 mg/dL (06 Feb 2024 12:04)  POCT Blood Glucose.: 114 mg/dL (05 Feb 2024 17:25)    I&O's Summary    05 Feb 2024 07:01  -  06 Feb 2024 07:00  --------------------------------------------------------  IN: 0 mL / OUT: 1300 mL / NET: -1300 mL        PHYSICAL EXAM:  Vital Signs Last 24 Hrs  T(C): 36.9 (06 Feb 2024 13:25), Max: 36.9 (06 Feb 2024 13:25)  T(F): 98.4 (06 Feb 2024 13:25), Max: 98.4 (06 Feb 2024 13:25)  HR: 77 (06 Feb 2024 13:25) (65 - 77)  BP: 120/60 (06 Feb 2024 13:25) (98/51 - 121/62)  BP(mean): --  RR: 18 (06 Feb 2024 13:25) (16 - 18)  SpO2: 98% (06 Feb 2024 13:25) (96% - 99%)    Parameters below as of 06 Feb 2024 13:25  Patient On (Oxygen Delivery Method): room air      CONSTITUTIONAL: frail elderly woman, legally blind   heent: no vision, no light perception   RESPIRATORY: Normal respiratory effort; lungs are clear to auscultation bilaterally  CARDIOVASCULAR: Regular rate and rhythm, normal S1 and S2, no murmur/rub/gallop; No lower extremity edema; Peripheral pulses are 2+ bilaterally  ABDOMEN: Nontender to palpation, normoactive bowel sounds, no rebound/guarding; No hepatosplenomegaly  MUSCULOSKELETAL: no clubbing or cyanosis of digits; no joint swelling or tenderness to palpation  PSYCH: A+Ox2 to person, place; affect appropriate    LABS:                        9.6    5.49  )-----------( 130      ( 06 Feb 2024 07:00 )             28.8     02-06    142  |  108<H>  |  17  ----------------------------<  96  3.7   |  25  |  0.37<L>    Ca    7.8<L>      06 Feb 2024 07:00  Phos  2.6     02-06  Mg     1.60     02-06            Urinalysis Basic - ( 06 Feb 2024 07:00 )    Color: x / Appearance: x / SG: x / pH: x  Gluc: 96 mg/dL / Ketone: x  / Bili: x / Urobili: x   Blood: x / Protein: x / Nitrite: x   Leuk Esterase: x / RBC: x / WBC x   Sq Epi: x / Non Sq Epi: x / Bacteria: x          RADIOLOGY & ADDITIONAL TESTS:  Results Reviewed:   Imaging Personally Reviewed:  Electrocardiogram Personally Reviewed:    COORDINATION OF CARE:  Care Discussed with Consultants/Other Providers [Y/N]:  Prior or Outpatient Records Reviewed [Y/N]:

## 2024-02-06 NOTE — PROGRESS NOTE ADULT - PROBLEM SELECTOR PLAN 2
Patient is A&Ox0 on admission, currently improved to ANO2-3. AMS likely 2/2 toxic metabolic derangements as below, but per neuro less likely neurovascular etiology. Also with known parkinson's disease for the past 8 months.  Per daughter at baseline patient is A&Ox3 and conversant but has been deteriorating since mid-jan.     CTH, CTA H/N with "No acute intracranial hemorrhage, mass, or large vessel occlusion. No aneurysm. prominent lateral and third ventricles; severely diminutive veribrobasilar system with small areas of minimal filling in   the basilar artery. Prominent b/l PCOMs"  MRI/MRA brain/orbit: Unremarkable MR of the orbits. No acute infarct.   intracranial atherosclerosis cavernous and clinoid segments of the internal carotid arteries, at least mild-to-moderate on the right, more mild on the left  per neuro: c/w ASA 81mg, atorvastatin 40mg hs  optho following, will reexamine pt tomorrow   Management for electrolyte abnormalities as below and treat UTI as below  S/S eval- pt refused to participate with SLP. pt is alert but refuses to eat, discussed with patient and daughter, who wants to try pureed diet, Understands aspiration risk, wants to hold off ng tube   Neuro checks

## 2024-02-07 PROCEDURE — 99232 SBSQ HOSP IP/OBS MODERATE 35: CPT

## 2024-02-07 RX ORDER — METHOTREXATE 2.5 MG/1
1 TABLET ORAL
Refills: 0 | DISCHARGE

## 2024-02-07 RX ORDER — ATOVAQUONE 750 MG/5ML
10 SUSPENSION ORAL
Qty: 0 | Refills: 0 | DISCHARGE
Start: 2024-02-07

## 2024-02-07 RX ADMIN — Medication 1 APPLICATION(S): at 17:29

## 2024-02-07 RX ADMIN — INSULIN GLARGINE 8 UNIT(S): 100 INJECTION, SOLUTION SUBCUTANEOUS at 23:06

## 2024-02-07 NOTE — PROGRESS NOTE ADULT - SUBJECTIVE AND OBJECTIVE BOX
Dr. Tiana Roy  Pager [phone]    PROGRESS NOTE:     Patient is a 79y old  Female who presents with a chief complaint of vision loss (06 Feb 2024 15:23)      SUBJECTIVE / OVERNIGHT EVENTS: denies chest pain or sob   ADDITIONAL REVIEW OF SYSTEMS: afebrile , no vision improvement , no light perception     MEDICATIONS  (STANDING):  aspirin enteric coated 81 milliGRAM(s) Oral daily  atovaquone  Suspension 1500 milliGRAM(s) Oral daily  calcium carbonate 1250 mG  + Vitamin D (OsCal 500 + D) 1 Tablet(s) Oral daily  carbidopa/levodopa  25/100 1.5 Tablet(s) Oral three times a day  carbidopa/levodopa CR 25/100 1 Tablet(s) Oral at bedtime  dextrose 5%. 1000 milliLiter(s) (50 mL/Hr) IV Continuous <Continuous>  dextrose 50% Injectable 25 Gram(s) IV Push once  dextrose 50% Injectable 12.5 Gram(s) IV Push once  enoxaparin Injectable 40 milliGRAM(s) SubCutaneous every 24 hours  fenofibrate Tablet 145 milliGRAM(s) Oral daily  ferrous    sulfate Liquid 220 milliGRAM(s) Oral daily  folic acid 1 milliGRAM(s) Oral daily  glucagon  Injectable 1 milliGRAM(s) IntraMuscular once  insulin glargine Injectable (LANTUS) 8 Unit(s) SubCutaneous at bedtime  insulin lispro (ADMELOG) corrective regimen sliding scale   SubCutaneous three times a day before meals  metoprolol succinate ER 50 milliGRAM(s) Oral daily  mirtazapine 7.5 milliGRAM(s) Oral daily  pantoprazole    Tablet 40 milliGRAM(s) Oral before breakfast  polyethylene glycol 3350 17 Gram(s) Oral daily  predniSONE   Tablet 50 milliGRAM(s) Oral daily  senna 2 Tablet(s) Oral at bedtime  silver sulfADIAZINE 1% Cream 1 Application(s) Topical two times a day    MEDICATIONS  (PRN):  acetaminophen     Tablet .. 650 milliGRAM(s) Oral every 6 hours PRN Temp greater or equal to 38C (100.4F), Mild Pain (1 - 3)  dextrose Oral Gel 15 Gram(s) Oral once PRN Blood Glucose LESS THAN 70 milliGRAM(s)/deciliter      CAPILLARY BLOOD GLUCOSE      POCT Blood Glucose.: 172 mg/dL (07 Feb 2024 12:28)  POCT Blood Glucose.: 116 mg/dL (07 Feb 2024 08:50)  POCT Blood Glucose.: 137 mg/dL (06 Feb 2024 17:12)    I&O's Summary      PHYSICAL EXAM:  Vital Signs Last 24 Hrs  T(C): 36.7 (07 Feb 2024 13:00), Max: 36.7 (07 Feb 2024 13:00)  T(F): 98 (07 Feb 2024 13:00), Max: 98 (07 Feb 2024 13:00)  HR: 79 (07 Feb 2024 13:00) (72 - 79)  BP: 116/56 (07 Feb 2024 13:00) (95/50 - 116/56)  BP(mean): --  RR: 18 (07 Feb 2024 13:00) (18 - 18)  SpO2: 99% (07 Feb 2024 13:00) (97% - 99%)    Parameters below as of 07 Feb 2024 13:00  Patient On (Oxygen Delivery Method): room air      CONSTITUTIONAL: frail elderly woman, legally blind   heent: no vision, no light perception   RESPIRATORY: Normal respiratory effort; lungs are clear to auscultation bilaterally  CARDIOVASCULAR: Regular rate and rhythm, normal S1 and S2, no murmur/rub/gallop; No lower extremity edema; Peripheral pulses are 2+ bilaterally  ABDOMEN: Nontender to palpation, normoactive bowel sounds, no rebound/guarding; No hepatosplenomegaly  MUSCULOSKELETAL: no clubbing or cyanosis of digits; no joint swelling or tenderness to palpation  PSYCH: A+Ox2 to person, place; affect appropriate      LABS:                        9.6    5.49  )-----------( 130      ( 06 Feb 2024 07:00 )             28.8     02-06    142  |  108<H>  |  17  ----------------------------<  96  3.7   |  25  |  0.37<L>    Ca    7.8<L>      06 Feb 2024 07:00  Phos  2.6     02-06  Mg     1.60     02-06            Urinalysis Basic - ( 06 Feb 2024 07:00 )    Color: x / Appearance: x / SG: x / pH: x  Gluc: 96 mg/dL / Ketone: x  / Bili: x / Urobili: x   Blood: x / Protein: x / Nitrite: x   Leuk Esterase: x / RBC: x / WBC x   Sq Epi: x / Non Sq Epi: x / Bacteria: x          RADIOLOGY & ADDITIONAL TESTS:  Results Reviewed:   Imaging Personally Reviewed:  Electrocardiogram Personally Reviewed:    COORDINATION OF CARE:  Care Discussed with Consultants/Other Providers [Y/N]:  Prior or Outpatient Records Reviewed [Y/N]:

## 2024-02-07 NOTE — CHART NOTE - NSCHARTNOTEFT_GEN_A_CORE
Medicine NP    Med Rec reviewed. Patient has refused last 2 doses of oral steroid.   Pt seen with primary nurse at bedside. Patient is refusing medication by stating "not now" and covering her face with her sheet. Patient's daughter called and had an opportunity to speak with patient during this interaction. Pt daughter attempted to encourage PO medication. Patient is responding appropriately to questions, however she continues to adamantly refuse to take oral medication or to have peripheral access placed to administer IV steroid. Daughter states "mom is tired of the hospital. I know she is sad". Family plans to visit this afternoon and will continue to encourage PO steriod. Alternate IV steroid dose ordered if able to obtain IV access.    Plan  - Continue current treatment  - D/w Dr. Roy           - Will continue to follow up

## 2024-02-07 NOTE — PROGRESS NOTE ADULT - ASSESSMENT
78 yo F PMH IDDM, HTN, HLD, OP, Parkinson's disease, GCA (recently diagnosed) BIBEMS from [facility] s/p Fall and AMS. found to have toxic/metabolic derangement with hypernatremia and acute cystitis, and additionally with cf GCA flare with worsening vision.

## 2024-02-08 LAB
ANION GAP SERPL CALC-SCNC: 9 MMOL/L — SIGNIFICANT CHANGE UP (ref 7–14)
BUN SERPL-MCNC: 16 MG/DL — SIGNIFICANT CHANGE UP (ref 7–23)
CALCIUM SERPL-MCNC: 8.3 MG/DL — LOW (ref 8.4–10.5)
CHLORIDE SERPL-SCNC: 106 MMOL/L — SIGNIFICANT CHANGE UP (ref 98–107)
CO2 SERPL-SCNC: 24 MMOL/L — SIGNIFICANT CHANGE UP (ref 22–31)
CREAT SERPL-MCNC: 0.33 MG/DL — LOW (ref 0.5–1.3)
EGFR: 105 ML/MIN/1.73M2 — SIGNIFICANT CHANGE UP
GLUCOSE SERPL-MCNC: 82 MG/DL — SIGNIFICANT CHANGE UP (ref 70–99)
HCT VFR BLD CALC: 30 % — LOW (ref 34.5–45)
HGB BLD-MCNC: 9.9 G/DL — LOW (ref 11.5–15.5)
MAGNESIUM SERPL-MCNC: 1.7 MG/DL — SIGNIFICANT CHANGE UP (ref 1.6–2.6)
MCHC RBC-ENTMCNC: 28.4 PG — SIGNIFICANT CHANGE UP (ref 27–34)
MCHC RBC-ENTMCNC: 33 GM/DL — SIGNIFICANT CHANGE UP (ref 32–36)
MCV RBC AUTO: 86 FL — SIGNIFICANT CHANGE UP (ref 80–100)
NRBC # BLD: 0 /100 WBCS — SIGNIFICANT CHANGE UP (ref 0–0)
NRBC # FLD: 0 K/UL — SIGNIFICANT CHANGE UP (ref 0–0)
PHOSPHATE SERPL-MCNC: 2.2 MG/DL — LOW (ref 2.5–4.5)
PLATELET # BLD AUTO: 150 K/UL — SIGNIFICANT CHANGE UP (ref 150–400)
POTASSIUM SERPL-MCNC: 3.8 MMOL/L — SIGNIFICANT CHANGE UP (ref 3.5–5.3)
POTASSIUM SERPL-SCNC: 3.8 MMOL/L — SIGNIFICANT CHANGE UP (ref 3.5–5.3)
RBC # BLD: 3.49 M/UL — LOW (ref 3.8–5.2)
RBC # FLD: 22.4 % — HIGH (ref 10.3–14.5)
SODIUM SERPL-SCNC: 139 MMOL/L — SIGNIFICANT CHANGE UP (ref 135–145)
WBC # BLD: 5.25 K/UL — SIGNIFICANT CHANGE UP (ref 3.8–10.5)
WBC # FLD AUTO: 5.25 K/UL — SIGNIFICANT CHANGE UP (ref 3.8–10.5)

## 2024-02-08 PROCEDURE — 90792 PSYCH DIAG EVAL W/MED SRVCS: CPT

## 2024-02-08 PROCEDURE — 99232 SBSQ HOSP IP/OBS MODERATE 35: CPT

## 2024-02-08 RX ORDER — SODIUM,POTASSIUM PHOSPHATES 278-250MG
1 POWDER IN PACKET (EA) ORAL THREE TIMES A DAY
Refills: 0 | Status: COMPLETED | OUTPATIENT
Start: 2024-02-08 | End: 2024-02-09

## 2024-02-08 RX ADMIN — CARBIDOPA AND LEVODOPA 1 TABLET(S): 25; 100 TABLET ORAL at 21:42

## 2024-02-08 RX ADMIN — Medication 1 PACKET(S): at 20:09

## 2024-02-08 RX ADMIN — ENOXAPARIN SODIUM 40 MILLIGRAM(S): 100 INJECTION SUBCUTANEOUS at 21:42

## 2024-02-08 RX ADMIN — Medication 1 APPLICATION(S): at 06:10

## 2024-02-08 RX ADMIN — CARBIDOPA AND LEVODOPA 1.5 TABLET(S): 25; 100 TABLET ORAL at 21:41

## 2024-02-08 RX ADMIN — INSULIN GLARGINE 8 UNIT(S): 100 INJECTION, SOLUTION SUBCUTANEOUS at 22:47

## 2024-02-08 RX ADMIN — Medication 1 APPLICATION(S): at 17:18

## 2024-02-08 RX ADMIN — Medication 50 MILLIGRAM(S): at 06:05

## 2024-02-08 NOTE — PROVIDER CONTACT NOTE (MEDICATION) - ASSESSMENT
A&O x2, Niuean speaking. Vital signs stable. Patient was is no acute distress throughout the shift. Patient denies any complain of chest pain or SOB.

## 2024-02-08 NOTE — BH CONSULTATION LIAISON ASSESSMENT NOTE - CURRENT MEDICATION
MEDICATIONS  (STANDING):  aspirin enteric coated 81 milliGRAM(s) Oral daily  atovaquone  Suspension 1500 milliGRAM(s) Oral daily  calcium carbonate 1250 mG  + Vitamin D (OsCal 500 + D) 1 Tablet(s) Oral daily  carbidopa/levodopa  25/100 1.5 Tablet(s) Oral three times a day  carbidopa/levodopa CR 25/100 1 Tablet(s) Oral at bedtime  dextrose 5%. 1000 milliLiter(s) (50 mL/Hr) IV Continuous <Continuous>  dextrose 50% Injectable 25 Gram(s) IV Push once  dextrose 50% Injectable 12.5 Gram(s) IV Push once  enoxaparin Injectable 40 milliGRAM(s) SubCutaneous every 24 hours  fenofibrate Tablet 145 milliGRAM(s) Oral daily  ferrous    sulfate Liquid 220 milliGRAM(s) Oral daily  folic acid 1 milliGRAM(s) Oral daily  glucagon  Injectable 1 milliGRAM(s) IntraMuscular once  insulin glargine Injectable (LANTUS) 8 Unit(s) SubCutaneous at bedtime  insulin lispro (ADMELOG) corrective regimen sliding scale   SubCutaneous three times a day before meals  metoprolol succinate ER 50 milliGRAM(s) Oral daily  mirtazapine 7.5 milliGRAM(s) Oral daily  pantoprazole    Tablet 40 milliGRAM(s) Oral before breakfast  polyethylene glycol 3350 17 Gram(s) Oral daily  predniSONE   Tablet 50 milliGRAM(s) Oral daily  senna 2 Tablet(s) Oral at bedtime  silver sulfADIAZINE 1% Cream 1 Application(s) Topical two times a day    MEDICATIONS  (PRN):  acetaminophen     Tablet .. 650 milliGRAM(s) Oral every 6 hours PRN Temp greater or equal to 38C (100.4F), Mild Pain (1 - 3)  dextrose Oral Gel 15 Gram(s) Oral once PRN Blood Glucose LESS THAN 70 milliGRAM(s)/deciliter

## 2024-02-08 NOTE — BH CONSULTATION LIAISON ASSESSMENT NOTE - NSBHATTESTCOMMENTATTENDFT_PSY_A_CORE
Met with the patient this afternoon. Tired appearing, wants to cover with sheet rather than talk. Calm, does not want to use . Closes eyes, and indicates with hands to 'go away'. Allowed exam-- mild tremors at rest in UE, mild cogwheeling right UE>left UE    Agree with plan as above

## 2024-02-08 NOTE — BH CONSULTATION LIAISON ASSESSMENT NOTE - RISK ASSESSMENT
Risk factors include recent onset vision loss.  Protective factors include familial support, support at [employer], strong Christianity beliefs, lack of SI, lack of psychiatric history, and lack of access to lethal means. Risk factors include recent onset vision loss, increased dependence on others, loss of autonomy  Denies any mood symptoms, denies any si or hi, no psychosis, no behavioral issues.    Protective factors include familial support, they have no concerns for her safety, support at [employer], strong Synagogue beliefs, lack of SI, lack of psychiatric history, and lack of access to lethal means.

## 2024-02-08 NOTE — BH CONSULTATION LIAISON ASSESSMENT NOTE - NSSUICPROTFACT_PSY_ALL_CORE
Responsibility to children, family, or others/Identifies reasons for living/Supportive social network of family or friends/Cultural, spiritual and/or moral attitudes against suicide/Alevism beliefs Identifies reasons for living/Supportive social network of family or friends/Cultural, spiritual and/or moral attitudes against suicide/Congregational beliefs

## 2024-02-08 NOTE — BH CONSULTATION LIAISON ASSESSMENT NOTE - SUMMARY
Patient is an 79F German-speaking, single, domiciled in a home, with no past psychiatric history or past psychiatric hospitalizations, PMH of DM2, HTN, HLD, Parkinsons, and GCA with recent-onset blindness, presenting to [facility] after a fall on 1/30. Admitted with AMS and electrolyte disturbances. Psych consulted per request of children. According to collateral provided by her daughter, patient was well until a few months ago when she developed GCA and progressively worsening vision loss. She was moved to a nursing home, where her daughter notes the staff did not make efforts to engage with her, as she is German-speaking, or feed her, as she has vision loss and cannot feed herself. Patient has since been moved home, where she has an aid with her 10hrs/day and patient's other daughter spends nights with her. Daughter denies any history of depression, but notes her mom has always been anxious. She has not received psychiatric or mental health treatment in the past, though her daughter notes she would benefit from "talking to someone." She notes a strong support system at [employer]. Currently, daughter is concerned about her mother's lack of eating and attributes this to her mother being served pureed foods. Patient noted mood to be "good." Denies SI/HI, no auditory or visual hallucinations, no delusions. Differential diagnosis includes adjustment disorder with depressed mood, MDD.    Recommendations:  -No 1:1 CO needed at this time, given absence of SI/HI and low risk of violence  -Consult social work for out-patient therapy (preferably Belgian-speaking) Patient is an 79F German-speaking, single, domiciled in a home, with no past psychiatric history or past psychiatric hospitalizations, PMH of DM2, HTN, HLD, Parkinsons, and GCA with recent-onset blindness, presenting to [facility] after a fall on 1/30. Admitted with AMS and electrolyte disturbances. Psych consulted per request of children. According to collateral provided by her daughter, patient was well until a few months ago when she developed GCA and progressively worsening vision loss. She was moved to a nursing home, where her daughter notes the staff did not make efforts to engage with her, as she is German-speaking, or feed her, as she has vision loss and cannot feed herself. Patient has since been moved home, where she has an aid with her 10hrs/day and patient's other daughter spends nights with her. Daughter denies any history of depression, but notes her mom has always been anxious. She has not received psychiatric or mental health treatment in the past, though her daughter notes she would benefit from "talking to someone." She notes a strong support system at [employer]. Currently, daughter is concerned about her mother's lack of eating and attributes this to her mother being served pureed foods. Patient noted mood to be "good." Denies SI/HI, no auditory or visual hallucinations, no delusions. Differential diagnosis includes adjustment disorder with depressed mood. At this time, patient denies any symptoms suggestive of MDD.    Recommendations:  -Use  when communicating with patient  -No 1:1 CO needed at this time, given absence of SI/HI and low risk of violence  -At this time, patient and daughter deny any mood symptoms  -Continue with mirtazapine 7.5mg PO qd at bedtime  -If patient experiences mood symptoms, can increase mirtazapine to 15mg PO qd at bedtime  -If family wants to establish outpatient care, can follow with outpatient geriatric psychiatry at [phone] x2 Patient is an 79F Mauritanian-speaking, single, domiciled in a home, with no past psychiatric history or past psychiatric hospitalizations, PMH of DM2, HTN, HLD, Parkinsons, and GCA with recent-onset blindness, presenting to [facility] after a fall on 1/30. Admitted with AMS and electrolyte disturbances. Psych consulted per request of children.     According to collateral provided by her daughter, patient was well until a few months ago when she developed GCA and progressively worsening vision loss. She was moved to a nursing home, where her daughter notes the staff did not make efforts to engage with her, as she is Mauritanian-speaking, or feed her, as she has vision loss and cannot feed herself. Patient has since been moved home, where she has an aid with her 10hrs/day and patient's other daughter spends nights with her. Daughter denies any history of depression, but notes her mom has always been anxious. She has not received psychiatric or mental health treatment in the past, though her daughter notes she would benefit from "talking to someone." She notes a strong support system at [employer]. Currently, daughter is concerned about her mother's lack of eating and attributes this to her mother being served pureed foods. Patient noted mood to be "good." Denies SI/HI, no auditory or visual hallucinations, no delusions. Differential diagnosis includes adjustment disorder with depressed mood. At this time, patient denies any symptoms suggestive of MDD.    Recommendations:  -Use  when communicating with patient  -No 1:1 CO needed at this time, given absence of SI/HI and low risk of violence  -At this time, patient and daughter deny any mood symptoms  -Continue with mirtazapine 7.5mg PO qd at bedtime (If patient experiences mood symptoms, medicine team can increase mirtazapine to 15mg PO at bedtime)  -If daughter wants patient to establish with outpatient psychiatric care, daughter can call outpatient geriatric psychiatry at [phone] x ext- 2

## 2024-02-08 NOTE — PROGRESS NOTE ADULT - SUBJECTIVE AND OBJECTIVE BOX
Dr. Tiana Roy  Pager [phone]    PROGRESS NOTE:     Patient is a 79y old  Female who presents with a chief complaint of vision loss (07 Feb 2024 16:06)      SUBJECTIVE / OVERNIGHT EVENTS: denies chest pain or sob   ADDITIONAL REVIEW OF SYSTEMS: afebrile , vision loss persist     MEDICATIONS  (STANDING):  aspirin enteric coated 81 milliGRAM(s) Oral daily  atovaquone  Suspension 1500 milliGRAM(s) Oral daily  calcium carbonate 1250 mG  + Vitamin D (OsCal 500 + D) 1 Tablet(s) Oral daily  carbidopa/levodopa  25/100 1.5 Tablet(s) Oral three times a day  carbidopa/levodopa CR 25/100 1 Tablet(s) Oral at bedtime  dextrose 5%. 1000 milliLiter(s) (50 mL/Hr) IV Continuous <Continuous>  dextrose 50% Injectable 25 Gram(s) IV Push once  dextrose 50% Injectable 12.5 Gram(s) IV Push once  enoxaparin Injectable 40 milliGRAM(s) SubCutaneous every 24 hours  fenofibrate Tablet 145 milliGRAM(s) Oral daily  ferrous    sulfate Liquid 220 milliGRAM(s) Oral daily  folic acid 1 milliGRAM(s) Oral daily  glucagon  Injectable 1 milliGRAM(s) IntraMuscular once  insulin glargine Injectable (LANTUS) 8 Unit(s) SubCutaneous at bedtime  insulin lispro (ADMELOG) corrective regimen sliding scale   SubCutaneous three times a day before meals  metoprolol succinate ER 50 milliGRAM(s) Oral daily  mirtazapine 7.5 milliGRAM(s) Oral daily  pantoprazole    Tablet 40 milliGRAM(s) Oral before breakfast  polyethylene glycol 3350 17 Gram(s) Oral daily  predniSONE   Tablet 50 milliGRAM(s) Oral daily  senna 2 Tablet(s) Oral at bedtime  silver sulfADIAZINE 1% Cream 1 Application(s) Topical two times a day    MEDICATIONS  (PRN):  acetaminophen     Tablet .. 650 milliGRAM(s) Oral every 6 hours PRN Temp greater or equal to 38C (100.4F), Mild Pain (1 - 3)  dextrose Oral Gel 15 Gram(s) Oral once PRN Blood Glucose LESS THAN 70 milliGRAM(s)/deciliter      CAPILLARY BLOOD GLUCOSE      POCT Blood Glucose.: 87 mg/dL (08 Feb 2024 08:29)  POCT Blood Glucose.: 110 mg/dL (07 Feb 2024 22:31)  POCT Blood Glucose.: 129 mg/dL (07 Feb 2024 17:14)    I&O's Summary    07 Feb 2024 07:01  -  08 Feb 2024 07:00  --------------------------------------------------------  IN: 0 mL / OUT: 600 mL / NET: -600 mL        PHYSICAL EXAM:  Vital Signs Last 24 Hrs  T(C): 36.2 (08 Feb 2024 05:44), Max: 36.7 (07 Feb 2024 13:00)  T(F): 97.2 (08 Feb 2024 05:44), Max: 98 (07 Feb 2024 13:00)  HR: 70 (08 Feb 2024 05:44) (70 - 79)  BP: 139/64 (08 Feb 2024 05:44) (116/56 - 139/64)  BP(mean): --  RR: 18 (08 Feb 2024 05:44) (18 - 19)  SpO2: 98% (08 Feb 2024 05:44) (98% - 99%)    Parameters below as of 08 Feb 2024 05:44  Patient On (Oxygen Delivery Method): room air      CONSTITUTIONAL: frail elderly woman, legally blind   heent: no vision, no light perception   RESPIRATORY: Normal respiratory effort; lungs are clear to auscultation bilaterally  CARDIOVASCULAR: Regular rate and rhythm, normal S1 and S2, no murmur/rub/gallop; No lower extremity edema; Peripheral pulses are 2+ bilaterally  ABDOMEN: Nontender to palpation, normoactive bowel sounds, no rebound/guarding; No hepatosplenomegaly  MUSCULOSKELETAL: no clubbing or cyanosis of digits; no joint swelling or tenderness to palpation  PSYCH: A+Ox2 to person, place; affect appropriate    LABS:                        9.9    5.25  )-----------( 150      ( 08 Feb 2024 06:00 )             30.0     02-08    139  |  106  |  16  ----------------------------<  82  3.8   |  24  |  0.33<L>    Ca    8.3<L>      08 Feb 2024 06:00  Phos  2.2     02-08  Mg     1.70     02-08            Urinalysis Basic - ( 08 Feb 2024 06:00 )    Color: x / Appearance: x / SG: x / pH: x  Gluc: 82 mg/dL / Ketone: x  / Bili: x / Urobili: x   Blood: x / Protein: x / Nitrite: x   Leuk Esterase: x / RBC: x / WBC x   Sq Epi: x / Non Sq Epi: x / Bacteria: x          RADIOLOGY & ADDITIONAL TESTS:  Results Reviewed:   Imaging Personally Reviewed:  Electrocardiogram Personally Reviewed:    COORDINATION OF CARE:  Care Discussed with Consultants/Other Providers [Y/N]:  Prior or Outpatient Records Reviewed [Y/N]:

## 2024-02-08 NOTE — BH CONSULTATION LIAISON ASSESSMENT NOTE - DESCRIPTION
According to collateral provided by her daughter, patient was well until a few months ago when she developed GCA and progressively worsening vision loss. She was moved to a nursing home, where her daughter notes the staff did not make efforts to engage with her, as she is Mohawk-speaking, or feed her, as she has vision loss and cannot feed herself. Patient has since been moved home, where she has an aid with her 10hrs/day and patient's other daughter spends nights with her. Daughter denies any history of depression, but notes her mom has always been anxious. She has not received psychiatric or mental health treatment in the past, though her daughter notes she would benefit from "talking to someone." She notes a strong support system at [employer]. Currently, daughter is concerned about her mother's lack of eating and attributes this to her mother being served pureed foods.

## 2024-02-08 NOTE — BH CONSULTATION LIAISON ASSESSMENT NOTE - NSBHCHARTREVIEWVS_PSY_A_CORE FT
Vital Signs Last 24 Hrs  T(C): 36.2 (08 Feb 2024 05:44), Max: 36.7 (07 Feb 2024 13:00)  T(F): 97.2 (08 Feb 2024 05:44), Max: 98 (07 Feb 2024 13:00)  HR: 70 (08 Feb 2024 05:44) (70 - 79)  BP: 139/64 (08 Feb 2024 05:44) (116/56 - 139/64)  BP(mean): --  RR: 18 (08 Feb 2024 05:44) (18 - 19)  SpO2: 98% (08 Feb 2024 05:44) (98% - 99%)    Parameters below as of 08 Feb 2024 05:44  Patient On (Oxygen Delivery Method): room air

## 2024-02-08 NOTE — PROVIDER CONTACT NOTE (MEDICATION) - BACKGROUND
79 F presented from [facility] for AMS & fall. PMHx: Diabetes, Parkinsons, HTN, HLD.

## 2024-02-08 NOTE — PROGRESS NOTE ADULT - ASSESSMENT
78 yo F PMH IDDM, HTN, HLD, OP, Parkinson's disease, GCA (recently diagnosed) BIBEMS from [facility] s/p Fall and AMS. found to have toxic/metabolic derangement with hypernatremia and acute cystitis, and additionally with cf GCA flare with worsening vision.

## 2024-02-08 NOTE — BH CONSULTATION LIAISON ASSESSMENT NOTE - HPI (INCLUDE ILLNESS QUALITY, SEVERITY, DURATION, TIMING, CONTEXT, MODIFYING FACTORS, ASSOCIATED SIGNS AND SYMPTOMS)
Patient is an 79F Korean-speaking, single, domiciled in a home, with no past psychiatric history or past psychiatric hospitalizations, PMH of DM2, HTN, HLD, Parkinsons, and GCA with recent-onset blindness, presenting to [facility] after a fall on 1/30. Admitted with AMS and electrolyte disturbances. Psych consulted per request of children.    Patient noted mood to be "good." Denies SI/HI, no auditory or visual hallucinations, no delusions.     According to collateral provided by her daughter, patient was well until a few months ago when she developed GCA and progressively worsening vision loss. She was moved to a nursing home, where her daughter notes the staff did not make efforts to engage with her, as she is Korean-speaking, or feed her, as she has vision loss and cannot feed herself. Patient has since been moved home, where she has an aid with her 10hrs/day and patient's other daughter spends nights with her. Daughter denies any history of depression, but notes her mom has always been anxious. She has not received psychiatric or mental health treatment in the past, though her daughter notes she would benefit from "talking to someone." She notes a strong support system at [employer]. Currently, daughter is concerned about her mother's lack of eating and attributes this to her mother being served pureed foods.  Patient is an 79F Persian-speaking, single, domiciled in a home, with no past psychiatric history or past psychiatric hospitalizations, PMH of DM2, HTN, HLD, Parkinsons, and GCA with recent-onset blindness, presenting to [facility] after a fall on 1/30. Admitted with AMS and electrolyte disturbances. Psych consulted per request of children.    Patient noted mood to be "good." Denies SI/HI, no auditory or visual hallucinations, no delusions. Grossly oriented.    According to collateral provided by her daughter, patient was well until a few months ago when she developed GCA and progressively worsening vision loss. She was moved to a nursing home, where her daughter notes the staff did not make efforts to engage with her, as she is Persian-speaking, or feed her, as she has vision loss and cannot feed herself. Patient has since been moved home, where she has an aid with her 10hrs/day and patient's other daughter spends nights with her. Daughter denies any history of depression, but notes her mom has always been anxious. She has not received psychiatric or mental health treatment in the past, though her daughter notes she would benefit from "talking to someone." She notes a strong support system at [employer]. Currently, daughter is concerned about her mother's lack of eating and attributes this to her mother being served pureed foods. No concerns for safety at home.

## 2024-02-09 ENCOUNTER — TRANSCRIPTION ENCOUNTER (OUTPATIENT)
Age: 79
End: 2024-02-09

## 2024-02-09 ENCOUNTER — NON-APPOINTMENT (OUTPATIENT)
Age: 79
End: 2024-02-09

## 2024-02-09 LAB
GLUCOSE BLDC GLUCOMTR-MCNC: 182 MG/DL — HIGH (ref 70–99)
GLUCOSE BLDC GLUCOMTR-MCNC: 185 MG/DL — HIGH (ref 70–99)
GLUCOSE BLDC GLUCOMTR-MCNC: 190 MG/DL — HIGH (ref 70–99)
GLUCOSE BLDC GLUCOMTR-MCNC: 224 MG/DL — HIGH (ref 70–99)

## 2024-02-09 PROCEDURE — 99232 SBSQ HOSP IP/OBS MODERATE 35: CPT

## 2024-02-09 RX ORDER — INSULIN GLARGINE 100 [IU]/ML
5 INJECTION, SOLUTION SUBCUTANEOUS AT BEDTIME
Refills: 0 | Status: DISCONTINUED | OUTPATIENT
Start: 2024-02-09 | End: 2024-02-14

## 2024-02-09 RX ORDER — DEXTROSE 50 % IN WATER 50 %
15 SYRINGE (ML) INTRAVENOUS ONCE
Refills: 0 | Status: COMPLETED | OUTPATIENT
Start: 2024-02-09 | End: 2024-02-09

## 2024-02-09 RX ADMIN — Medication 1 APPLICATION(S): at 18:17

## 2024-02-09 RX ADMIN — Medication 15 GRAM(S): at 09:48

## 2024-02-09 RX ADMIN — INSULIN GLARGINE 5 UNIT(S): 100 INJECTION, SOLUTION SUBCUTANEOUS at 22:29

## 2024-02-09 RX ADMIN — PANTOPRAZOLE SODIUM 40 MILLIGRAM(S): 20 TABLET, DELAYED RELEASE ORAL at 09:47

## 2024-02-09 RX ADMIN — Medication 2: at 18:17

## 2024-02-09 NOTE — PROGRESS NOTE ADULT - PROBLEM SELECTOR PLAN 10
IDDM2  Home regimen: glargine 15u qHS, admelog 7u TID qAC  Cont lantus 8 qhs and iss  Monitor FSG, maintain FITZ, hypoglycemia protocol IDDM2  Home regimen: glargine 15u qHS, admelog 7u TID qAC though daughters now say she only takes metformin?  Cont lantus 5U qhs and iss  Monitor FSG, maintain FITZ, hypoglycemia protocol Confirmed with family that patient does NOT take insulin at home, only oral meds.  Cont lantus 5U qhs and iss  Monitor FSG, maintain FITZ, hypoglycemia protocol  Metformin on dc.

## 2024-02-09 NOTE — PROGRESS NOTE ADULT - ASSESSMENT
80 yo F PMH IDDM, HTN, HLD, OP, Parkinson's disease, GCA (recently diagnosed) BIBEMS from [facility] s/p Fall and AMS. found to have toxic/metabolic derangement with hypernatremia and acute cystitis, and additionally with cf GCA flare with worsening vision.

## 2024-02-09 NOTE — DISCHARGE NOTE NURSING/CASE MANAGEMENT/SOCIAL WORK - NSDCCRTYPESERV_GEN_ALL_CORE_FT
HA 10 hrs x 7 days confirmed for SAT 2/10/2024 HA 10 hrs x 7 days confirmed for Wednseday 2/14 in patient's home

## 2024-02-09 NOTE — DISCHARGE NOTE NURSING/CASE MANAGEMENT/SOCIAL WORK - PATIENT PORTAL LINK FT
You can access the FollowMyHealth Patient Portal offered by [facility] by registering at the following website: [email or web address]. By joining [employer]’s FollowMyHealth portal, you will also be able to view your health information using other applications (apps) compatible with our system.

## 2024-02-09 NOTE — DISCHARGE NOTE NURSING/CASE MANAGEMENT/SOCIAL WORK - NSDCFUADDAPPT_GEN_ALL_CORE_FT
Please call primary care provider within 1-2 weeks for follow up appointment.     Please follow-up with opthalmologist at [facility] in one week at:  [facility] Department of Ophthalmology  [address]  [place] [address]  [phone].  Please call for an appointment.    Follow up with Dr. Escobar rheumatology within 2 weeks, please call for an appointment.     follow-up with Neurology with any of the following: Dr. Smooth Andrade MD. [address], [place]. [phone]. Dr. Liza Mccain MD. [address], [place]. [phone].  Please call for an appointment.     If you would like to establish care with outpatient psychiatric care, daughter can call outpatient geriatric psychiatry at [phone]    [facility]   [phone]  [address] [place] [address]  Please call for an appointment within 1 week    [facility]   [phone]  Fax: [phone]  [address] [place] [address]

## 2024-02-09 NOTE — PROGRESS NOTE ADULT - SUBJECTIVE AND OBJECTIVE BOX
[facility] Division of Hospital Medicine  Jefferson DO Leonor  Available via MS Teams  In house pager [phone]    SUBJECTIVE / OVERNIGHT EVENTS:  No acute events overnight.  Discussed with daughter at bedside and other daughter over the phone while at bedside this morning.  DC planning. Family has received DME.  She had an episode of mild hypoglycemia this morning, improved. Likely related to lack of prednisone use while receiving Lantus.  Discussed with daughters - will c/w metformin after dc.  She will need to see ophtho, rheum, and [facility] psych after dc.  Strongly encouraged taking prednisone as prescribed.     ADDITIONAL REVIEW OF SYSTEMS:    MEDICATIONS  (STANDING):  aspirin enteric coated 81 milliGRAM(s) Oral daily  atovaquone  Suspension 1500 milliGRAM(s) Oral daily  calcium carbonate 1250 mG  + Vitamin D (OsCal 500 + D) 1 Tablet(s) Oral daily  carbidopa/levodopa  25/100 1.5 Tablet(s) Oral three times a day  carbidopa/levodopa CR 25/100 1 Tablet(s) Oral at bedtime  dextrose 5%. 1000 milliLiter(s) (50 mL/Hr) IV Continuous <Continuous>  dextrose 50% Injectable 25 Gram(s) IV Push once  dextrose 50% Injectable 12.5 Gram(s) IV Push once  enoxaparin Injectable 40 milliGRAM(s) SubCutaneous every 24 hours  fenofibrate Tablet 145 milliGRAM(s) Oral daily  ferrous    sulfate Liquid 220 milliGRAM(s) Oral daily  folic acid 1 milliGRAM(s) Oral daily  glucagon  Injectable 1 milliGRAM(s) IntraMuscular once  insulin glargine Injectable (LANTUS) 5 Unit(s) SubCutaneous at bedtime  insulin lispro (ADMELOG) corrective regimen sliding scale   SubCutaneous three times a day before meals  metoprolol succinate ER 50 milliGRAM(s) Oral daily  mirtazapine 7.5 milliGRAM(s) Oral daily  pantoprazole    Tablet 40 milliGRAM(s) Oral before breakfast  polyethylene glycol 3350 17 Gram(s) Oral daily  predniSONE   Tablet 50 milliGRAM(s) Oral daily  senna 2 Tablet(s) Oral at bedtime  silver sulfADIAZINE 1% Cream 1 Application(s) Topical two times a day    MEDICATIONS  (PRN):  acetaminophen     Tablet .. 650 milliGRAM(s) Oral every 6 hours PRN Temp greater or equal to 38C (100.4F), Mild Pain (1 - 3)  dextrose Oral Gel 15 Gram(s) Oral once PRN Blood Glucose LESS THAN 70 milliGRAM(s)/deciliter      I&O's Summary    08 Feb 2024 07:01  -  09 Feb 2024 07:00  --------------------------------------------------------  IN: 780 mL / OUT: 780 mL / NET: 0 mL        PHYSICAL EXAM:  Vital Signs Last 24 Hrs  T(C): 36.4 (09 Feb 2024 12:30), Max: 36.4 (09 Feb 2024 12:30)  T(F): 97.6 (09 Feb 2024 12:30), Max: 97.6 (09 Feb 2024 12:30)  HR: 91 (09 Feb 2024 12:30) (79 - 91)  BP: 104/47 (09 Feb 2024 12:30) (104/47 - 113/60)  BP(mean): --  RR: 18 (09 Feb 2024 12:30) (18 - 18)  SpO2: 100% (09 Feb 2024 12:30) (100% - 100%)    Parameters below as of 09 Feb 2024 12:30  Patient On (Oxygen Delivery Method): room air      CONSTITUTIONAL: NAD, well-groomed  EYES: PERRLA; conjunctiva and sclera clear  ENMT: Moist oral mucosa, no pharyngeal injection or exudates; normal dentition  NECK: Supple, no palpable masses; no thyromegaly  RESPIRATORY: Normal respiratory effort; lungs are clear to auscultation bilaterally  CARDIOVASCULAR: Regular rate and rhythm, normal S1 and S2, no murmur/rub/gallop; No lower extremity edema; Peripheral pulses are 2+ bilaterally  ABDOMEN: Nontender to palpation, normoactive bowel sounds, no rebound/guarding; No hepatosplenomegaly  MUSCULOSKELETAL: no clubbing or cyanosis of digits; no joint swelling or tenderness to palpation  PSYCH: A+O to person, place, and time; affect appropriate  NEUROLOGY: CN 2-12 are intact and symmetric; no gross sensory deficits   SKIN: No rashes; no palpable lesions    LABS:                        9.9    5.25  )-----------( 150      ( 08 Feb 2024 06:00 )             30.0     02-08    139  |  106  |  16  ----------------------------<  82  3.8   |  24  |  0.33<L>    Ca    8.3<L>      08 Feb 2024 06:00  Phos  2.2     02-08  Mg     1.70     02-08            Urinalysis Basic - ( 08 Feb 2024 06:00 )    Color: x / Appearance: x / SG: x / pH: x  Gluc: 82 mg/dL / Ketone: x  / Bili: x / Urobili: x   Blood: x / Protein: x / Nitrite: x   Leuk Esterase: x / RBC: x / WBC x   Sq Epi: x / Non Sq Epi: x / Bacteria: x        SARS-CoV-2: NotDetec (05 Feb 2024 14:26)

## 2024-02-09 NOTE — PROGRESS NOTE ADULT - NSPROGADDITIONALINFOA_GEN_ALL_CORE
Dc to home today now that DME is delivered. Family declined rehab.   Patient and family aware of follow up with ophtho, rheum, [facility] psych, PMD, neurology.  Will need to check f/s routinely while on steroids, taper to be determined by rheum. Dc was planned to home today now that DME is delivered. Family declined rehab.   Patient and family aware of follow up with ophtho, rheum, [facility] psych, PMD, neurology.  Will need to check f/s routinely while on steroids, taper to be determined by rheum.    Now complicated by patient's refusal to take po meds, particularly prednisone. Her daughter has tried countless times to encourage it without success.  Patient does not demonstrate the capacity to refuse these medications, as she does not have insight into why she must take them.  Will have SLP re-evaluate the patient and consider escalation of consistency.  It may be possible to introduce meds in alternate forms such as solution.

## 2024-02-09 NOTE — PROGRESS NOTE ADULT - PROBLEM SELECTOR PLAN 2
Patient is A&Ox0 on admission, currently improved to ANO2-3. AMS likely 2/2 toxic metabolic derangements as below, but per neuro less likely neurovascular etiology. Also with known parkinson's disease for the past 8 months.  Per daughter at baseline patient is A&Ox3 and conversant but has been deteriorating since mid-jan.     CTH, CTA H/N with "No acute intracranial hemorrhage, mass, or large vessel occlusion. No aneurysm. prominent lateral and third ventricles; severely diminutive veribrobasilar system with small areas of minimal filling in   the basilar artery. Prominent b/l PCOMs"  MRI/MRA brain/orbit: Unremarkable MR of the orbits. No acute infarct.   intracranial atherosclerosis cavernous and clinoid segments of the internal carotid arteries, at least mild-to-moderate on the right, more mild on the left  per neuro: c/w ASA 81mg, atorvastatin 40mg hs  optho following, will reexamine pt tomorrow   Management for electrolyte abnormalities as below and treat UTI as below  S/S eval- pt refused to participate with SLP. pt is alert but refuses to eat, discussed with patient and daughter, who wants to try pureed diet, Understands aspiration risk, wants to hold off ng tube   Neuro checks    Outpt [facility] psych follow up.

## 2024-02-09 NOTE — PROGRESS NOTE ADULT - TIME BILLING
I was present with the Resident/ACP during the key portions of the history and exam. I discussed the case with the Resident/ACP and agree with the findings and plan as documented in the Resident's/ACP's note, unless noted below.   ROS otherwise negative
Time-based billing (NON-critical care).     35 minutes spent on total encounter; more than 50% of the visit was spent counseling and / or coordinating care by the attending physician.  The necessity of the time spent during the encounter on this date of service was due to:     review of laboratory data, radiology results, consultants' recommendations, documentation in [place], discussion with patient/daughters

## 2024-02-10 LAB
GLUCOSE BLDC GLUCOMTR-MCNC: 106 MG/DL — HIGH (ref 70–99)
GLUCOSE BLDC GLUCOMTR-MCNC: 108 MG/DL — HIGH (ref 70–99)
GLUCOSE BLDC GLUCOMTR-MCNC: 71 MG/DL — SIGNIFICANT CHANGE UP (ref 70–99)
GLUCOSE BLDC GLUCOMTR-MCNC: 76 MG/DL — SIGNIFICANT CHANGE UP (ref 70–99)

## 2024-02-10 PROCEDURE — 99232 SBSQ HOSP IP/OBS MODERATE 35: CPT

## 2024-02-10 RX ORDER — OLANZAPINE 15 MG/1
1.25 TABLET, FILM COATED ORAL ONCE
Refills: 0 | Status: DISCONTINUED | OUTPATIENT
Start: 2024-02-10 | End: 2024-02-10

## 2024-02-10 RX ORDER — OLANZAPINE 15 MG/1
1.25 TABLET, FILM COATED ORAL ONCE
Refills: 0 | Status: COMPLETED | OUTPATIENT
Start: 2024-02-10 | End: 2024-02-10

## 2024-02-10 RX ADMIN — Medication 81 MILLIGRAM(S): at 13:11

## 2024-02-10 RX ADMIN — Medication 220 MILLIGRAM(S): at 13:11

## 2024-02-10 RX ADMIN — ATOVAQUONE 1500 MILLIGRAM(S): 750 SUSPENSION ORAL at 13:12

## 2024-02-10 RX ADMIN — Medication 1 TABLET(S): at 13:11

## 2024-02-10 RX ADMIN — OLANZAPINE 1.25 MILLIGRAM(S): 15 TABLET, FILM COATED ORAL at 22:08

## 2024-02-10 RX ADMIN — Medication 145 MILLIGRAM(S): at 13:11

## 2024-02-10 RX ADMIN — Medication 1 APPLICATION(S): at 15:00

## 2024-02-10 RX ADMIN — MIRTAZAPINE 7.5 MILLIGRAM(S): 45 TABLET, ORALLY DISINTEGRATING ORAL at 13:11

## 2024-02-10 RX ADMIN — Medication 1 MILLIGRAM(S): at 13:11

## 2024-02-10 RX ADMIN — INSULIN GLARGINE 5 UNIT(S): 100 INJECTION, SOLUTION SUBCUTANEOUS at 22:13

## 2024-02-10 RX ADMIN — CARBIDOPA AND LEVODOPA 1.5 TABLET(S): 25; 100 TABLET ORAL at 14:15

## 2024-02-10 RX ADMIN — Medication 1 APPLICATION(S): at 05:15

## 2024-02-10 RX ADMIN — POLYETHYLENE GLYCOL 3350 17 GRAM(S): 17 POWDER, FOR SOLUTION ORAL at 14:19

## 2024-02-10 NOTE — SWALLOW BEDSIDE ASSESSMENT ADULT - SWALLOW EVAL: DIAGNOSIS
Oral-pharyngeal stages unable to be assess given refusal of trials at bedside
This was a limited assessment given patient accepted only 1x 1/2 tspn of puree and <1oz of thin liquids. Despite multiple attempts and clinician/daughter encouragement, patient declined further trials. Per daughter, patient with poor appetite leading up to admission. Patient declined further PO trials. Therefore, unable to adequately assess oral and pharyngeal phases of the swallow.
Unable to assess oral pharyngeal stages given limited acceptance of PO. Patient accepted 1 tsp trial of applesauce then stated "I want to lay down" and declined additional trials.

## 2024-02-10 NOTE — SWALLOW BEDSIDE ASSESSMENT ADULT - SWALLOW EVAL: RECOMMENDED DIET
Defer diet to MD
[phone]
1. Defer oral diet program to MD given limited acceptance of PO
1. Defer diet to MD given limited assessment

## 2024-02-10 NOTE — SWALLOW BEDSIDE ASSESSMENT ADULT - ASR SWALLOW RECOMMEND DIAG
Objective testing is NOT indicated given patient declined participation in bedside trials
Instrumental study not indicated given refusal of trials at bedside
Objective testing Not warranted at this time given limited PO acceptance

## 2024-02-10 NOTE — PROGRESS NOTE ADULT - ASSESSMENT
80 yo F PMH IDDM, HTN, HLD, OP, Parkinson's disease, GCA (recently diagnosed) BIBEMS from [facility] s/p Fall and AMS. found to have toxic/metabolic derangement with hypernatremia and acute cystitis, and additionally with cf GCA flare with worsening vision.

## 2024-02-10 NOTE — SWALLOW BEDSIDE ASSESSMENT ADULT - NS ASR SWALLOW FINDINGS DISCUS
PA notified via teams/Nursing
[facility] [id number]
ACP Emelina/Nursing/Patient/Family

## 2024-02-10 NOTE — SWALLOW BEDSIDE ASSESSMENT ADULT - SWALLOW EVAL: ORAL MUSCULATURE
unable to assess given limited participation
generally intact
unable to assess due to poor participation/comprehension

## 2024-02-10 NOTE — PROGRESS NOTE ADULT - PROBLEM SELECTOR PLAN 10
Confirmed with family that patient does NOT take insulin at home, only oral meds.  Cont lantus 5U qhs and iss  Monitor FSG, maintain FITZ, hypoglycemia protocol  Metformin on dc.

## 2024-02-10 NOTE — CHART NOTE - NSCHARTNOTEFT_GEN_A_CORE
Spoke with patient's daughter, Bina ([phone]) regarding patient continuing to refuse medications and care given AMS. Daughter in agreement with plan to give Zyprexa to allow us to obtain IV and labs, start IVF, and give Solumedrol. Zyprexa 1.25 mg IM ordered. Will reassess need for further medication to obtain medical care. Spoke with patient's daughter, Bina ([phone]) regarding patient continuing to refuse medications and care given AMS. Daughter in agreement with plan to give Zyprexa to allow us to obtain IV and labs, start IVF, and give Solumedrol. Zyprexa 1.25 mg IM ordered. Will reassess need for further medication to obtain medical care.    ---- ADDENDUM 00:00 ----     IV access obtained. Labs sent. Solumedrol 50 mg IV given. Started on D5W + NS at 50 cc/hr.

## 2024-02-10 NOTE — SWALLOW BEDSIDE ASSESSMENT ADULT - COMMENTS
Pt. lying in bed in NAD upon SLP arrival. Pt. responsive to verbal stimuli, refused to speak with .     Per chart:   "78 yo F PMH IDDM, HTN, HLD, OP, Parkinson's disease, GCA (recently diagnosed) BIBEMS from [facility] s/p Fall and AMS. found to have toxic/metabolic derangement with hypernatremia and acute cystitis, and additionally with cf GCA flare with worsening vision."    -WBC WNL per lab review 2/8  -CXR: "clear lungs"    Pt. known to service from current admission (2/1) and (2/5).     Pt. adamantly refused to speak with SLP. Refused  and repeatedly stated "no more". Pt. hit food away and refused bedside assessment.

## 2024-02-10 NOTE — SWALLOW BEDSIDE ASSESSMENT ADULT - ADDITIONAL RECOMMENDATIONS
1. Medical team advised to reconsult this service as patient becomes medically optimized/ willing to participate
2. Medical team advised to reconsult this department with any change in medical status and/or as patient is increasingly willing to participate in PO trials.
1). This service to f/u as scheduling permits  2). Medical team to re-consult when pt. medically optimized to participate

## 2024-02-10 NOTE — SWALLOW BEDSIDE ASSESSMENT ADULT - CONSISTENCIES ADMINISTERED
thin liquid/pureed Colchicine Counseling:  Patient counseled regarding adverse effects including but not limited to stomach upset (nausea, vomiting, stomach pain, or diarrhea).  Patient instructed to limit alcohol consumption while taking this medication.  Colchicine may reduce blood counts especially with prolonged use.  The patient understands that monitoring of kidney function and blood counts may be required, especially at baseline. The patient verbalized understanding of the proper use and possible adverse effects of colchicine.  All of the patient's questions and concerns were addressed.

## 2024-02-10 NOTE — PROGRESS NOTE ADULT - SUBJECTIVE AND OBJECTIVE BOX
Dr. Tiana Roy  Pager [phone]    PROGRESS NOTE:     Patient is a 79y old  Female who presents with a chief complaint of vision loss (09 Feb 2024 14:34)      SUBJECTIVE / OVERNIGHT EVENTS: pt refusing meds this am, also refusing iv  ADDITIONAL REVIEW OF SYSTEMS: no specific complaints     MEDICATIONS  (STANDING):  aspirin enteric coated 81 milliGRAM(s) Oral daily  atovaquone  Suspension 1500 milliGRAM(s) Oral daily  calcium carbonate 1250 mG  + Vitamin D (OsCal 500 + D) 1 Tablet(s) Oral daily  carbidopa/levodopa  25/100 1.5 Tablet(s) Oral three times a day  carbidopa/levodopa CR 25/100 1 Tablet(s) Oral at bedtime  dextrose 5%. 1000 milliLiter(s) (50 mL/Hr) IV Continuous <Continuous>  dextrose 50% Injectable 12.5 Gram(s) IV Push once  dextrose 50% Injectable 25 Gram(s) IV Push once  enoxaparin Injectable 40 milliGRAM(s) SubCutaneous every 24 hours  fenofibrate Tablet 145 milliGRAM(s) Oral daily  ferrous    sulfate Liquid 220 milliGRAM(s) Oral daily  folic acid 1 milliGRAM(s) Oral daily  glucagon  Injectable 1 milliGRAM(s) IntraMuscular once  insulin glargine Injectable (LANTUS) 5 Unit(s) SubCutaneous at bedtime  insulin lispro (ADMELOG) corrective regimen sliding scale   SubCutaneous three times a day before meals  methylPREDNISolone sodium succinate Injectable 50 milliGRAM(s) IV Push once  metoprolol succinate ER 50 milliGRAM(s) Oral daily  mirtazapine 7.5 milliGRAM(s) Oral daily  pantoprazole    Tablet 40 milliGRAM(s) Oral before breakfast  polyethylene glycol 3350 17 Gram(s) Oral daily  predniSONE   Tablet 50 milliGRAM(s) Oral daily  senna 2 Tablet(s) Oral at bedtime  silver sulfADIAZINE 1% Cream 1 Application(s) Topical two times a day    MEDICATIONS  (PRN):  acetaminophen     Tablet .. 650 milliGRAM(s) Oral every 6 hours PRN Temp greater or equal to 38C (100.4F), Mild Pain (1 - 3)  dextrose Oral Gel 15 Gram(s) Oral once PRN Blood Glucose LESS THAN 70 milliGRAM(s)/deciliter      CAPILLARY BLOOD GLUCOSE      POCT Blood Glucose.: 76 mg/dL (10 Feb 2024 12:23)  POCT Blood Glucose.: 71 mg/dL (10 Feb 2024 10:15)  POCT Blood Glucose.: 185 mg/dL (09 Feb 2024 21:48)  POCT Blood Glucose.: 182 mg/dL (09 Feb 2024 18:06)  POCT Blood Glucose.: 190 mg/dL (09 Feb 2024 17:35)    I&O's Summary    09 Feb 2024 07:01  -  10 Feb 2024 07:00  --------------------------------------------------------  IN: 50 mL / OUT: 350 mL / NET: -300 mL        PHYSICAL EXAM:  Vital Signs Last 24 Hrs  T(C): 36.4 (10 Feb 2024 05:13), Max: 36.5 (09 Feb 2024 21:36)  T(F): 97.5 (10 Feb 2024 05:13), Max: 97.7 (09 Feb 2024 21:36)  HR: 75 (10 Feb 2024 05:13) (75 - 80)  BP: 145/74 (10 Feb 2024 05:13) (92/60 - 145/74)  BP(mean): --  RR: 18 (10 Feb 2024 05:13) (17 - 18)  SpO2: 98% (10 Feb 2024 05:13) (98% - 100%)    Parameters below as of 10 Feb 2024 05:13  Patient On (Oxygen Delivery Method): room air      CONSTITUTIONAL: frail elderly woman, legally blind   heent: no vision, no light perception   RESPIRATORY: Normal respiratory effort; lungs are clear to auscultation bilaterally  CARDIOVASCULAR: Regular rate and rhythm, normal S1 and S2, no murmur/rub/gallop; No lower extremity edema; Peripheral pulses are 2+ bilaterally  ABDOMEN: Nontender to palpation, normoactive bowel sounds, no rebound/guarding; No hepatosplenomegaly  MUSCULOSKELETAL: no clubbing or cyanosis of digits; no joint swelling or tenderness to palpation  PSYCH: A+Ox2 to person, place; affect appropriate    LABS:                      RADIOLOGY & ADDITIONAL TESTS:  Results Reviewed:   Imaging Personally Reviewed:  Electrocardiogram Personally Reviewed:    COORDINATION OF CARE:  Care Discussed with Consultants/Other Providers [Y/N]:  Prior or Outpatient Records Reviewed [Y/N]:

## 2024-02-11 LAB
ALBUMIN SERPL ELPH-MCNC: 3.2 G/DL — LOW (ref 3.3–5)
ALP SERPL-CCNC: 53 U/L — SIGNIFICANT CHANGE UP (ref 40–120)
ALT FLD-CCNC: 12 U/L — SIGNIFICANT CHANGE UP (ref 4–33)
ANION GAP SERPL CALC-SCNC: 11 MMOL/L — SIGNIFICANT CHANGE UP (ref 7–14)
AST SERPL-CCNC: 37 U/L — HIGH (ref 4–32)
BILIRUB SERPL-MCNC: 0.5 MG/DL — SIGNIFICANT CHANGE UP (ref 0.2–1.2)
BUN SERPL-MCNC: 16 MG/DL — SIGNIFICANT CHANGE UP (ref 7–23)
CALCIUM SERPL-MCNC: 8.9 MG/DL — SIGNIFICANT CHANGE UP (ref 8.4–10.5)
CHLORIDE SERPL-SCNC: 106 MMOL/L — SIGNIFICANT CHANGE UP (ref 98–107)
CO2 SERPL-SCNC: 24 MMOL/L — SIGNIFICANT CHANGE UP (ref 22–31)
CREAT SERPL-MCNC: 0.44 MG/DL — LOW (ref 0.5–1.3)
EGFR: 98 ML/MIN/1.73M2 — SIGNIFICANT CHANGE UP
GLUCOSE BLDC GLUCOMTR-MCNC: 186 MG/DL — HIGH (ref 70–99)
GLUCOSE BLDC GLUCOMTR-MCNC: 192 MG/DL — HIGH (ref 70–99)
GLUCOSE BLDC GLUCOMTR-MCNC: 206 MG/DL — HIGH (ref 70–99)
GLUCOSE BLDC GLUCOMTR-MCNC: 239 MG/DL — HIGH (ref 70–99)
GLUCOSE SERPL-MCNC: 87 MG/DL — SIGNIFICANT CHANGE UP (ref 70–99)
HCT VFR BLD CALC: 33.6 % — LOW (ref 34.5–45)
HGB BLD-MCNC: 11.1 G/DL — LOW (ref 11.5–15.5)
MAGNESIUM SERPL-MCNC: 1.7 MG/DL — SIGNIFICANT CHANGE UP (ref 1.6–2.6)
MCHC RBC-ENTMCNC: 28 PG — SIGNIFICANT CHANGE UP (ref 27–34)
MCHC RBC-ENTMCNC: 33 GM/DL — SIGNIFICANT CHANGE UP (ref 32–36)
MCV RBC AUTO: 84.8 FL — SIGNIFICANT CHANGE UP (ref 80–100)
NRBC # BLD: 0 /100 WBCS — SIGNIFICANT CHANGE UP (ref 0–0)
NRBC # FLD: 0 K/UL — SIGNIFICANT CHANGE UP (ref 0–0)
PHOSPHATE SERPL-MCNC: 2.6 MG/DL — SIGNIFICANT CHANGE UP (ref 2.5–4.5)
PLATELET # BLD AUTO: 163 K/UL — SIGNIFICANT CHANGE UP (ref 150–400)
POTASSIUM SERPL-MCNC: 4.4 MMOL/L — SIGNIFICANT CHANGE UP (ref 3.5–5.3)
POTASSIUM SERPL-SCNC: 4.4 MMOL/L — SIGNIFICANT CHANGE UP (ref 3.5–5.3)
PROT SERPL-MCNC: 5.3 G/DL — LOW (ref 6–8.3)
RBC # BLD: 3.96 M/UL — SIGNIFICANT CHANGE UP (ref 3.8–5.2)
RBC # FLD: 22.5 % — HIGH (ref 10.3–14.5)
SODIUM SERPL-SCNC: 141 MMOL/L — SIGNIFICANT CHANGE UP (ref 135–145)
WBC # BLD: 5.49 K/UL — SIGNIFICANT CHANGE UP (ref 3.8–10.5)
WBC # FLD AUTO: 5.49 K/UL — SIGNIFICANT CHANGE UP (ref 3.8–10.5)

## 2024-02-11 PROCEDURE — 99232 SBSQ HOSP IP/OBS MODERATE 35: CPT

## 2024-02-11 RX ORDER — SODIUM CHLORIDE 9 MG/ML
1000 INJECTION, SOLUTION INTRAVENOUS
Refills: 0 | Status: DISCONTINUED | OUTPATIENT
Start: 2024-02-11 | End: 2024-02-12

## 2024-02-11 RX ADMIN — Medication 50 MILLIGRAM(S): at 01:29

## 2024-02-11 RX ADMIN — Medication 1 APPLICATION(S): at 06:45

## 2024-02-11 RX ADMIN — Medication 1 APPLICATION(S): at 19:35

## 2024-02-11 RX ADMIN — INSULIN GLARGINE 5 UNIT(S): 100 INJECTION, SOLUTION SUBCUTANEOUS at 22:07

## 2024-02-11 RX ADMIN — Medication 4: at 18:08

## 2024-02-11 RX ADMIN — POLYETHYLENE GLYCOL 3350 17 GRAM(S): 17 POWDER, FOR SOLUTION ORAL at 19:34

## 2024-02-11 RX ADMIN — Medication 220 MILLIGRAM(S): at 19:33

## 2024-02-11 RX ADMIN — SODIUM CHLORIDE 50 MILLILITER(S): 9 INJECTION, SOLUTION INTRAVENOUS at 18:35

## 2024-02-11 RX ADMIN — Medication 145 MILLIGRAM(S): at 19:32

## 2024-02-11 RX ADMIN — CARBIDOPA AND LEVODOPA 1.5 TABLET(S): 25; 100 TABLET ORAL at 19:30

## 2024-02-11 RX ADMIN — Medication 4: at 09:00

## 2024-02-11 RX ADMIN — Medication 2: at 12:32

## 2024-02-11 RX ADMIN — MIRTAZAPINE 7.5 MILLIGRAM(S): 45 TABLET, ORALLY DISINTEGRATING ORAL at 19:31

## 2024-02-11 RX ADMIN — Medication 1 MILLIGRAM(S): at 19:32

## 2024-02-11 RX ADMIN — SODIUM CHLORIDE 50 MILLILITER(S): 9 INJECTION, SOLUTION INTRAVENOUS at 01:32

## 2024-02-11 RX ADMIN — ENOXAPARIN SODIUM 40 MILLIGRAM(S): 100 INJECTION SUBCUTANEOUS at 22:07

## 2024-02-11 RX ADMIN — Medication 81 MILLIGRAM(S): at 19:32

## 2024-02-11 RX ADMIN — ATOVAQUONE 1500 MILLIGRAM(S): 750 SUSPENSION ORAL at 19:40

## 2024-02-11 RX ADMIN — Medication 1 TABLET(S): at 19:33

## 2024-02-11 NOTE — PROGRESS NOTE ADULT - PROBLEM SELECTOR PLAN 2
Patient is A&Ox0 on admission, currently improved to ANO2-3. AMS likely 2/2 toxic metabolic derangements as below, but per neuro less likely neurovascular etiology. Also with known parkinson's disease for the past 8 months.  Per daughter at baseline patient is A&Ox3 and conversant but has been deteriorating since mid-jan.     CTH, CTA H/N with "No acute intracranial hemorrhage, mass, or large vessel occlusion. No aneurysm. prominent lateral and third ventricles; severely diminutive vertebrobasilar system with small areas of minimal filling in the basilar artery. Prominent b/l PCOMs"  MRI/MRA brain/orbit: Unremarkable MR of the orbits. No acute infarct.   intracranial atherosclerosis cavernous and clinoid segments of the internal carotid arteries, at least mild-to-moderate on the right, more mild on the left  per neuro: c/w ASA 81mg, atorvastatin 40mg hs  optho following, will reexamine pt tomorrow   Management for electrolyte abnormalities as below and treat UTI as below  S/S eval- pt refused to participate with SLP. pt is alert but refuses to eat, discussed with patient and daughter, who wants to try pureed diet, Understands aspiration risk, wants to hold off ng tube   Neuro checks    Outpt [facility] psych follow up.

## 2024-02-11 NOTE — PROVIDER CONTACT NOTE (OTHER) - BACKGROUND
Pt. admitted for s/p fall.  Per pt.s daughter, pt. has had many falls due to vision impairment.
Admitted for Acute cystitis without hematuria
Pt. admitted for s/p fall. Pt from [facility].

## 2024-02-11 NOTE — PROGRESS NOTE ADULT - SUBJECTIVE AND OBJECTIVE BOX
Tiana Roy MD  Pager [phone]    CHIEF COMPLAINT: Patient is a 79y old  female who presents with a chief complaint of vision loss (10 Feb 2024 13:36)      SUBJECTIVE / OVERNIGHT EVENTS: pt not happy with soft diet, wants regular diet, refusing meds also, iv placed last night given ivf and solumedrol 50 x2 last night and this am    MEDICATIONS  (STANDING):  aspirin enteric coated 81 milliGRAM(s) Oral daily  atovaquone  Suspension 1500 milliGRAM(s) Oral daily  calcium carbonate 1250 mG  + Vitamin D (OsCal 500 + D) 1 Tablet(s) Oral daily  carbidopa/levodopa  25/100 1.5 Tablet(s) Oral three times a day  carbidopa/levodopa CR 25/100 1 Tablet(s) Oral at bedtime  dextrose 5% + sodium chloride 0.9%. 1000 milliLiter(s) (50 mL/Hr) IV Continuous <Continuous>  dextrose 5%. 1000 milliLiter(s) (50 mL/Hr) IV Continuous <Continuous>  dextrose 50% Injectable 12.5 Gram(s) IV Push once  dextrose 50% Injectable 25 Gram(s) IV Push once  enoxaparin Injectable 40 milliGRAM(s) SubCutaneous every 24 hours  fenofibrate Tablet 145 milliGRAM(s) Oral daily  ferrous    sulfate Liquid 220 milliGRAM(s) Oral daily  folic acid 1 milliGRAM(s) Oral daily  glucagon  Injectable 1 milliGRAM(s) IntraMuscular once  insulin glargine Injectable (LANTUS) 5 Unit(s) SubCutaneous at bedtime  insulin lispro (ADMELOG) corrective regimen sliding scale   SubCutaneous three times a day before meals  metoprolol succinate ER 50 milliGRAM(s) Oral daily  mirtazapine 7.5 milliGRAM(s) Oral daily  pantoprazole    Tablet 40 milliGRAM(s) Oral before breakfast  polyethylene glycol 3350 17 Gram(s) Oral daily  predniSONE   Tablet 50 milliGRAM(s) Oral daily  senna 2 Tablet(s) Oral at bedtime  silver sulfADIAZINE 1% Cream 1 Application(s) Topical two times a day    MEDICATIONS  (PRN):  acetaminophen     Tablet .. 650 milliGRAM(s) Oral every 6 hours PRN Temp greater or equal to 38C (100.4F), Mild Pain (1 - 3)  dextrose Oral Gel 15 Gram(s) Oral once PRN Blood Glucose LESS THAN 70 milliGRAM(s)/deciliter      VITALS:  T(F): 97.9 (02-11-24 @ 13:09), Max: 97.9 (02-11-24 @ 13:09)  HR: 83 (02-11-24 @ 13:09) (74 - 87)  BP: 135/77 (02-11-24 @ 13:09) (133/68 - 135/77)  RR: 17 (02-11-24 @ 13:09) (17 - 18)  SpO2: 99% (02-11-24 @ 13:09)      CAPILLARY BLOOD GLUCOSE    Output     I&O's Summary  T(F): 97.9 (02-11-24 @ 13:09), Max: 97.9 (02-11-24 @ 13:09)  HR: 83 (02-11-24 @ 13:09) (74 - 87)  BP: 135/77 (02-11-24 @ 13:09) (133/68 - 135/77)  RR: 17 (02-11-24 @ 13:09) (17 - 18)  SpO2: 99% (02-11-24 @ 13:09)    PHYSICAL EXAM:  CONSTITUTIONAL: frail elderly woman, legally blind   heent: no vision, no light perception   RESPIRATORY: Normal respiratory effort; lungs are clear  CARDIOVASCULAR: Regular rate and rhythm, normal S1 and S2, no murmur/rub/gallop; No lower extremity edema; Peripheral pulses are 2+ bilaterally  ABDOMEN: Nontender to palpation, normoactive bowel sounds, no rebound/guarding; No hepatosplenomegaly  MUSCULOSKELETAL: no clubbing or cyanosis of digits; no joint swelling or tenderness to palpation  PSYCH: A+Ox2 to person, place; affect constricted and withdrawn     LABS:              11.1                 141  | 24   | 16           5.49  >-----------< 163     ------------------------< 87                    33.6                 4.4  | 106  | 0.44                                         Ca 8.9   Mg 1.70  Ph 2.6         TPro  5.3  /  Alb  3.2      TBili  0.5  /  DBili  x         AST  37  /  ALT  12            AlkPhos  53            Urinalysis Basic - ( 10 Feb 2024 23:58 )    Color: x / Appearance: x / SG: x / pH: x  Gluc: 87 mg/dL / Ketone: x  / Bili: x / Urobili: x   Blood: x / Protein: x / Nitrite: x   Leuk Esterase: x / RBC: x / WBC x   Sq Epi: x / Non Sq Epi: x / Bacteria: x            MICROBIOLOGY:        RADIOLOGY & ADDITIONAL TESTS:    Imaging Personally Reviewed:    [ ] Consultant(s) Notes Reviewed:  [ ] Care Discussed with Consultants/Other Providers:

## 2024-02-11 NOTE — PROVIDER CONTACT NOTE (OTHER) - ACTION/TREATMENT ORDERED:
Will continue to attempt giving pt meds when family member at the bedside. Safety and comfort maintained.
No new orders/interventions at this time.
No new orders/interventions at this time.

## 2024-02-11 NOTE — PROGRESS NOTE ADULT - REASON FOR ADMISSION
AMS, Fall
ams, fall
vision loss
vision loss
AMS, Fall
GCA flare
vision loss
vision loss
AMS, Fall
GCA flare, vision loss
vision loss
AMS, Fall

## 2024-02-11 NOTE — PROGRESS NOTE ADULT - ASSESSMENT
80 yo F PMH IDDM, HTN, HLD, OP, Parkinson's disease, GCA (recently diagnosed) BIBEMS from [facility] s/p Fall and AMS. found to have toxic/metabolic derangement with hypernatremia and acute cystitis, and additionally with cf GCA flare with worsening vision.

## 2024-02-11 NOTE — PROVIDER CONTACT NOTE (OTHER) - REASON
Pt. reports seeing shadows only. Unable to see clear.
Pt. refusing medications.
Pt refused medications

## 2024-02-11 NOTE — PROVIDER CONTACT NOTE (OTHER) - SITUATION
Pt refuse medications.
Pt. refusing meds at this time.
Pt. more awake this morning and talking with staff.  Continues to refuse medications. Allowed staff to collect labs and vitals. Pt. reports she only sees shadows and unable to see clear.

## 2024-02-12 LAB
GLUCOSE BLDC GLUCOMTR-MCNC: 139 MG/DL — HIGH (ref 70–99)
GLUCOSE BLDC GLUCOMTR-MCNC: 223 MG/DL — HIGH (ref 70–99)
GLUCOSE BLDC GLUCOMTR-MCNC: 242 MG/DL — HIGH (ref 70–99)
GLUCOSE BLDC GLUCOMTR-MCNC: 253 MG/DL — HIGH (ref 70–99)

## 2024-02-12 PROCEDURE — 99232 SBSQ HOSP IP/OBS MODERATE 35: CPT

## 2024-02-12 RX ADMIN — Medication 650 MILLIGRAM(S): at 18:43

## 2024-02-12 RX ADMIN — Medication 6: at 18:45

## 2024-02-12 RX ADMIN — CARBIDOPA AND LEVODOPA 1.5 TABLET(S): 25; 100 TABLET ORAL at 23:11

## 2024-02-12 RX ADMIN — Medication 145 MILLIGRAM(S): at 13:50

## 2024-02-12 RX ADMIN — PANTOPRAZOLE SODIUM 40 MILLIGRAM(S): 20 TABLET, DELAYED RELEASE ORAL at 06:18

## 2024-02-12 RX ADMIN — INSULIN GLARGINE 5 UNIT(S): 100 INJECTION, SOLUTION SUBCUTANEOUS at 23:08

## 2024-02-12 RX ADMIN — MIRTAZAPINE 7.5 MILLIGRAM(S): 45 TABLET, ORALLY DISINTEGRATING ORAL at 13:50

## 2024-02-12 RX ADMIN — CARBIDOPA AND LEVODOPA 1.5 TABLET(S): 25; 100 TABLET ORAL at 14:07

## 2024-02-12 RX ADMIN — Medication 1 APPLICATION(S): at 06:19

## 2024-02-12 RX ADMIN — ENOXAPARIN SODIUM 40 MILLIGRAM(S): 100 INJECTION SUBCUTANEOUS at 23:10

## 2024-02-12 RX ADMIN — CARBIDOPA AND LEVODOPA 1 TABLET(S): 25; 100 TABLET ORAL at 23:11

## 2024-02-12 RX ADMIN — Medication 1 TABLET(S): at 13:51

## 2024-02-12 RX ADMIN — Medication 220 MILLIGRAM(S): at 13:52

## 2024-02-12 RX ADMIN — CARBIDOPA AND LEVODOPA 1.5 TABLET(S): 25; 100 TABLET ORAL at 06:19

## 2024-02-12 RX ADMIN — Medication 4: at 13:56

## 2024-02-12 RX ADMIN — CARBIDOPA AND LEVODOPA 1.5 TABLET(S): 25; 100 TABLET ORAL at 13:49

## 2024-02-12 RX ADMIN — ATOVAQUONE 1500 MILLIGRAM(S): 750 SUSPENSION ORAL at 13:52

## 2024-02-12 RX ADMIN — Medication 50 MILLIGRAM(S): at 06:19

## 2024-02-12 RX ADMIN — SENNA PLUS 2 TABLET(S): 8.6 TABLET ORAL at 23:10

## 2024-02-12 RX ADMIN — Medication 81 MILLIGRAM(S): at 13:52

## 2024-02-12 RX ADMIN — Medication 1 MILLIGRAM(S): at 13:51

## 2024-02-12 RX ADMIN — POLYETHYLENE GLYCOL 3350 17 GRAM(S): 17 POWDER, FOR SOLUTION ORAL at 13:52

## 2024-02-12 RX ADMIN — Medication 1 APPLICATION(S): at 18:44

## 2024-02-12 NOTE — PROGRESS NOTE ADULT - SUBJECTIVE AND OBJECTIVE BOX
Anum King MD   [facility] Medicine  Teams preferred  Pager: [phone]    Patient is a 79y old  Female who presents with a chief complaint of GCA flare, vision loss (11 Feb 2024 15:23)      INTERVAL HPI/OVERNIGHT EVENTS: No issues overnight. This AM eating breakfast. took meds this morning. denies any pain. no other concerns    MEDICATIONS  (STANDING):  aspirin enteric coated 81 milliGRAM(s) Oral daily  atovaquone  Suspension 1500 milliGRAM(s) Oral daily  calcium carbonate 1250 mG  + Vitamin D (OsCal 500 + D) 1 Tablet(s) Oral daily  carbidopa/levodopa  25/100 1.5 Tablet(s) Oral three times a day  carbidopa/levodopa CR 25/100 1 Tablet(s) Oral at bedtime  dextrose 5% + sodium chloride 0.9%. 1000 milliLiter(s) (50 mL/Hr) IV Continuous <Continuous>  dextrose 5%. 1000 milliLiter(s) (50 mL/Hr) IV Continuous <Continuous>  dextrose 50% Injectable 25 Gram(s) IV Push once  dextrose 50% Injectable 12.5 Gram(s) IV Push once  enoxaparin Injectable 40 milliGRAM(s) SubCutaneous every 24 hours  fenofibrate Tablet 145 milliGRAM(s) Oral daily  ferrous    sulfate Liquid 220 milliGRAM(s) Oral daily  folic acid 1 milliGRAM(s) Oral daily  glucagon  Injectable 1 milliGRAM(s) IntraMuscular once  insulin glargine Injectable (LANTUS) 5 Unit(s) SubCutaneous at bedtime  insulin lispro (ADMELOG) corrective regimen sliding scale   SubCutaneous three times a day before meals  metoprolol succinate ER 50 milliGRAM(s) Oral daily  mirtazapine 7.5 milliGRAM(s) Oral daily  pantoprazole    Tablet 40 milliGRAM(s) Oral before breakfast  polyethylene glycol 3350 17 Gram(s) Oral daily  predniSONE   Tablet 50 milliGRAM(s) Oral daily  senna 2 Tablet(s) Oral at bedtime  silver sulfADIAZINE 1% Cream 1 Application(s) Topical two times a day    MEDICATIONS  (PRN):  acetaminophen     Tablet .. 650 milliGRAM(s) Oral every 6 hours PRN Temp greater or equal to 38C (100.4F), Mild Pain (1 - 3)  dextrose Oral Gel 15 Gram(s) Oral once PRN Blood Glucose LESS THAN 70 milliGRAM(s)/deciliter      Allergies    No Known Allergies    Intolerances        REVIEW OF SYSTEMS:  Please see interval HPI:    Vital Signs Last 24 Hrs  T(C): 36.9 (12 Feb 2024 12:20), Max: 36.9 (12 Feb 2024 12:20)  T(F): 98.5 (12 Feb 2024 12:20), Max: 98.5 (12 Feb 2024 12:20)  HR: 79 (12 Feb 2024 12:20) (68 - 79)  BP: 109/54 (12 Feb 2024 12:20) (101/58 - 110/50)  BP(mean): --  RR: 17 (12 Feb 2024 12:20) (17 - 17)  SpO2: 99% (12 Feb 2024 12:20) (98% - 99%)    Parameters below as of 12 Feb 2024 05:50  Patient On (Oxygen Delivery Method): room air      I&O's Detail        PHYSICAL EXAM:  Vital Signs Last 24 Hrs  T(C): 36.9 (12 Feb 2024 12:20), Max: 36.9 (12 Feb 2024 12:20)  T(F): 98.5 (12 Feb 2024 12:20), Max: 98.5 (12 Feb 2024 12:20)  HR: 79 (12 Feb 2024 12:20) (68 - 79)  BP: 109/54 (12 Feb 2024 12:20) (101/58 - 110/50)  BP(mean): --  RR: 17 (12 Feb 2024 12:20) (17 - 17)  SpO2: 99% (12 Feb 2024 12:20) (98% - 99%)    Parameters below as of 12 Feb 2024 05:50  Patient On (Oxygen Delivery Method): room air      CONSTITUTIONAL: NAD,   ENMT: Moist oral mucosa,   RESPIRATORY: Normal respiratory effort; lungs are clear to auscultation bilaterally  CARDIOVASCULAR: Regular rate and rhythm, normal S1 and S2, no murmur/rub/gallop; No lower extremity edema;   ABDOMEN: Nontender to palpation, normoactive bowel sounds, no rebound/guarding; No hepatosplenomegaly  EXT: no edema b/l  NEUROLOGY: alert, following commands, some stiffness noted in upper extremities  SKIN: No rashes; no palpable lesions      LABS:      Ca    8.9        10 Feb 2024 23:58        CAPILLARY BLOOD GLUCOSE      POCT Blood Glucose.: 223 mg/dL (12 Feb 2024 12:22)  POCT Blood Glucose.: 139 mg/dL (12 Feb 2024 08:41)  POCT Blood Glucose.: 192 mg/dL (11 Feb 2024 21:20)  POCT Blood Glucose.: 239 mg/dL (11 Feb 2024 17:30)    BLOOD CULTURE    RADIOLOGY & ADDITIONAL TESTS:    Imaging Personally Reviewed:  [ ] YES     Consultant(s) Notes Reviewed:      Care Discussed with Consultants/Other Providers:

## 2024-02-12 NOTE — PROGRESS NOTE ADULT - ASSESSMENT
78 yo F PMH IDDM, HTN, HLD, OP, Parkinson's disease, GCA (recently diagnosed) BIBEMS from [facility] s/p Fall and AMS. found to have toxic/metabolic derangement with hypernatremia and acute cystitis, and additionally with cf GCA flare with worsening vision.

## 2024-02-13 LAB
GLUCOSE BLDC GLUCOMTR-MCNC: 136 MG/DL — HIGH (ref 70–99)
GLUCOSE BLDC GLUCOMTR-MCNC: 147 MG/DL — HIGH (ref 70–99)
GLUCOSE BLDC GLUCOMTR-MCNC: 352 MG/DL — HIGH (ref 70–99)
GLUCOSE BLDC GLUCOMTR-MCNC: 74 MG/DL — SIGNIFICANT CHANGE UP (ref 70–99)

## 2024-02-13 PROCEDURE — 99232 SBSQ HOSP IP/OBS MODERATE 35: CPT

## 2024-02-13 RX ADMIN — Medication 81 MILLIGRAM(S): at 13:11

## 2024-02-13 RX ADMIN — Medication 10: at 16:53

## 2024-02-13 RX ADMIN — Medication 50 MILLIGRAM(S): at 07:20

## 2024-02-13 RX ADMIN — CARBIDOPA AND LEVODOPA 1.5 TABLET(S): 25; 100 TABLET ORAL at 13:12

## 2024-02-13 RX ADMIN — MIRTAZAPINE 7.5 MILLIGRAM(S): 45 TABLET, ORALLY DISINTEGRATING ORAL at 13:11

## 2024-02-13 RX ADMIN — CARBIDOPA AND LEVODOPA 1.5 TABLET(S): 25; 100 TABLET ORAL at 07:21

## 2024-02-13 RX ADMIN — Medication 1 APPLICATION(S): at 07:20

## 2024-02-13 RX ADMIN — PANTOPRAZOLE SODIUM 40 MILLIGRAM(S): 20 TABLET, DELAYED RELEASE ORAL at 07:19

## 2024-02-13 RX ADMIN — Medication 220 MILLIGRAM(S): at 13:11

## 2024-02-13 RX ADMIN — POLYETHYLENE GLYCOL 3350 17 GRAM(S): 17 POWDER, FOR SOLUTION ORAL at 13:12

## 2024-02-13 RX ADMIN — ATOVAQUONE 1500 MILLIGRAM(S): 750 SUSPENSION ORAL at 13:11

## 2024-02-13 RX ADMIN — Medication 1 TABLET(S): at 13:11

## 2024-02-13 RX ADMIN — INSULIN GLARGINE 5 UNIT(S): 100 INJECTION, SOLUTION SUBCUTANEOUS at 22:38

## 2024-02-13 RX ADMIN — Medication 145 MILLIGRAM(S): at 13:10

## 2024-02-13 RX ADMIN — Medication 1 MILLIGRAM(S): at 13:10

## 2024-02-13 RX ADMIN — CARBIDOPA AND LEVODOPA 1.5 TABLET(S): 25; 100 TABLET ORAL at 22:38

## 2024-02-13 RX ADMIN — ENOXAPARIN SODIUM 40 MILLIGRAM(S): 100 INJECTION SUBCUTANEOUS at 22:41

## 2024-02-13 RX ADMIN — Medication 1 APPLICATION(S): at 17:14

## 2024-02-13 NOTE — PROGRESS NOTE ADULT - PROBLEM SELECTOR PLAN 11
Continue home sinemet

## 2024-02-13 NOTE — PROGRESS NOTE ADULT - PROBLEM SELECTOR PLAN 6
Resolved.  Likely 2/2 decreased PO intake and severe dehydration.     -cont to monitor BMP  - dc fluids
Resolved.  Likely 2/2 decreased PO intake and severe dehydration.     -cont to monitor BMP
Na 149->150--improved to 145  Likely 2/2 decreased PO intake and severe dehydration.     -repeat Stat BMP (this morning lab spurious)  -cont to monitor BMP  - dc fluids
replete prn  EKG NSR  Monitor BMP as above, replete K>4.0
Resolved.  Likely 2/2 decreased PO intake and severe dehydration.     -cont to monitor BMP  - dc fluids
replete prn  EKG NSR  Monitor BMP as above, replete K>4.0
Resolved.  Likely 2/2 decreased PO intake and severe dehydration.     -cont to monitor BMP  -mIVF until she's eating consistently
Na 149->150--improved to 145  Likely 2/2 decreased PO intake and severe dehydration.     -repeat Stat BMP (this morning lab spurious)  -cont to monitor BMP  - dc fluids
K 3.0  Patient is s/p KCl IV  EKG NSR  Monitor BMP as above, replete K>4.0
Resolved.  Likely 2/2 decreased PO intake and severe dehydration.     -cont to monitor BMP  -mIVF until she's eating consistently
Na 149->150--improved to 145  Likely 2/2 decreased PO intake and severe dehydration.     cont with 1/S NS 75 cc/hr for now   cont to monitor BMP

## 2024-02-13 NOTE — PROGRESS NOTE ADULT - PROBLEM SELECTOR PROBLEM 5
Hypernatremia
Vesicovaginal fistula
Hypernatremia
Vesicovaginal fistula
Hypernatremia
Vesicovaginal fistula

## 2024-02-13 NOTE — PROGRESS NOTE ADULT - PROBLEM SELECTOR PROBLEM 6
Hypernatremia
Hypokalemia
Hypokalemia
Hypernatremia
Hypokalemia
Hypernatremia

## 2024-02-13 NOTE — PROGRESS NOTE ADULT - PROBLEM SELECTOR PLAN 12
Continue home toprol XL with holding parameters

## 2024-02-13 NOTE — PROGRESS NOTE ADULT - PROBLEM SELECTOR PROBLEM 9
Anemia
Anemia
GCA (giant cell arteritis)
Anemia
GCA (giant cell arteritis)
Anemia
Anemia
GCA (giant cell arteritis)
Anemia

## 2024-02-13 NOTE — PROGRESS NOTE ADULT - PROBLEM SELECTOR PROBLEM 8
Elevated troponin
Anemia
Elevated troponin
Elevated troponin
Anemia
Anemia
Elevated troponin

## 2024-02-13 NOTE — PROGRESS NOTE ADULT - PROBLEM SELECTOR PLAN 3
UA in the ED +nitrites, moderate leukocyte esterase +protein, +ketones 4k WBC, 1.2k RBC, +glucose  s/p [id number].  Ucx with E.coli pan sensitive  passed TOV
UA in the ED +nitrites, moderate leukocyte esterase +protein, +ketones 4k WBC, 1.2k RBC, +glucose  s/p [id number].  Ucx with E.coli pan sensitive  passed TOV
UA in the ED +nitrites, moderate leukocyte esterase +protein, +ketones 4k WBC, 1.2k RBC, +glucose  s/p [id number].  Ucx with E.coli pan sensitive  passed TOV
CT A/P with acute emphysematous cystitis. Fluid in the vagina with marked vaginal mucosal enhancement and questionable locules of luminal air inseparable from the adjacent emphysematous bladder wall. Cannot exclude vesicovaginal fistula.  Appreciate urology eval, no acute surgical intervention per urology'  treat UTI as below
UA in the ED +nitrites, moderate leukocyte esterase +protein, +ketones 4k WBC, 1.2k RBC, +glucose  s/p [id number].  Ucx with E.coli pan sensitive  passed TOV
UA in the ED +nitrites, moderate leukocyte esterase +protein, +ketones 4k WBC, 1.2k RBC, +glucose  s/p [id number].  Ucx with E.coli pan sensitive  passed TOV
CT A/P with acute emphysematous cystitis. Fluid in the vagina with marked vaginal mucosal enhancement and questionable locules of luminal air inseparable from the adjacent emphysematous bladder wall. Cannot exclude vesicovaginal fistula.  Appreciate urology eval, no acute surgical intervention per urology
UA in the ED +nitrites, moderate leukocyte esterase +protein, +ketones 4k WBC, 1.2k RBC, +glucose  s/p [id number].  Ucx with E.coli pan sensitive  passed TOV
UA in the ED +nitrites, moderate leukocyte esterase +protein, +ketones 4k WBC, 1.2k RBC, +glucose  s/p [id number].  Ucx with E.coli pan sensitive  passed TOV
UA in the ED +nitrites, moderate leukocyte esterase +protein, +ketones 4k WBC, 1.2k RBC, +glucose  s/p [id number].  Ucx with E.coli pan sensitive  passed TOV
UA in the ED +nitrites, moderate leukocyte esterase +protein, +ketones 4k WBC, 1.2k RBC, +glucose  s/p [id number].  Ucx with E.coli pan sensitive  passed TOV
UA in the ED +nitrites, moderate leukocyte esterase +protein, +ketones 4k WBC, 1.2k RBC, +glucose  s/p [id number].  Ucx with E.coli pan sensitive  passed TOV
UA in the ED +nitrites, moderate leukocyte esterase +protein, +ketones 4k WBC, 1.2k RBC, +glucose  s/p [id number].  Ucx with E.coli pan sensitive  passed TOV
CT A/P with acute emphysematous cystitis. Fluid in the vagina with marked vaginal mucosal enhancement and questionable locules of luminal air inseparable from the adjacent emphysematous bladder wall. Cannot exclude vesicovaginal fistula.  Appreciate urology eval, no acute surgical intervention per urology'  treat UTI as below

## 2024-02-13 NOTE — PROGRESS NOTE ADULT - PROBLEM SELECTOR PROBLEM 3
Urinary tract infection
Vesicovaginal fistula
Urinary tract infection
Vesicovaginal fistula
Urinary tract infection
Vesicovaginal fistula

## 2024-02-13 NOTE — PROGRESS NOTE ADULT - PROBLEM SELECTOR PROBLEM 11
History of Parkinson's disease

## 2024-02-13 NOTE — PROGRESS NOTE ADULT - PROBLEM SELECTOR PROBLEM 2
Altered mental status

## 2024-02-13 NOTE — PROGRESS NOTE ADULT - PROVIDER SPECIALTY LIST ADULT
Rheumatology
Hospitalist
Hospitalist
Ophthalmology
Neurology
Hospitalist

## 2024-02-13 NOTE — PROGRESS NOTE ADULT - SUBJECTIVE AND OBJECTIVE BOX
Anum King MD   [facility] Medicine  Teams preferred  Pager: [phone]    Patient is a 79y old  Female who presents with a chief complaint of GCA flare, vision loss (11 Feb 2024 15:23)      INTERVAL HPI/OVERNIGHT EVENTS: no issues overnight. eyes are closed but answering questions. ate breakfast. took meds    MEDICATIONS  (STANDING):  aspirin enteric coated 81 milliGRAM(s) Oral daily  atovaquone  Suspension 1500 milliGRAM(s) Oral daily  calcium carbonate 1250 mG  + Vitamin D (OsCal 500 + D) 1 Tablet(s) Oral daily  carbidopa/levodopa  25/100 1.5 Tablet(s) Oral three times a day  carbidopa/levodopa CR 25/100 1 Tablet(s) Oral at bedtime  dextrose 5%. 1000 milliLiter(s) (50 mL/Hr) IV Continuous <Continuous>  dextrose 50% Injectable 25 Gram(s) IV Push once  dextrose 50% Injectable 12.5 Gram(s) IV Push once  enoxaparin Injectable 40 milliGRAM(s) SubCutaneous every 24 hours  fenofibrate Tablet 145 milliGRAM(s) Oral daily  ferrous    sulfate Liquid 220 milliGRAM(s) Oral daily  folic acid 1 milliGRAM(s) Oral daily  glucagon  Injectable 1 milliGRAM(s) IntraMuscular once  insulin glargine Injectable (LANTUS) 5 Unit(s) SubCutaneous at bedtime  insulin lispro (ADMELOG) corrective regimen sliding scale   SubCutaneous three times a day before meals  metoprolol succinate ER 50 milliGRAM(s) Oral daily  mirtazapine 7.5 milliGRAM(s) Oral daily  pantoprazole    Tablet 40 milliGRAM(s) Oral before breakfast  polyethylene glycol 3350 17 Gram(s) Oral daily  predniSONE   Tablet 50 milliGRAM(s) Oral daily  senna 2 Tablet(s) Oral at bedtime  silver sulfADIAZINE 1% Cream 1 Application(s) Topical two times a day    MEDICATIONS  (PRN):  acetaminophen     Tablet .. 650 milliGRAM(s) Oral every 6 hours PRN Temp greater or equal to 38C (100.4F), Mild Pain (1 - 3)  dextrose Oral Gel 15 Gram(s) Oral once PRN Blood Glucose LESS THAN 70 milliGRAM(s)/deciliter      Allergies    No Known Allergies    Intolerances        REVIEW OF SYSTEMS:  Please see interval HPI:    Vital Signs Last 24 Hrs  T(C): 36.7 (13 Feb 2024 11:50), Max: 36.7 (13 Feb 2024 11:50)  T(F): 98.1 (13 Feb 2024 11:50), Max: 98.1 (13 Feb 2024 11:50)  HR: 76 (13 Feb 2024 11:50) (70 - 77)  BP: 95/34 (13 Feb 2024 11:50) (95/34 - 129/61)  BP(mean): --  RR: 18 (13 Feb 2024 11:50) (17 - 18)  SpO2: 100% (13 Feb 2024 11:50) (94% - 100%)    Parameters below as of 13 Feb 2024 11:50  Patient On (Oxygen Delivery Method): room air      I&O's Detail        PHYSICAL EXAM:  Vital Signs Last 24 Hrs  T(C): 36.7 (13 Feb 2024 11:50), Max: 36.7 (13 Feb 2024 11:50)  T(F): 98.1 (13 Feb 2024 11:50), Max: 98.1 (13 Feb 2024 11:50)  HR: 76 (13 Feb 2024 11:50) (70 - 77)  BP: 95/34 (13 Feb 2024 11:50) (95/34 - 129/61)  BP(mean): --  RR: 18 (13 Feb 2024 11:50) (17 - 18)  SpO2: 100% (13 Feb 2024 11:50) (94% - 100%)    Parameters below as of 13 Feb 2024 11:50  Patient On (Oxygen Delivery Method): room air      CONSTITUTIONAL: NAD,   ENMT: Moist oral mucosa,   RESPIRATORY: Normal respiratory effort; lungs are clear to auscultation bilaterally  CARDIOVASCULAR: Regular rate and rhythm, normal S1 and S2, no murmur/rub/gallop; No lower extremity edema;   ABDOMEN: Nontender to palpation, normoactive bowel sounds, no rebound/guarding; No hepatosplenomegaly  EXT: no edema b/l  NEUROLOGY: SLEEPY but arousable, following commands  SKIN: No rashes; no palpable lesions      LABS:            CAPILLARY BLOOD GLUCOSE      POCT Blood Glucose.: 147 mg/dL (13 Feb 2024 12:15)  POCT Blood Glucose.: 74 mg/dL (13 Feb 2024 08:45)  POCT Blood Glucose.: 242 mg/dL (12 Feb 2024 22:39)  POCT Blood Glucose.: 253 mg/dL (12 Feb 2024 17:22)    BLOOD CULTURE    RADIOLOGY & ADDITIONAL TESTS:    Imaging Personally Reviewed:  [ ] YES     Consultant(s) Notes Reviewed:      Care Discussed with Consultants/Other Providers:

## 2024-02-13 NOTE — PROGRESS NOTE ADULT - PROBLEM SELECTOR PLAN 5
CT A/P with acute emphysematous cystitis. Fluid in the vagina with marked vaginal mucosal enhancement and questionable locules of luminal air inseparable from the adjacent emphysematous bladder wall. Cannot exclude vesicovaginal fistula.  Appreciate urology eval, no acute surgical intervention per urology'  s/p UTI treatement as above
CT A/P with acute emphysematous cystitis. Fluid in the vagina with marked vaginal mucosal enhancement and questionable locules of luminal air inseparable from the adjacent emphysematous bladder wall. Cannot exclude vesicovaginal fistula.  Appreciate urology eval, no acute surgical intervention per urology'  s/p UTI treatement as above
Na 149->150--improved to 145  Likely 2/2 decreased PO intake and severe dehydration.     cont with 1/S NS 75 cc/hr for now   Monitor BMP q12h, monitor for overcorrection 6-8meq within 24hr
CT A/P with acute emphysematous cystitis. Fluid in the vagina with marked vaginal mucosal enhancement and questionable locules of luminal air inseparable from the adjacent emphysematous bladder wall. Cannot exclude vesicovaginal fistula.  Appreciate urology eval, no acute surgical intervention per urology'  s/p UTI treatement as above
Na 149->150--improved to 147.   Likely 2/2 decreased PO intake and severe dehydration.     cont with D51/S  cc/hr for now   Monitor BMP q12h, monitor for overcorrection 6-8meq within 24hr
CT A/P with acute emphysematous cystitis. Fluid in the vagina with marked vaginal mucosal enhancement and questionable locules of luminal air inseparable from the adjacent emphysematous bladder wall. Cannot exclude vesicovaginal fistula.  Appreciate urology eval, no acute surgical intervention per urology'  s/p UTI treatement as above
Na 149->150  Likely 2/2 decreased PO intake  s/p NS bolus, cont with D51/2NS now   Monitor BMP q12h, monitor for overcorrection 6-8meq within 24hr

## 2024-02-13 NOTE — PROGRESS NOTE ADULT - PROBLEM SELECTOR PLAN 8
Troponin 70->54  EKG NSR TWI V3-V6, I, II, aVF c/f anterolateral and inferior ischemia  TTE LVEF >75%, no wall motion abn  Cardiology cs, appreciate recs
Hgb 11.7  f/u iron, ferritin, TIBC-doesn't suggest ANTHONY  Monitor CBC, maintain active type and screen
Troponin 70->54  EKG NSR TWI V3-V6, I, II, aVF c/f anterolateral and inferior ischemia  TTE LVEF >75%, no wall motion abn  Cardiology cs, appreciate recs
Hgb 11.7   iron, ferritin, TIBC-doesn't suggest ANTHONY  Monitor CBC, maintain active type and screen
Troponin 70->54  EKG NSR TWI V3-V6, I, II, aVF c/f anterolateral and inferior ischemia  TTE LVEF >75%, no wall motion abn  Cardiology cs, appreciate recs
Troponin 70->54  EKG NSR TWI V3-V6, I, II, aVF c/f anterolateral and inferior ischemia  TTE LVEF >75%, no wall motion abn  Cardiology cs, appreciate recs
Hgb 11.7  f/u iron, ferritin, TIBC-doesn't suggest ANTHONY  Monitor CBC, maintain active type and screen

## 2024-02-13 NOTE — PROGRESS NOTE ADULT - PROBLEM SELECTOR PLAN 4
UA in the ED +nitrites, moderate leukocyte esterase +protein, +ketones 4k WBC, 1.2k RBC, +glucose  cont with [id number].  Ucx with E.coli pan sensitive
Patient with residual vision loss since last hospitalization at [place] in January, previously treated with pulse streoids and IVIG. cf possible too fast prednisone taper at rehab, supposed to be on 40mg qd but on 20mg qd  - Evaluated by Optho- cf possible GCA flare s/p 1g solumedrol.   - Rheum consulted  -Unremarkable MR of the orbits. 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 Xin Escobar   - start mepron for pjp prophylaxis   - fu rheum recs   - currently methotrexate on hold in s.o infection   - ESR 23 and CRP 20.4
Patient with residual vision loss since last hospitalization at [place] in January, previously treated with pulse streoids and IVIG. cf possible too fast prednisone taper at rehab, supposed to be on 40mg qd but on 20mg qd  - Evaluated by Optho- cf possible GCA flare s/p 1g solumedrol.   - Rheum consulted  -Unremarkable MR of the orbits. 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 Pt has been refusing prednisone and also refusing IV.   - assuming she's taking steroid, cont prednisone 50 mg until 2/20, then switch to prednisone 40mg qd   - outpt f/u with Dr. Xin Escobar   - start mepron for pjp prophylaxis   - fu rheum recs   - currently methotrexate on hold in s.o infection   - ESR 23 and CRP 20.4  - per optho f/u on 2/6, no light perception in both eyes, pupils nonreactive 2/2 GCA flare, ocular exam otherwise wnl  - outpt f/up optho   [facility] Department of Ophthalmology  [address]  [place], [place] [id number] [phone]
Patient with residual vision loss since last hospitalization at [place] in January, previously treated with pulse streoids and IVIG. cf possible too fast prednisone taper at rehab, supposed to be on 40mg qd but on 20mg qd  - Evaluated by Optho- cf possible GCA flare s/p 1g solumedrol.   - Rheum consulted  - pending MRI Brain and orbit  - s/p 1g solumedrol on 2/2, followed by 500mg qd on 2/3 and 2/4, cont on prednisone 1mg/kg (50mg) qd per rheum starting 2/5  - fu rheum recs on further taper  - currently methotrexate on hold in s.o infection   - ESR 23 and CRP 20.4
Patient with residual vision loss since last hospitalization at [place] in January, previously treated with pulse streoids and IVIG. cf possible too fast prednisone taper at rehab, supposed to be on 40mg qd but on 20mg qd  - Evaluated by Optho- cf possible GCA flare s/p 1g solumedrol.   - Rheum consulted  -Unremarkable MR of the orbits. 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 Xin Escobar   - start mepron for pjp prophylaxis   - fu rheum recs   - currently methotrexate on hold in s.o infection   - ESR 23 and CRP 20.4  - per optho f/u on 2/6, no light perception in both eyes, pupils nonreactive 2/2 GCA flare, ocular exam otherwise wnl  - outpt f/up optho   [facility] Department of Ophthalmology  [address]  [place] [address]  [phone]
Patient with residual vision loss since last hospitalization at [place] in January, previously treated with pulse streoids and IVIG. cf possible too fast prednisone taper at rehab, supposed to be on 40mg qd but on 20mg qd  - Evaluated by Optho- cf possible GCA flare s/p 1g solumedrol.   - Rheum consulted  -Unremarkable MR of the orbits. 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 Xin Escobar   - start mepron for pjp prophylaxis   - fu rheum recs   - currently methotrexate on hold in s.o infection   - ESR 23 and CRP 20.4  - per optho f/u on 2/6, no light perception in both eyes, pupils nonreactive 2/2 GCA flare, ocular exam otherwise wnl  - outpt f/up optho   [facility] Department of Ophthalmology  [address]  [place] [address]  [phone]
UA in the ED +nitrites, moderate leukocyte esterase +protein, +ketones 4k WBC, 1.2k RBC, +glucose  cont with ceftriaxone  Ucx with E.coli pending speciation
Patient with residual vision loss since last hospitalization at [place] in January, previously treated with pulse streoids and IVIG. cf possible too fast prednisone taper at rehab, supposed to be on 40mg qd but on 20mg qd  Evaluated by Optho- cf possible GCA flare s/p 1g solumedrol.   - Rheum consulted  - pending MRI Brain and orbit  - cont with 500mg solumedrol [id number], followed by 1mg/kg prednisone taper per rheum  - currently methotrexate on hold in s.o infection   - ESR 23 and CRP 20.4
UA in the ED +nitrites, moderate leukocyte esterase +protein, +ketones 4k WBC, 1.2k RBC, +glucose  cont with ceftriaxone  fu ucx
Patient with residual vision loss since last hospitalization at [place] in January, previously treated with pulse streoids and IVIG. cf possible too fast prednisone taper at rehab, supposed to be on 40mg qd but on 20mg qd  - Evaluated by Optho- cf possible GCA flare s/p 1g solumedrol.   - Rheum consulted  -Unremarkable MR of the orbits. 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 Pt has been refusing prednisone and also refusing IV.   - assuming she's taking steroid, cont prednisone 50 mg until 2/20, then switch to prednisone 40mg qd   - outpt f/u with Dr. Xin Escobar   - start mepron for pjp prophylaxis   - fu rheum recs   - currently methotrexate on hold in s.o infection   - ESR 23 and CRP 20.4  - per optho f/u on 2/6, no light perception in both eyes, pupils nonreactive 2/2 GCA flare, ocular exam otherwise wnl  - outpt f/up optho   [facility] Department of Ophthalmology  [address]  [place], [place] [id number] [phone]
Patient with residual vision loss since last hospitalization at [place] in January, previously treated with pulse streoids and IVIG. cf possible too fast prednisone taper at rehab, supposed to be on 40mg qd but on 20mg qd  - Evaluated by Optho- cf possible GCA flare s/p 1g solumedrol.   - Rheum consulted  -Unremarkable MR of the orbits. 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 Pt has been refusing prednisone and also refusing IV.   - outpt f/u with Dr. Xin Escobar   - start mepron for pjp prophylaxis   - fu rheum recs   - currently methotrexate on hold in s.o infection   - ESR 23 and CRP 20.4  - per optho f/u on 2/6, no light perception in both eyes, pupils nonreactive 2/2 GCA flare, ocular exam otherwise wnl  - outpt f/up optho   [facility] Department of Ophthalmology  [address]  [place], [place] [id number] [phone]
Patient with residual vision loss since last hospitalization at [place] in January, previously treated with pulse streoids and IVIG. cf possible too fast prednisone taper at rehab, supposed to be on 40mg qd but on 20mg qd  - Evaluated by Optho- cf possible GCA flare s/p 1g solumedrol.   - Rheum consulted  -Unremarkable MR of the orbits. 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 Xin Escobar   - start mepron for pjp prophylaxis   - fu rheum recs   - currently methotrexate on hold in s.o infection   - ESR 23 and CRP 20.4  - per optho f/u on 2/6, no light perception in both eyes, pupils nonreactive 2/2 GCA flare, ocular exam otherwise wnl  - outpt f/up optho   [facility] Department of Ophthalmology  [address]  [place] [address]  [phone]
Patient with residual vision loss since last hospitalization at [place] in January, previously treated with pulse streoids and IVIG. cf possible too fast prednisone taper at rehab, supposed to be on 40mg qd but on 20mg qd  - Evaluated by Optho- cf possible GCA flare s/p 1g solumedrol.   - Rheum consulted  -Unremarkable MR of the orbits. 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 Pt has been refusing prednisone and also refusing IV.   - assuming she's taking steroid, cont prednisone 50 mg until 2/20, then switch to prednisone 40mg qd   - outpt f/u with Dr. Xin Escobar   - start mepron for pjp prophylaxis   - fu rheum recs   - currently methotrexate on hold in s.o infection   - ESR 23 and CRP 20.4  - per optho f/u on 2/6, no light perception in both eyes, pupils nonreactive 2/2 GCA flare, ocular exam otherwise wnl  - outpt f/up optho   [facility] Department of Ophthalmology  [address]  [place], [place] [id number] [phone]
Patient with residual vision loss since last hospitalization at [place] in January, previously treated with pulse streoids and IVIG. cf possible too fast prednisone taper at rehab, supposed to be on 40mg qd but on 20mg qd  - Evaluated by Optho- cf possible GCA flare s/p 1g solumedrol.   - Rheum consulted  -Unremarkable MR of the orbits. 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 Xin Escobar   - start mepron for pjp prophylaxis   - fu rheum recs   - currently methotrexate on hold in s.o infection   - ESR 23 and CRP 20.4  - per optho f/u on 2/6, no light perception in both eyes, pupils nonreactive 2/2 GCA flare, ocular exam otherwise wnl  - outpt f/up optho   [facility] Department of Ophthalmology  [address]  [place] [address]  [phone]

## 2024-02-13 NOTE — PROGRESS NOTE ADULT - PROBLEM SELECTOR PLAN 7
replete prn  EKG NSR  Monitor BMP as above, replete K>4.0
Troponin 70->54  EKG NSR TWI V3-V6, I, II, aVF c/f anterolateral and inferior ischemia  fu TTE- pending  Cardiology cs, appreciate recs
replete prn  EKG NSR  Monitor BMP as above, replete K>4.0
Troponin 70->54  EKG NSR TWI V3-V6, I, II, aVF c/f anterolateral and inferior ischemia  TTE LVEF >75%, no wall motion abn  Cardiology cs, appreciate recs
Troponin 70->54  EKG NSR TWI V3-V6, I, II, aVF c/f anterolateral and inferior ischemia  f/u cardiology consult

## 2024-02-13 NOTE — PROGRESS NOTE ADULT - PROBLEM SELECTOR PLAN 9
Hgb 11.7   iron, ferritin, TIBC-doesn't suggest ANTHONY, FOLATE/B12 WNL  Monitor CBC, maintain active type and screen
Hgb 11.7   iron, ferritin, TIBC-doesn't suggest ANTHONY, FOLATE/B12 WNL  Monitor CBC, maintain active type and screen
Patient with residual vision loss since last hospitalization at [place] in January, treated with pulse streoids and IVIG. cf possible too fast prednisone taper at rehab, supposed to be on 40mg qd but on 20mg qd  Evaluated by Optho- cf possible GCA flare s/p 1g solumedrol.   - Rheum consulted  - pending MRI Brain and orbit  - pulse steroids per rheum  - currently methotrexate on hold in s.o infection   - ESR 23 and CRP 20.4
Patient with residual vision loss since last hospitalization at [place] in January.   Continue home prednisone  per rheum hold methotrexate in so infection  Optho consulted
Hgb 11.7   iron, ferritin, TIBC-doesn't suggest ANTHONY, FOLATE/B12 WNL  Monitor CBC, maintain active type and screen
Hgb 11.7   iron, ferritin, TIBC-doesn't suggest ANTHONY, FOLATE/B12 WNL  Monitor CBC, maintain active type and screen
Patient with residual vision loss since last hospitalization at [place]  Continue home prednisone  per rheum hold methotrexate in so infection
Hgb 11.7   iron, ferritin, TIBC-doesn't suggest ANTHONY, FOLATE/B12 WNL  Monitor CBC, maintain active type and screen

## 2024-02-13 NOTE — PROGRESS NOTE ADULT - PROBLEM SELECTOR PLAN 1
Patient had fall at [facility] found on the floor and AMS. per daughter, hasn't been eating at the rehab and also with multiple falls recently due to vision impairment.   CTH was negative for bleed or infarct but showed hydrocephalus of lateral and third ventricles and narrowed callosal angle suspicious for normal pressure hydrocephalus.   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 Cleared by optho and rheum for DC, medically cleared for DC home with home care, daughter declined rehab, f/u CM/SW.  Outpt f/up ophtho and rheum. Per CM, pending delivery of wheelchair and hospital bed.
Patient had fall at [facility] found on the floor and AMS. per daughter, hasn't been eating at the rehab and also with multiple falls recently due to vision impairment.   CTH was negative for bleed or infarct but showed hydrocephalus of lateral and third ventricles and narrowed callosal angle suspicious for normal pressure hydrocephalus.   Neuro consult recs, per neuro likely mild NPH likely insignificant, recommends MR Brain  TTE- LVEF 75%, no wall motion abn  pt is not eating or taking meds, d/w daughter, pt wouldn't eat or take meds until she gets regular diet, will try regular diet, please help patient with feeding as she's legally blind, aspiration precaution, daughter denies hx of aspiration or choking on food  Fall and aspiration precaution  Dispo: DC home once she's eating and taking her meds consistently , updated daughter on 2/11  Medically cleared by rheum and ophtho for DC,  and today has improved oral intake. DC home with home care, daughter declined rehab, f/u CM/SW.  Outpt f/up ophtho and rheum. Per CM, wheelchair and hospital bed have been delivered. Family confirmed delivery.
Patient had fall at [facility] found on the floor and AMS. per daughter, hasn't been eating at the rehab and also with multiple falls recently due to vision impairment.   CTH was negative for bleed or infarct but showed hydrocephalus of lateral and third ventricles and narrowed callosal angle suspicious for normal pressure hydrocephalus.   Neuro consult recs, per neuro likely mild NPH likely insignificant, recommends MR Brain  f/u TTE- LVEF 75%, no wall motion abn  Maintain neuro checks  FU PT/OT  Fall precautions
Patient had fall at [facility] found on the floor and AMS. per daughter, hasn't been eating at the rehab and also with multiple falls recently due to vision impairment.   CTH was negative for bleed or infarct but showed hydrocephalus of lateral and third ventricles and narrowed callosal angle suspicious for normal pressure hydrocephalus.   Neuro consult recs, per neuro likely mild NPH likely insignificant, recommends MR Brain  TTE- LVEF 75%, no wall motion abn  Cont pureed diet, not cooperating with SLP eval  Maintain neuro checks  Fall precautions  Dispo: cleared by optho and rheum for DC, medically cleared for DC home with home care, daughter declined rehab, f/u CM/SW.  Outpt f/up ophtho and rheum.
Patient had fall at [facility] found on the floor and AMS. per daughter, hasn't been eating at the rehab and also with multiple falls recently due to vision impairment.   CTH was negative for bleed or infarct but showed hydrocephalus of lateral and third ventricles and narrowed callosal angle suspicious for normal pressure hydrocephalus.   Neuro consult recs, per neuro likely mild NPH likely insignificant, recommends MR Brain  TTE- LVEF 75%, no wall motion abn  pt is not eating or taking meds, d/w daughter, pt wouldn't eat or take meds until she gets regular diet, will try regular diet, please help patient with feeding as she's legally blind, aspiration precaution, daughter denies hx of aspiration or choking on food  Fall and aspiration precaution  Dispo: DC home once she's eating and taking her meds consistently , updated daughter on 2/11  Medically cleared by rheum and ophtho for DC,  and today has improved oral intake. DC home with home care, daughter declined rehab, f/u CM/SW.  Outpt f/up ophtho and rheum. Per CM, wheelchair and hospital bed have been delivered. Family confirmed delivery.
Patient had fall at [facility] found on the floor and AMS. per daughter, hasn't been eating at the rehab and also with multiple falls recently due to vision impairment.   CTH was negative for bleed or infarct but showed hydrocephalus of lateral and third ventricles and narrowed callosal angle suspicious for normal pressure hydrocephalus.   Neuro consult recs, per neuro likely mild NPH likely insignificant, recommends MR Brain  f/u TTE- LVEF 75%, no wall motion abn  Maintain neuro checks  FU PT/OT  Fall precautions
Patient had fall at [facility] found on the floor, attempted to contact facility for further history  CTH was negative for bleed or infarct but showed hydrocephalus of lateral and third ventricles and narrowed callosal angle suspicious for normal pressure hydrocephalus.   Neuro consult recs, per neuro likely mild NPH, recommends MR Brain   f/u TTE  Maintain neuro checks  FU PT/OT  Fall precautions
Patient had fall at [facility] found on the floor and AMS. per daughter, hasn't been eating at the rehab and also with multiple falls recently due to vision impairment.   CTH was negative for bleed or infarct but showed hydrocephalus of lateral and third ventricles and narrowed callosal angle suspicious for normal pressure hydrocephalus.   Neuro consult recs, per neuro likely mild NPH likely insignificant, recommends MR Brain  TTE- LVEF 75%, no wall motion abn  pt not cooperating with SLP eval, multiple attempts made, dc puree diet and try her on soft diet  Maintain neuro checks  Fall precautions  Dispo: inpt, needs to make sure she's taking meds before discharge.   Medically cleared by rheum and ophtho for DC, but pt refusing meds.  DC home with home care, daughter declined rehab, f/u CM/SW.  Outpt f/up ophtho and rheum. Per CM, wheelchair and hospital bed have been delivered. Family confirmed delivery.
Patient had fall at [facility] found on the floor and AMS. per daughter, hasn't been eating at the rehab and also with multiple falls recently due to vision impairment.   CTH was negative for bleed or infarct but showed hydrocephalus of lateral and third ventricles and narrowed callosal angle suspicious for normal pressure hydrocephalus.   Neuro consult recs, per neuro likely mild NPH likely insignificant, recommends MR Brain  TTE- LVEF 75%, no wall motion abn  Cont pureed diet, not cooperating with SLP eval  Maintain neuro checks  Fall precautions  Dispo: DC plan home with home care today 2/9. Cleared by optho and rheum for DC, medically cleared for DC home with home care, daughter declined rehab, f/u CM/SW.  Outpt f/up ophtho and rheum. Per CM, pending delivery of wheelchair and hospital bed - family say it is delivered.
Patient had fall at [facility] found on the floor and AMS. per daughter, hasn't been eating at the rehab and also with multiple falls recently due to vision impairment.   CTH was negative for bleed or infarct but showed hydrocephalus of lateral and third ventricles and narrowed callosal angle suspicious for normal pressure hydrocephalus.   Neuro consult recs, per neuro likely mild NPH likely insignificant, recommends MR Brain  f/u TTE- LVEF 75%, no wall motion abn  Cont pureed diet, not cooperating with SLP eval  Maintain neuro checks  Fall precautions  Dispo: rehab when cleared by ophthalmology, prednisone taper per rheum, f/u SW
Patient had fall at [facility] found on the floor and AMS. per daughter, hasn't been eating at the rehab and also with multiple falls recently due to vision impairment.   CTH was negative for bleed or infarct but showed hydrocephalus of lateral and third ventricles and narrowed callosal angle suspicious for normal pressure hydrocephalus.   Neuro consult recs, per neuro likely mild NPH likely insignificant, recommends MR Brain  TTE- LVEF 75%, no wall motion abn  pt is not eating or taking meds, d/w daughter, pt wouldn't eat or take meds until she gets regular diet, will try regular diet, please help patient with feeding as she's legally blind, aspiration precaution, daughter denies hx of aspiration or choking on food  Fall and aspiration precaution  Dispo: DC home once she's eating and taking her meds consistently , updated daughter on 2/11  Medically cleared by rheum and ophtho for DC, but pt refusing meds.  DC home with home care, daughter declined rehab, f/u CM/SW.  Outpt f/up ophtho and rheum. Per CM, wheelchair and hospital bed have been delivered. Family confirmed delivery.
Patient had fall at [facility] found on the floor and AMS. per daughter, hasn't been eating at the rehab and also with multiple falls recently due to vision impairment.   CTH was negative for bleed or infarct but showed hydrocephalus of lateral and third ventricles and narrowed callosal angle suspicious for normal pressure hydrocephalus.   Neuro consult recs, per neuro likely mild NPH likely insignificant, recommends MR Brain (but doesn't need to hold dc)  f/u TTE- LVEF 75%, no wall motion abn  Maintain neuro checks  FU PT/OT  Fall precautions
Patient had fall at [facility] found on the floor and AMS. per daughter, hasn't been eating at the rehab and also with multiple falls recently due to vision impairment.   CTH was negative for bleed or infarct but showed hydrocephalus of lateral and third ventricles and narrowed callosal angle suspicious for normal pressure hydrocephalus.   Neuro consult recs, per neuro likely mild NPH likely insignificant, recommends MR Brain  TTE- LVEF 75%, no wall motion abn  Cont pureed diet, not cooperating with SLP eval  Maintain neuro checks  Fall precautions  Dispo: cleared by optho and rheum for DC, medically cleared for DC home with home care, daughter declined rehab, f/u CM/SW.  Outpt f/up ophtho and rheum. Per CM, pending delivery of wheelchair and hospital bed.
Patient had fall at [facility] found on the floor and AMS. per daughter, hasn't been eating at the rehab and also with multiple falls recently due to vision impairment.   CTH was negative for bleed or infarct but showed hydrocephalus of lateral and third ventricles and narrowed callosal angle suspicious for normal pressure hydrocephalus.   Neuro consult recs, per neuro likely mild NPH likely insignificant, recommends MR Brain (but doesn't need to hold dc)  f/u TTE- pending   Maintain neuro checks  FU PT/OT  Fall precautions

## 2024-02-13 NOTE — PROGRESS NOTE ADULT - PROBLEM SELECTOR PROBLEM 10
Diabetes

## 2024-02-13 NOTE — PROGRESS NOTE ADULT - ASSESSMENT
80 yo F PMH IDDM, HTN, HLD, OP, Parkinson's disease, GCA (recently diagnosed) BIBEMS from [facility] s/p Fall and AMS. found to have toxic/metabolic derangement with hypernatremia and acute cystitis, and additionally with cf GCA flare with worsening vision.

## 2024-02-13 NOTE — PROGRESS NOTE ADULT - PROBLEM SELECTOR PROBLEM 4
GCA (giant cell arteritis)
Urinary tract infection
GCA (giant cell arteritis)
Urinary tract infection
GCA (giant cell arteritis)
Urinary tract infection
GCA (giant cell arteritis)

## 2024-02-13 NOTE — PROGRESS NOTE ADULT - PROBLEM SELECTOR PROBLEM 7
Hypokalemia
Elevated troponin
Elevated troponin
Hypokalemia
Elevated troponin
Hypokalemia

## 2024-02-14 VITALS
RESPIRATION RATE: 16 BRPM | OXYGEN SATURATION: 98 % | DIASTOLIC BLOOD PRESSURE: 48 MMHG | HEART RATE: 92 BPM | TEMPERATURE: 98 F | SYSTOLIC BLOOD PRESSURE: 132 MMHG

## 2024-02-14 LAB
GLUCOSE BLDC GLUCOMTR-MCNC: 122 MG/DL — HIGH (ref 70–99)
GLUCOSE BLDC GLUCOMTR-MCNC: 269 MG/DL — HIGH (ref 70–99)

## 2024-02-14 PROCEDURE — 99239 HOSP IP/OBS DSCHRG MGMT >30: CPT

## 2024-02-14 RX ORDER — ATOVAQUONE 750 MG/5ML
10 SUSPENSION ORAL
Qty: 300 | Refills: 0
Start: 2024-02-14 | End: 2024-03-14

## 2024-02-14 RX ADMIN — Medication 1 TABLET(S): at 12:13

## 2024-02-14 RX ADMIN — CARBIDOPA AND LEVODOPA 1.5 TABLET(S): 25; 100 TABLET ORAL at 13:27

## 2024-02-14 RX ADMIN — ATOVAQUONE 1500 MILLIGRAM(S): 750 SUSPENSION ORAL at 12:14

## 2024-02-14 RX ADMIN — PANTOPRAZOLE SODIUM 40 MILLIGRAM(S): 20 TABLET, DELAYED RELEASE ORAL at 06:52

## 2024-02-14 RX ADMIN — CARBIDOPA AND LEVODOPA 1.5 TABLET(S): 25; 100 TABLET ORAL at 06:51

## 2024-02-14 RX ADMIN — Medication 220 MILLIGRAM(S): at 12:14

## 2024-02-14 RX ADMIN — Medication 145 MILLIGRAM(S): at 12:13

## 2024-02-14 RX ADMIN — Medication 81 MILLIGRAM(S): at 12:13

## 2024-02-14 RX ADMIN — Medication 1 APPLICATION(S): at 06:51

## 2024-02-14 RX ADMIN — Medication 6: at 13:27

## 2024-02-14 RX ADMIN — Medication 50 MILLIGRAM(S): at 06:52

## 2024-02-14 RX ADMIN — MIRTAZAPINE 7.5 MILLIGRAM(S): 45 TABLET, ORALLY DISINTEGRATING ORAL at 12:13

## 2024-02-14 RX ADMIN — Medication 50 MILLIGRAM(S): at 06:51

## 2024-02-14 RX ADMIN — Medication 1 MILLIGRAM(S): at 12:13

## 2024-02-14 RX ADMIN — POLYETHYLENE GLYCOL 3350 17 GRAM(S): 17 POWDER, FOR SOLUTION ORAL at 12:14

## 2024-02-21 PROBLEM — M31.6 OTHER GIANT CELL ARTERITIS: Chronic | Status: ACTIVE | Noted: 2024-01-30

## 2024-02-21 PROBLEM — Z86.69 PERSONAL HISTORY OF OTHER DISEASES OF THE NERVOUS SYSTEM AND SENSE ORGANS: Chronic | Status: ACTIVE | Noted: 2024-01-30

## 2024-02-21 PROBLEM — E11.9 TYPE 2 DIABETES MELLITUS WITHOUT COMPLICATIONS: Chronic | Status: ACTIVE | Noted: 2024-01-30

## 2024-02-21 PROBLEM — I10 ESSENTIAL (PRIMARY) HYPERTENSION: Chronic | Status: ACTIVE | Noted: 2024-01-30

## 2024-03-04 ENCOUNTER — APPOINTMENT (OUTPATIENT)
Dept: RHEUMATOLOGY | Facility: CLINIC | Age: 79
End: 2024-03-04

## 2024-03-04 DIAGNOSIS — M31.6 OTHER GIANT CELL ARTERITIS: ICD-10-CM

## 2024-03-14 ENCOUNTER — LABORATORY RESULT (OUTPATIENT)
Age: 79
End: 2024-03-14

## 2024-03-21 ENCOUNTER — APPOINTMENT (OUTPATIENT)
Dept: RHEUMATOLOGY | Facility: CLINIC | Age: 79
End: 2024-03-21
Payer: MEDICARE

## 2024-03-21 PROCEDURE — 99442: CPT | Mod: 93

## 2024-03-21 RX ORDER — PREDNISONE 5 MG/1
5 TABLET ORAL
Qty: 105 | Refills: 1 | Status: ACTIVE | COMMUNITY
Start: 2024-03-21 | End: 1900-01-01

## 2024-03-21 NOTE — REASON FOR VISIT
[Post Hospitalization] : a post hospitalization visit [Family Member] : family member [FreeTextEntry1] : Possible GCA

## 2024-04-12 ENCOUNTER — NON-APPOINTMENT (OUTPATIENT)
Age: 79
End: 2024-04-12

## 2024-05-01 ENCOUNTER — LABORATORY RESULT (OUTPATIENT)
Age: 79
End: 2024-05-01

## 2024-05-06 ENCOUNTER — APPOINTMENT (OUTPATIENT)
Dept: RHEUMATOLOGY | Facility: CLINIC | Age: 79
End: 2024-05-06
Payer: MEDICARE

## 2024-05-06 PROCEDURE — 99442: CPT

## 2024-05-17 ENCOUNTER — LABORATORY RESULT (OUTPATIENT)
Age: 79
End: 2024-05-17

## 2024-05-20 ENCOUNTER — LABORATORY RESULT (OUTPATIENT)
Age: 79
End: 2024-05-20

## 2024-05-20 ENCOUNTER — TRANSCRIPTION ENCOUNTER (OUTPATIENT)
Age: 79
End: 2024-05-20

## 2024-05-21 ENCOUNTER — LABORATORY RESULT (OUTPATIENT)
Age: 79
End: 2024-05-21

## 2024-06-11 ENCOUNTER — APPOINTMENT (OUTPATIENT)
Dept: NEUROLOGY | Facility: CLINIC | Age: 79
End: 2024-06-11
Payer: MEDICARE

## 2024-06-11 VITALS — SYSTOLIC BLOOD PRESSURE: 155 MMHG | DIASTOLIC BLOOD PRESSURE: 68 MMHG | HEIGHT: 62 IN | HEART RATE: 71 BPM

## 2024-06-11 PROCEDURE — G2211 COMPLEX E/M VISIT ADD ON: CPT | Mod: NC

## 2024-06-11 PROCEDURE — 99215 OFFICE O/P EST HI 40 MIN: CPT | Mod: 25

## 2024-06-11 PROCEDURE — G2212 PROLONG OUTPT/OFFICE VIS: CPT

## 2024-06-11 RX ORDER — VITAMIN E ACID SUCCINATE 268 MG
TABLET ORAL
Refills: 0 | Status: DISCONTINUED | COMMUNITY
End: 2024-06-11

## 2024-06-11 RX ORDER — ASPIRIN 325 MG/1
325 TABLET, FILM COATED ORAL
Refills: 0 | Status: ACTIVE | COMMUNITY

## 2024-06-11 RX ORDER — ATORVASTATIN CALCIUM 40 MG/1
40 TABLET, FILM COATED ORAL
Qty: 90 | Refills: 0 | Status: DISCONTINUED | COMMUNITY
Start: 2021-03-04 | End: 2024-06-11

## 2024-06-11 RX ORDER — INSULIN GLARGINE 100 [IU]/ML
INJECTION, SOLUTION SUBCUTANEOUS
Refills: 0 | Status: ACTIVE | COMMUNITY

## 2024-06-11 RX ORDER — CARBIDOPA 25 MG/1
TABLET ORAL
Refills: 0 | Status: DISCONTINUED | COMMUNITY
End: 2024-06-11

## 2024-06-11 RX ORDER — INSULIN LISPRO 100 [IU]/ML
INJECTION, SOLUTION INTRAVENOUS; SUBCUTANEOUS
Refills: 0 | Status: ACTIVE | COMMUNITY

## 2024-06-11 RX ORDER — IBANDRONATE SODIUM 150 MG/1
150 TABLET ORAL
Qty: 3 | Refills: 0 | Status: DISCONTINUED | COMMUNITY
Start: 2021-03-04 | End: 2024-06-11

## 2024-06-11 RX ORDER — ASCORBIC ACID 500 MG
TABLET ORAL
Refills: 0 | Status: DISCONTINUED | COMMUNITY
End: 2024-06-11

## 2024-06-11 RX ORDER — METOPROLOL TARTRATE 75 MG/1
TABLET, FILM COATED ORAL
Refills: 0 | Status: DISCONTINUED | COMMUNITY
End: 2024-06-11

## 2024-06-11 RX ORDER — CARBIDOPA AND LEVODOPA 25; 100 MG/1; MG/1
25-100 TABLET ORAL
Refills: 0 | Status: ACTIVE | COMMUNITY

## 2024-06-11 RX ORDER — VITAMIN B COMPLEX
CAPSULE ORAL
Refills: 0 | Status: DISCONTINUED | COMMUNITY
End: 2024-06-11

## 2024-06-11 RX ORDER — METFORMIN HYDROCHLORIDE 625 MG/1
TABLET ORAL
Refills: 0 | Status: DISCONTINUED | COMMUNITY
End: 2024-06-11

## 2024-06-11 NOTE — DISCUSSION/SUMMARY
[FreeTextEntry1] : Her neurologic exam shows mild cognitive impairment; she is wheelchair-bound for uncertain reasons as, other than severely reduced vision, there does not seem to be significant focality.  To summarize, she presented to [facility] on 1/31/24 with encephalopathy, consistent with diffuse cerebral dysfunction.  She had previously been ambulatory but has been wheelchair or bedbound since her hospitalization, but again, the reason remains unclear..  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 The encephalopathy has persisted since then  with change in personality, paranoia, and confabulation, as well as hallucinations and delusions; etiology is unknown. 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  She is taking Carbidopa/Levodopa for presumed Parkinson's disease. She does not have typical Parkinsonian features, so I wonder if she requires the Carbidopa/Levodopa, or if a side effect from the medication could be contributing to her encephalopathy/psychosis.  CNS vasculitis from giant cell arteritis seems unlikely,  Neurovascular imaging showed multifocal intracranial more than extracranial atherosclerosis, particularly involving a hypoplastic vertebrobasilar system, and the stenoses are probably asymptomatic.    Her visual hallucinations could be release hallucinations versus perhaps Adrien Bonnet syndrome. We discussed that this is a common phenomenon in patients with poor vision and do not raise concern for intracranial pathology.  To clarify the Parkinson's diagnosis and for an opinion regarding etiologies of her cognitive issues, I recommend she consult with one of our movement disorder specialists and one of our cognitive disorder specialists. Referral provided.  She should benefit from aggressive vascular risk factor control. She should continue to take Aspirin 81mg daily.   She can follow up on an as needed basis. I hope she remains free of further serious trouble.

## 2024-06-11 NOTE — CONSULT LETTER
[Dear  ___] : Dear  [unfilled], [Consult Letter:] : I had the pleasure of evaluating your patient, [unfilled]. [Please see my note below.] : Please see my note below. [Consult Closing:] : Thank you very much for allowing me to participate in the care of this patient.  If you have any questions, please do not hesitate to contact me. [Sincerely,] : Sincerely, [FreeTextEntry2] : Enrike Adames MD [FreeTextEntry3] : Richard B. Libman, MD, FRCPC  , Neurology  Co-Director, Stroke Center Professor of Neurology [employer] at [facility]

## 2024-06-11 NOTE — HISTORY OF PRESENT ILLNESS
[FreeTextEntry1] : She is an 79-year-old right handed lady.  She was evaluated at [facility] on 1/31/2024 with a change in mental status.  Workup at [facility] includes: - MRI Brain (2/5/24)  Ischemic white matter disease upper range typical for age. Diffuse brain volume loss overall typical for age with pattern of lateral ventricular dilatation that likely reflects differential Central cerebral hemispheric volume loss. Communicating hydrocephalus could be  superimposed but is felt to be less likely. - CT head (1/30/2024) to my eye showed mild hydrocephalus, perhaps NPH. 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  6/11/24 She presents to the office today with her daughter and home health attendant. She states that she can see but actually is virtually blind.  she has been getting confused and having visual hallucinations. She has also experienced a change in personality, paranoia, and confabulations. She requires assistance with all her ADLs and does not walk. MRS=5.     PCP Dr. Enrike Adames

## 2024-06-11 NOTE — PHYSICAL EXAM
[General Appearance - Alert] : alert [General Appearance - In No Acute Distress] : in no acute distress [Affect] : the affect was normal [Sclera] : the sclera and conjunctiva were normal [Extraocular Movements] : extraocular movements were intact [Outer Ear] : the ears and nose were normal in appearance [Examination Of The Oral Cavity] : the lips and gums were normal [Neck Appearance] : the appearance of the neck was normal [Neck Cervical Mass (___cm)] : no neck mass was observed [Auscultation Breath Sounds / Voice Sounds] : lungs were clear to auscultation bilaterally [Heart Rate And Rhythm] : heart rate was normal and rhythm regular [Heart Sounds] : normal S1 and S2 [Heart Sounds Gallop] : no gallops [Murmurs] : no murmurs [Heart Sounds Pericardial Friction Rub] : no pericardial rub [Arterial Pulses Carotid] : carotid pulses were normal with no bruits [Edema] : there was no peripheral edema [Veins - Varicosity Changes] : there were no varicosital changes [Abnormal Walk] : normal gait [Nail Clubbing] : no clubbing  or cyanosis of the fingernails [Musculoskeletal - Swelling] : no joint swelling seen [Motor Tone] : muscle strength and tone were normal [Skin Color & Pigmentation] : normal skin color and pigmentation [Skin Turgor] : normal skin turgor [] : no rash [FreeTextEntry1] : Generally looked well but she remained in her wheelchair. Mental status exam: Alert, attentive; did not know the month but was oriented to place and year.  speech fluent/prosodic without paraphasias; followed all simple commands but more complex commands were not tested; she was able to name the president with prompting.. On cranial nerve exam, she can see hand motion, but was unable to count fingers; I was unable to see the fundi; the remainder of cranial nerves II through XII was intact. On motor exam tone was normal or there might have been intermittent cogwheeling at the wrist.  There was no obvious bradykinesia or rest tremor.  There was no drift.  She moves all extremities well versus gravity; fine finger movements were not tested.  Power was not formally tested as she had difficulty participating with the exam. Reflexes were 2+ in the arms, 1+-2+ at the knees and absent at the ankles, and plantar reflexes were downgoing. Coordination in the arms was normal given her visual loss.  Gait was not tested as she is wheelchair-bound.  On sensory exam, light touch appeared to be intact and there was no extinction.        stated president name w prompting sees hand motion 2+ arms, 1-2 knees questionable right hand cogwheeling

## 2024-06-24 ENCOUNTER — LABORATORY RESULT (OUTPATIENT)
Age: 79
End: 2024-06-24

## 2024-06-25 ENCOUNTER — LABORATORY RESULT (OUTPATIENT)
Age: 79
End: 2024-06-25

## 2024-06-27 ENCOUNTER — TRANSCRIPTION ENCOUNTER (OUTPATIENT)
Age: 79
End: 2024-06-27

## 2024-06-28 ENCOUNTER — APPOINTMENT (OUTPATIENT)
Dept: RHEUMATOLOGY | Facility: CLINIC | Age: 79
End: 2024-06-28
Payer: MEDICARE

## 2024-06-28 PROCEDURE — 99442: CPT

## 2024-07-01 ENCOUNTER — APPOINTMENT (OUTPATIENT)
Dept: UROLOGY | Facility: CLINIC | Age: 79
End: 2024-07-01
Payer: MEDICARE

## 2024-07-01 VITALS
BODY MASS INDEX: 26.68 KG/M2 | WEIGHT: 145 LBS | OXYGEN SATURATION: 95 % | HEIGHT: 62 IN | HEART RATE: 59 BPM | TEMPERATURE: 98.1 F | DIASTOLIC BLOOD PRESSURE: 67 MMHG | SYSTOLIC BLOOD PRESSURE: 147 MMHG

## 2024-07-01 DIAGNOSIS — N39.0 URINARY TRACT INFECTION, SITE NOT SPECIFIED: ICD-10-CM

## 2024-07-01 PROCEDURE — 99204 OFFICE O/P NEW MOD 45 MIN: CPT

## 2024-07-01 RX ORDER — ESTRADIOL 0.1 MG/G
0.1 CREAM VAGINAL
Qty: 1 | Refills: 5 | Status: ACTIVE | COMMUNITY
Start: 2024-07-01 | End: 1900-01-01

## 2024-07-03 ENCOUNTER — APPOINTMENT (OUTPATIENT)
Dept: NEUROLOGY | Facility: CLINIC | Age: 79
End: 2024-07-03

## 2024-07-03 LAB — BACTERIA UR CULT: NORMAL

## 2024-07-29 ENCOUNTER — APPOINTMENT (OUTPATIENT)
Dept: UROLOGY | Facility: CLINIC | Age: 79
End: 2024-07-29
Payer: MEDICARE

## 2024-07-29 VITALS — HEART RATE: 61 BPM | SYSTOLIC BLOOD PRESSURE: 164 MMHG | OXYGEN SATURATION: 95 % | DIASTOLIC BLOOD PRESSURE: 81 MMHG

## 2024-07-29 DIAGNOSIS — N39.0 URINARY TRACT INFECTION, SITE NOT SPECIFIED: ICD-10-CM

## 2024-07-29 PROCEDURE — 52000 CYSTOURETHROSCOPY: CPT

## 2024-08-04 LAB — BACTERIA UR CULT: ABNORMAL

## 2024-08-06 ENCOUNTER — APPOINTMENT (OUTPATIENT)
Dept: NEUROLOGY | Facility: CLINIC | Age: 79
End: 2024-08-06

## 2024-08-06 PROCEDURE — 99214 OFFICE O/P EST MOD 30 MIN: CPT

## 2024-08-06 NOTE — HISTORY OF PRESENT ILLNESS
[FreeTextEntry1] : Informant: patient    STANISLAV MACAROI is a 79 year woman who is here for cognitive evaluation. Sent from Dr. Libman to revaluate for Parkinson disease. She has gradual progressive cognitive decline since over past year 2 years. They noted a cognitive and physical decline after diabetic coma and she was weaker and started having difficulty walking. Prior to that she had a HHA 5 hrs because of weakness in legs and frequent falls and couldn't cook but she was able top do her adls and most iADL's.  In  she declined more physically and cognitively after a hospitalization for after diabetic coma and she was weaker and started having difficulty walking.  She fell 2023 and she has been declining since the fall. She was admitted to [facility] FOR 1 MONTHS AND SHE WAS SENT TO REHAB FOR 1 MONTH.  She DECLINED AFTER THAT and lost her vision 2023.  Prior to that 3-4 years ago she was complaining of her legs felt like cotton and she saw a neurologist dx with PD.    She last walked 2023. Cognitive decline over past few years. They first noticed she was forgetful. She wouldn't remember the streets and felt lost. She would leave stove on.  She forgets conversations, events, repeats and asks same questions. She thinks she is in [place].  She sees kids in house. She thinks daughter is going to kill her. She says she can see and she cant. She can be [id number].  She is more confused with infections which improves somewhat with treatment.  She gets PT twice a week. She is now wheelchair bound and cant stand. She now needs major assistance with basic adl's    She was dx with Parkinson's disease 3-4  years and started on Simmet which she is still taking.  HHA 24 hr  a few weeks ago started cymbalta- unclear if worked yet  Evaluated by Dr Libman 24 as per notes 24Yoanna presents to the office today with her daughter and home health attendant. She states that she can see but actually is virtually blind. she has been getting confused and having visual hallucinations. She has also experienced a change in personality, paranoia, and confabulations. She requires assistance with all her ADLs and does not walk. MRS=5.To summarize, she presented to [facility] on 24 with encephalopathy, consistent with diffuse cerebral dysfunction. She had previously been ambulatory but has been wheelchair or bedbound since her hospitalization, but again, the reason remains unclear.. 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 The encephalopathy has persisted since then with change in personality, paranoia, and confabulation, as well as hallucinations and delusions; etiology is unknown. 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 She is taking Carbidopa/Levodopa for presumed Parkinson's disease. She does not have typical Parkinsonian features, so I wonder if she requires the Carbidopa/Levodopa, or if a side effect from the medication could be contributing to her encephalopathy/psychosis. CNS vasculitis from giant cell arteritis seems unlikely, Neurovascular imaging showed multifocal intracranial more than extracranial atherosclerosis, particularly involving a hypoplastic vertebrobasilar system, and the stenoses are probably asymptomatic.  Neuro ROS: -Hx head trauma: 10 years ago fell hit head unclear circumstances -Headache: occasional headaches couldn't elaborate -Incontinence: yes -Vertigo/lightheadedness: denies -Seizures: denies -Sensory changes: c/o numbness says legs feel like cotton for years -Changes in taste/smell: denies -Visual changes: blind -Gait/Balance/falls: wheelchair bounsd  -Tremor/abnormal movements: denies -Dysphagia: denies -Syncope/autonomic dysfunction: denies   Neuropsychiatric: -Sleep:  Denies difficulty falling or staying asleep. Denies verbalizations, movements, abnormal dreams movements  -Appetite: some weight gain since 2024 -Anxiety/depression: +anxietysaying she wants to go home, saying needs to pay rent  -Suicidal/Homicidal ideations: unobtainable -Hallucinations/illusions: see hpi   PHQ2 over the last 2 weeks do u have?  (0-none, 1-several days, 2-more than half days, 3-nearly every day) unobtainable  -little interest or pleasure in doing things: -feeling down, depressed, or hopeless:   FUNCTIONALITY  QUESTIONS (ACTIVITIES of DAILY LIVING ([place])        Completely independent (2)  Needs some help (1)  Unable, or needs major assistance (0) Bathing/Showerin Dressin Toiletin Transferrin Continence: 0 Feedin Total score (0-12) =12 Lower score = greater impairment   INDEPENDENT ACTIVITIES of DAILY LIVING ([place]) Ability to Use Telephone:0 Shoppin Food Preparation:0 Housekeepin Laundry:0 Transportation:0 Responsibility for Own Medications: was paying toll 2023 Ability to Handle Finances: was paying toll 2023 Total score (0-16) =   SOCIAL HX -Smoking Hx: denies -Illicit drugs: denies -Alcohol Hx: denies -Birthplace: [place] -Lives with: daughter and HHA -Children:4, 1 passed -Health care proxy: sister    PMHx -DM -gallstones -HLD -temporal arteritis -recurrent UTI -blind   PSH -hernia repair -C section  FAMILY HX -Dementia: none  -Mother: DM -Father:   ALLERGIES  -NKA  MEDs -ASA 300mgsuppository -Sinemet 25/100mg 1.5mg qd - decreased from  -Lantus -ozempic injection -vit D -prednisone 5mg qd -metoprolol 50mg qd -glimepiride -citalopram 20mg qd -atorvastatin 20mg   ROS unobtainable   GENERAL EXAMINATION General:  Pleasant, well groomed, and in no apparent distress. Skin: Anicteric with no significant lesions noted. Eyes: Anicteric Head/Ears/Mouth/Nose/Throat: NC/AT, conjunctiva clear. Neck: Supple, full ROM. Respiratory: No respiratory distress Cardiovascular: Regular rate and rhythm. Gastrointestinal: Soft, non-tender, non-distended with no masses. Extremities: No edema or calf tenderness, Back: No deformities, no spinal tenderness   NEUROLOGIC EXAMINATION Mental Status: awake, alert, pleasant speech fluent without word finding pauses, follows 1-2 step commands, difficulty with 3 a step commands Cranial Nerves: blind, face symmetric, hearing intact to fingerrub,  tongue midline. No dysarthria. Motor: -Tone:  normal -Strength: moving all extremities equally Adventitial movements: No tremors noted. Sensation: unobtainable Reflexes: symmetrical Coordination: unobtainable Gait: wheelchair bound Frontal release signs: +grasp reflex. +palmomental reflex. No glabellar reflex.  workup - MRI Brain (24) Ischemic white matter disease upper range typical for age. Diffuse brain volume loss overall typical for age with pattern of lateral ventricular dilatation that likely reflects differential Central cerebral hemispheric volume loss. Communicating hydrocephalus could be superimposed but is felt to be less likely. - CT head (2024) to my eye showed mild hydrocephalus, perhaps NPH. 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 -mmse: knew age, country, state not year, month, date, season not monthblind, able to na,e pen wherm put in hand , able to repear    ASSESSMENT: Patient with progressive cognitive and physical decline over past 2 years. Now with significant functional decline. A progressive cognitive decline in more than one cognitive domain, affecting her independence, is consistent with dementia. Most likely diagnosis based on symptoms, exam, age and history is mixed dementia and may include Alzheimer disease/late, cerebrovascular disease and LBD.  Discussed diagnostic tests available such as neuropsychological testing, MRI brain, FDG Pet, amyloid scan and lumbar puncture.   Discussed that none of tests are definitive and may not give a diagnosis as the etiology is often multifactorial at this age.  Discussed possible diagnosis, including the natural history and incurable nature of neurodegenerative diseases.  She is not a candidate for lecanamab. no signs of parkinsonism.   PLAN:  -Advanced Directives: hcp-daughter Cognitive symptoms: -not candidate lecanamab:  -would not consider cholinesterase inhibitors given advanced disease, polypharmacy and age -risks outweigh benefits -stop Sinemet -Recommend continued supervision agitation/hallucinations -continue duloxetine -Recommend behavioral techniques for dealing with behavioral symptoms and  hallucinations.   Follow-up: 6 months

## 2024-10-07 ENCOUNTER — LABORATORY RESULT (OUTPATIENT)
Age: 79
End: 2024-10-07

## 2024-10-08 ENCOUNTER — LABORATORY RESULT (OUTPATIENT)
Age: 79
End: 2024-10-08

## 2024-10-09 ENCOUNTER — APPOINTMENT (OUTPATIENT)
Dept: RHEUMATOLOGY | Facility: CLINIC | Age: 79
End: 2024-10-09
Payer: MEDICARE

## 2024-10-09 ENCOUNTER — NON-APPOINTMENT (OUTPATIENT)
Age: 79
End: 2024-10-09

## 2024-10-09 VITALS
HEART RATE: 74 BPM | SYSTOLIC BLOOD PRESSURE: 163 MMHG | WEIGHT: 140 LBS | DIASTOLIC BLOOD PRESSURE: 79 MMHG | RESPIRATION RATE: 16 BRPM | BODY MASS INDEX: 25.76 KG/M2 | OXYGEN SATURATION: 98 % | HEIGHT: 62 IN

## 2024-10-09 DIAGNOSIS — M31.6 OTHER GIANT CELL ARTERITIS: ICD-10-CM

## 2024-10-09 PROCEDURE — 99214 OFFICE O/P EST MOD 30 MIN: CPT

## 2024-10-09 PROCEDURE — G2211 COMPLEX E/M VISIT ADD ON: CPT

## 2024-10-09 RX ORDER — PREDNISONE 1 MG/1
1 TABLET ORAL
Qty: 240 | Refills: 2 | Status: ACTIVE | COMMUNITY
Start: 2024-10-09 | End: 1900-01-01

## 2024-10-16 ENCOUNTER — APPOINTMENT (OUTPATIENT)
Dept: RADIOLOGY | Facility: CLINIC | Age: 79
End: 2024-10-16
Payer: MEDICARE

## 2024-10-16 PROCEDURE — 77080 DXA BONE DENSITY AXIAL: CPT

## 2024-11-08 ENCOUNTER — APPOINTMENT (OUTPATIENT)
Dept: RHEUMATOLOGY | Facility: CLINIC | Age: 79
End: 2024-11-08
Payer: MEDICARE

## 2024-11-08 PROCEDURE — 99442: CPT | Mod: 93

## 2024-11-12 NOTE — ASU PREOP CHECKLIST - AS BP NONINV SITE
November 12, 2024         Vasyl Alcazar  [address] [address]    Dear Mr. Alcazar:    Thank you for your interest in the Hepatology Program at [facility].    I have enclosed an itinerary of your upcoming appointments. Please arrive at least 15 minutes prior to your appointment to register.  You will need to bring a photo ID and your insurance card(s) to this appointment.      Enter through the main entrance of the hospital and proceed to the end of the galleria. In the distance, you will see a piano. When you pass the piano, make a right turn. At the end of that hallway, there will be the 2 Galleria elevators on your left. Take the “Galleria Elevators” to the 5th floor.  (We occupy the entire 5th floor). Please see enclosed map.    If you have any questions or concerns regarding your upcoming appointment, please do not hesitate to call the clinic at [phone].    Sincerely,      Emily  Clinical    [facility]  [facility]     right upper arm

## 2024-11-20 ENCOUNTER — APPOINTMENT (OUTPATIENT)
Dept: NEUROLOGY | Facility: CLINIC | Age: 79
End: 2024-11-20

## 2024-11-20 VITALS
SYSTOLIC BLOOD PRESSURE: 130 MMHG | DIASTOLIC BLOOD PRESSURE: 67 MMHG | HEART RATE: 51 BPM | BODY MASS INDEX: 25.76 KG/M2 | WEIGHT: 140 LBS | HEIGHT: 62 IN

## 2024-11-20 DIAGNOSIS — R41.89 OTHER SYMPTOMS AND SIGNS INVOLVING COGNITIVE FUNCTIONS AND AWARENESS: ICD-10-CM

## 2024-11-20 PROCEDURE — 99214 OFFICE O/P EST MOD 30 MIN: CPT

## 2024-11-20 PROCEDURE — G2211 COMPLEX E/M VISIT ADD ON: CPT

## 2024-11-20 NOTE — DIETITIAN INITIAL EVALUATION ADULT - CALCULATED FROM (G/KG)
Duplicate    
Patient was calling the office in regards to his refill for amphetamine-dextroamphetamine (ADDERALL XR) 20 MG 24 hr capsule and amphetamine-dextroamphetamine XR (ADDERALL XR) 10 MG 24 hr capsule .  Confirmed pharmacy on file with patient. Please call back if any issues.  
60.36

## 2024-11-22 ENCOUNTER — LABORATORY RESULT (OUTPATIENT)
Age: 79
End: 2024-11-22

## 2024-11-29 PROBLEM — R41.89 COGNITIVE CHANGES: Status: ACTIVE | Noted: 2024-11-29

## 2024-12-16 ENCOUNTER — APPOINTMENT (OUTPATIENT)
Dept: RHEUMATOLOGY | Facility: CLINIC | Age: 79
End: 2024-12-16
Payer: MEDICARE

## 2024-12-16 PROCEDURE — 99442: CPT | Mod: 93

## 2025-01-13 ENCOUNTER — LABORATORY RESULT (OUTPATIENT)
Age: 80
End: 2025-01-13

## 2025-01-15 ENCOUNTER — APPOINTMENT (OUTPATIENT)
Dept: RHEUMATOLOGY | Facility: CLINIC | Age: 80
End: 2025-01-15
Payer: MEDICARE

## 2025-01-15 PROCEDURE — 99212 OFFICE O/P EST SF 10 MIN: CPT | Mod: 93

## 2025-02-03 ENCOUNTER — APPOINTMENT (OUTPATIENT)
Dept: UROLOGY | Facility: CLINIC | Age: 80
End: 2025-02-03
Payer: MEDICARE

## 2025-02-03 VITALS
OXYGEN SATURATION: 99 % | SYSTOLIC BLOOD PRESSURE: 159 MMHG | HEART RATE: 66 BPM | DIASTOLIC BLOOD PRESSURE: 87 MMHG | TEMPERATURE: 98.5 F

## 2025-02-03 DIAGNOSIS — N39.0 URINARY TRACT INFECTION, SITE NOT SPECIFIED: ICD-10-CM

## 2025-02-03 PROCEDURE — G2211 COMPLEX E/M VISIT ADD ON: CPT

## 2025-02-03 PROCEDURE — 99213 OFFICE O/P EST LOW 20 MIN: CPT

## 2025-02-03 RX ORDER — NITROFURANTOIN (MONOHYDRATE/MACROCRYSTALS) 25; 75 MG/1; MG/1
100 CAPSULE ORAL
Qty: 12 | Refills: 0 | Status: ACTIVE | COMMUNITY
Start: 2025-02-03 | End: 1900-01-01

## 2025-02-03 RX ORDER — ESTRADIOL 0.1 MG/G
0.1 CREAM VAGINAL
Qty: 1 | Refills: 5 | Status: ACTIVE | COMMUNITY
Start: 2025-02-03 | End: 1900-01-01

## 2025-02-14 ENCOUNTER — LABORATORY RESULT (OUTPATIENT)
Age: 80
End: 2025-02-14

## 2025-03-11 ENCOUNTER — APPOINTMENT (OUTPATIENT)
Dept: RHEUMATOLOGY | Facility: CLINIC | Age: 80
End: 2025-03-11
Payer: MEDICARE

## 2025-03-11 PROCEDURE — 99213 OFFICE O/P EST LOW 20 MIN: CPT | Mod: 93

## 2025-03-12 ENCOUNTER — LABORATORY RESULT (OUTPATIENT)
Age: 80
End: 2025-03-12

## 2025-03-13 ENCOUNTER — LABORATORY RESULT (OUTPATIENT)
Age: 80
End: 2025-03-13

## 2025-03-20 ENCOUNTER — APPOINTMENT (OUTPATIENT)
Dept: NEUROLOGY | Facility: CLINIC | Age: 80
End: 2025-03-20
Payer: MEDICARE

## 2025-03-20 DIAGNOSIS — F32.A ANXIETY DISORDER, UNSPECIFIED: ICD-10-CM

## 2025-03-20 DIAGNOSIS — F41.9 ANXIETY DISORDER, UNSPECIFIED: ICD-10-CM

## 2025-03-20 DIAGNOSIS — I10 ESSENTIAL (PRIMARY) HYPERTENSION: ICD-10-CM

## 2025-03-20 DIAGNOSIS — R53.81 OTHER MALAISE: ICD-10-CM

## 2025-03-20 PROCEDURE — 99214 OFFICE O/P EST MOD 30 MIN: CPT

## 2025-03-20 PROCEDURE — G2211 COMPLEX E/M VISIT ADD ON: CPT

## 2025-03-20 RX ORDER — METOPROLOL TARTRATE 25 MG/1
25 TABLET ORAL DAILY
Refills: 0 | Status: ACTIVE | COMMUNITY
Start: 2025-03-20

## 2025-03-20 RX ORDER — CITALOPRAM HYDROBROMIDE 20 MG/1
20 TABLET, FILM COATED ORAL
Refills: 0 | Status: ACTIVE | COMMUNITY
Start: 2025-03-20

## 2025-05-05 ENCOUNTER — APPOINTMENT (OUTPATIENT)
Dept: UROLOGY | Facility: CLINIC | Age: 80
End: 2025-05-05
Payer: MEDICARE

## 2025-05-05 VITALS
HEIGHT: 62 IN | BODY MASS INDEX: 25.76 KG/M2 | HEART RATE: 62 BPM | TEMPERATURE: 98.9 F | WEIGHT: 140 LBS | OXYGEN SATURATION: 99 % | SYSTOLIC BLOOD PRESSURE: 177 MMHG | DIASTOLIC BLOOD PRESSURE: 84 MMHG | RESPIRATION RATE: 16 BRPM

## 2025-05-05 DIAGNOSIS — N39.0 URINARY TRACT INFECTION, SITE NOT SPECIFIED: ICD-10-CM

## 2025-05-05 PROCEDURE — 99213 OFFICE O/P EST LOW 20 MIN: CPT
